# Patient Record
Sex: FEMALE
[De-identification: names, ages, dates, MRNs, and addresses within clinical notes are randomized per-mention and may not be internally consistent; named-entity substitution may affect disease eponyms.]

---

## 2019-04-12 ENCOUNTER — TRANSCRIPTION ENCOUNTER (OUTPATIENT)
Age: 74
End: 2019-04-12

## 2019-08-02 ENCOUNTER — TRANSCRIPTION ENCOUNTER (OUTPATIENT)
Age: 74
End: 2019-08-02

## 2019-11-22 ENCOUNTER — TRANSCRIPTION ENCOUNTER (OUTPATIENT)
Age: 74
End: 2019-11-22

## 2021-04-15 ENCOUNTER — NON-APPOINTMENT (OUTPATIENT)
Age: 76
End: 2021-04-15

## 2021-04-15 ENCOUNTER — APPOINTMENT (OUTPATIENT)
Dept: OPHTHALMOLOGY | Facility: CLINIC | Age: 76
End: 2021-04-15
Payer: MEDICARE

## 2021-04-15 PROBLEM — Z00.00 ENCOUNTER FOR PREVENTIVE HEALTH EXAMINATION: Status: ACTIVE | Noted: 2021-04-15

## 2021-04-15 PROCEDURE — 99072 ADDL SUPL MATRL&STAF TM PHE: CPT

## 2021-04-15 PROCEDURE — 92020 GONIOSCOPY: CPT

## 2021-04-15 PROCEDURE — 92250 FUNDUS PHOTOGRAPHY W/I&R: CPT

## 2021-04-15 PROCEDURE — 92004 COMPRE OPH EXAM NEW PT 1/>: CPT

## 2021-11-05 ENCOUNTER — APPOINTMENT (OUTPATIENT)
Dept: OPHTHALMOLOGY | Facility: CLINIC | Age: 76
End: 2021-11-05

## 2023-01-20 ENCOUNTER — OFFICE VISIT (OUTPATIENT)
Dept: FAMILY MEDICINE CLINIC | Facility: CLINIC | Age: 78
End: 2023-01-20

## 2023-01-20 VITALS
TEMPERATURE: 97.5 F | OXYGEN SATURATION: 98 % | WEIGHT: 144 LBS | SYSTOLIC BLOOD PRESSURE: 150 MMHG | BODY MASS INDEX: 24.59 KG/M2 | RESPIRATION RATE: 16 BRPM | HEART RATE: 54 BPM | DIASTOLIC BLOOD PRESSURE: 60 MMHG | HEIGHT: 64 IN

## 2023-01-20 DIAGNOSIS — E03.9 ACQUIRED HYPOTHYROIDISM: ICD-10-CM

## 2023-01-20 DIAGNOSIS — E11.9 TYPE 2 DIABETES MELLITUS WITHOUT COMPLICATION, WITHOUT LONG-TERM CURRENT USE OF INSULIN (HCC): Primary | ICD-10-CM

## 2023-01-20 DIAGNOSIS — I10 ESSENTIAL HYPERTENSION: ICD-10-CM

## 2023-01-20 DIAGNOSIS — F41.1 GENERALIZED ANXIETY DISORDER: ICD-10-CM

## 2023-01-20 DIAGNOSIS — I89.0 LYMPHEDEMA OF LEFT ARM: ICD-10-CM

## 2023-01-20 DIAGNOSIS — Z23 NEED FOR VACCINATION: ICD-10-CM

## 2023-01-20 DIAGNOSIS — Z11.59 NEED FOR HEPATITIS C SCREENING TEST: ICD-10-CM

## 2023-01-20 RX ORDER — PAROXETINE 10 MG/1
10 TABLET, FILM COATED ORAL DAILY
Qty: 90 TABLET | Refills: 1 | Status: SHIPPED | OUTPATIENT
Start: 2023-01-20

## 2023-01-20 RX ORDER — BENAZEPRIL HYDROCHLORIDE 40 MG/1
40 TABLET, FILM COATED ORAL DAILY
COMMUNITY
End: 2023-01-20 | Stop reason: SDUPTHER

## 2023-01-20 RX ORDER — PAROXETINE 10 MG/1
10 TABLET, FILM COATED ORAL DAILY
COMMUNITY
End: 2023-01-20 | Stop reason: SDUPTHER

## 2023-01-20 RX ORDER — AMLODIPINE BESYLATE 10 MG/1
10 TABLET ORAL DAILY
COMMUNITY
End: 2023-01-20 | Stop reason: SDUPTHER

## 2023-01-20 RX ORDER — LEVOTHYROXINE SODIUM 0.05 MG/1
50 TABLET ORAL DAILY
COMMUNITY
End: 2023-01-20 | Stop reason: SDUPTHER

## 2023-01-20 RX ORDER — AMLODIPINE BESYLATE 10 MG/1
10 TABLET ORAL DAILY
Qty: 90 TABLET | Refills: 1 | Status: SHIPPED | OUTPATIENT
Start: 2023-01-20

## 2023-01-20 RX ORDER — LEVOTHYROXINE SODIUM 0.05 MG/1
50 TABLET ORAL DAILY
Qty: 90 TABLET | Refills: 1 | Status: SHIPPED | OUTPATIENT
Start: 2023-01-20

## 2023-01-20 RX ORDER — BENAZEPRIL HYDROCHLORIDE 40 MG/1
40 TABLET, FILM COATED ORAL DAILY
Qty: 90 TABLET | Refills: 1 | Status: SHIPPED | OUTPATIENT
Start: 2023-01-20

## 2023-01-20 NOTE — ASSESSMENT & PLAN NOTE
Patient ports that her sugars have been controlled  We will have her get labs and follow-up in 1 month  Continue metformin

## 2023-01-20 NOTE — PROGRESS NOTES
Name: Yovany Castro      : 1945      MRN: [id number]  Encounter Provider: Rony Bentley DO  Encounter Date: 2023   [facility]    Assessment & Plan     1  Type 2 diabetes mellitus without complication, without long-term current use of insulin (HCC)  Assessment & Plan:    Patient ports that her sugars have been controlled  We will have her get labs and follow-up in 1 month  Continue metformin    Orders:  -     metFORMIN (GLUCOPHAGE) 1000 MG tablet; Take 1 tablet (1,000 mg total) by mouth 2 (two) times a day with meals  -     Hemoglobin A1C; Future; Expected date: 2023  -     Microalbumin / creatinine urine ratio; Future; Expected date: 2023    2  Lymphedema of left arm  Assessment & Plan:  Has history of skin infections   Has compression sleeve at home      Orders:  -     Ambulatory referral to Physical Therapy; Future    3  Essential hypertension  Assessment & Plan:  Blood pressure is elevated today but patient is anxious about her first appointment here  We will have her continue her amlodipine 10 mg daily and benazepril 40 mg daily and follow-up in the office for 1 month recheck    Orders:  -     benazepril (LOTENSIN) 40 MG tablet; Take 1 tablet (40 mg total) by mouth daily  -     amLODIPine (NORVASC) 10 mg tablet; Take 1 tablet (10 mg total) by mouth daily  -     Comprehensive metabolic panel; Future; Expected date: 2023  -     CBC and Platelet; Future; Expected date: 2023  -     Lipid Panel with Direct LDL reflex; Future; Expected date: 2023    4  Acquired hypothyroidism  Assessment & Plan:  Stable  Will continue levothyroxine 50 mcg daily    Orders:  -     levothyroxine 50 mcg tablet; Take 1 tablet (50 mcg total) by mouth daily  -     TSH, 3rd generation; Future  -     T4, free; Future    5   Generalized anxiety disorder  Assessment & Plan:  Stable  Continue paroxetine 10 mg daily    Orders:  -     PARoxetine (PAXIL) 10 mg tablet; Take 1 tablet (10 mg total) by mouth daily    6  Need for hepatitis C screening test  -     Hepatitis C Antibody ([facility]); Future    7  Need for vaccination  -     influenza vaccine, high-dose, PF 0 7 mL (FLUZONE HIGH-DOSE)        Depression Screening and Follow-up Plan: Patient was screened for depression during today's encounter  They screened negative with a PHQ-2 score of 0  Return in about 1 month (around 2/20/2023) for Diabetic follow up  Subjective      She had diabetes for 10 years  She is taking her medication regularly  She has had under active thyroid for years  She started paxil recently and is tolerating it well  It is helping her anxiety  She had had lymphedema in her left arm since her mastectomy  She does get periodic infections  Review of Systems    Current Outpatient Medications on File Prior to Visit   Medication Sig   • [DISCONTINUED] amLODIPine (NORVASC) 10 mg tablet Take 10 mg by mouth daily   • [DISCONTINUED] benazepril (LOTENSIN) 40 MG tablet Take 40 mg by mouth daily   • [DISCONTINUED] levothyroxine 50 mcg tablet Take 50 mcg by mouth daily   • [DISCONTINUED] metFORMIN (GLUCOPHAGE) 1000 MG tablet Take 1,000 mg by mouth 2 (two) times a day with meals   • [DISCONTINUED] PARoxetine (PAXIL) 10 mg tablet Take 10 mg by mouth daily       Objective     /60 (BP Location: Right arm, Patient Position: Sitting, Cuff Size: Standard)   Pulse (!) 54   Temp 97 5 °F (36 4 °C) (Temporal)   Resp 16   Ht 5' 4" (1 626 m)   Wt 65 3 kg (144 lb)   SpO2 98%   BMI 24 72 kg/m²     Physical Exam  Vitals and nursing note reviewed  Constitutional:       Appearance: She is well-developed  HENT:      Head: Normocephalic and atraumatic  Right Ear: Tympanic membrane and external ear normal       Left Ear: Tympanic membrane and external ear normal    Cardiovascular:      Rate and Rhythm: Normal rate and regular rhythm  Heart sounds: Normal heart sounds   No murmur heard     No friction rub  Pulmonary:      Effort: Pulmonary effort is normal  No respiratory distress  Breath sounds: Normal breath sounds  No wheezing or rales  Musculoskeletal:      Right lower leg: No edema  Left lower leg: No edema         Montgomery Southward, DO

## 2023-01-20 NOTE — ASSESSMENT & PLAN NOTE
Blood pressure is elevated today but patient is anxious about her first appointment here  We will have her continue her amlodipine 10 mg daily and benazepril 40 mg daily and follow-up in the office for 1 month recheck

## 2023-01-26 ENCOUNTER — OFFICE VISIT (OUTPATIENT)
Dept: URGENT CARE | Facility: CLINIC | Age: 78
End: 2023-01-26

## 2023-01-26 VITALS
SYSTOLIC BLOOD PRESSURE: 135 MMHG | WEIGHT: 145 LBS | HEIGHT: 64 IN | DIASTOLIC BLOOD PRESSURE: 71 MMHG | HEART RATE: 45 BPM | TEMPERATURE: 98.2 F | RESPIRATION RATE: 18 BRPM | OXYGEN SATURATION: 100 % | BODY MASS INDEX: 24.75 KG/M2

## 2023-01-26 DIAGNOSIS — R22.0 FACIAL SWELLING: Primary | ICD-10-CM

## 2023-01-26 NOTE — PROGRESS NOTES
[address] [facility]        NAME: Kandis Robledo is a 69 y o  female  : 1945    MRN: [id number]  DATE: 2023  TIME: 12:46 PM    Assessment and Plan   Facial swelling [R22 0]  1  Facial swelling          Unclear etiology but ddx may include allergic reaction, dental infection, sinusitis >>> acute neurologic event  Patient's neuro exam is completely normal, and she states these exact same symptoms have previously self-resolved  Encouraged pt to see pcp in next few days if no improvement  Discussed strict return to care precautions as well as red flag symptoms which should prompt immediate ED referral  Pt verbalized understanding and is in agreement with plan  Please follow up with your primary care provider within the next week  Please remember that your visit today was with an urgent care provider and should not replace follow up with your primary care provider for chronic medical issues or annual physicals  Patient Instructions       Follow up with PCP in 3-5 days  Proceed to  ER if symptoms worsen  Chief Complaint     Chief Complaint   Patient presents with   • Facial Swelling     Rt facial swelling began this am          History of Present Illness       Pt is a 68 yo female with pmh DM2, HTN pw R sided facial swelling x 12 hours  Turned over in her sleep around 1230 am and noticed the right side of her face felt heavy, so she got up to use the bathroom and noticed the swelling  Applied ice and it went down  Notes she has a bad tooth on that side that needs to be pulled but it is not causing her any pain  Had similar problem 2 months ago on the left side of her face which resolved spontaneously  Is s/p b/l mastectomy in  for ca  States she always has a low heart rate in the mid 40s and last EKG was 2022  Has follow up appointment with pcp on   Review of Systems   Review of Systems   Constitutional: Negative for chills, diaphoresis, fatigue and fever     HENT: Positive for facial swelling  Negative for congestion, ear pain, postnasal drip, rhinorrhea, sinus pain, sneezing, sore throat and trouble swallowing  Eyes: Negative for pain and redness  Respiratory: Negative for cough, chest tightness, shortness of breath and wheezing  Cardiovascular: Negative for chest pain and leg swelling  Gastrointestinal: Negative for diarrhea, nausea and vomiting  Musculoskeletal: Negative for myalgias  Skin: Negative for rash  Neurological: Negative for dizziness, speech difficulty, weakness and headaches  Current Medications       Current Outpatient Medications:   •  amLODIPine (NORVASC) 10 mg tablet, Take 1 tablet (10 mg total) by mouth daily, Disp: 90 tablet, Rfl: 1  •  benazepril (LOTENSIN) 40 MG tablet, Take 1 tablet (40 mg total) by mouth daily, Disp: 90 tablet, Rfl: 1  •  levothyroxine 50 mcg tablet, Take 1 tablet (50 mcg total) by mouth daily, Disp: 90 tablet, Rfl: 1  •  metFORMIN (GLUCOPHAGE) 1000 MG tablet, Take 1 tablet (1,000 mg total) by mouth 2 (two) times a day with meals, Disp: 90 tablet, Rfl: 1  •  PARoxetine (PAXIL) 10 mg tablet, Take 1 tablet (10 mg total) by mouth daily, Disp: 90 tablet, Rfl: 1    Current Allergies     Allergies as of 01/26/2023 - Reviewed 01/26/2023   Allergen Reaction Noted   • Other Vomiting and Fever 01/20/2023            The following portions of the patient's history were reviewed and updated as appropriate: allergies, current medications, past family history, past medical history, past social history, past surgical history and problem list      Past Medical History:   Diagnosis Date   • Breast cancer ([facility] [address] )        Past Surgical History:   Procedure Laterality Date   • HYSTERECTOMY     • MASTECTOMY MODIFIED RADICAL Bilateral    • SPINE SURGERY         Family History   Problem Relation Age of Onset   • Hypertension Father    • Hypertension Sister    • Hypertension Brother          Medications have been verified          Objective   BP 135/71   Pulse (!) 45   Temp 98 2 °F (36 8 °C)   Resp 18   Ht 5' 4" (1 626 m)   Wt 65 8 kg (145 lb)   SpO2 100%   BMI 24 89 kg/m²        Physical Exam     Physical Exam  Vitals and nursing note reviewed  Constitutional:       General: She is not in acute distress  Appearance: Normal appearance  She is not ill-appearing  HENT:      Head: Normocephalic and atraumatic  Mouth/Throat:      Mouth: Mucous membranes are moist       Dentition: Dental caries present  No dental tenderness or gum lesions  Tongue: Tongue does not deviate from midline  Pharynx: Oropharynx is clear  Uvula midline  Cardiovascular:      Rate and Rhythm: Regular rhythm  Bradycardia present  Heart sounds: Normal heart sounds  Pulmonary:      Effort: Pulmonary effort is normal  No respiratory distress  Breath sounds: Normal breath sounds  Skin:     General: Skin is warm and dry  Capillary Refill: Capillary refill takes less than 2 seconds  Neurological:      General: No focal deficit present  Mental Status: She is alert and oriented to person, place, and time  Cranial Nerves: No cranial nerve deficit  Sensory: No sensory deficit  Motor: No weakness        Coordination: Coordination normal       Gait: Gait normal    Psychiatric:         Behavior: Behavior normal

## 2023-01-27 ENCOUNTER — APPOINTMENT (OUTPATIENT)
Dept: LAB | Facility: CLINIC | Age: 78
End: 2023-01-27

## 2023-01-27 DIAGNOSIS — E03.9 ACQUIRED HYPOTHYROIDISM: ICD-10-CM

## 2023-01-27 DIAGNOSIS — Z11.59 NEED FOR HEPATITIS C SCREENING TEST: ICD-10-CM

## 2023-01-27 DIAGNOSIS — E11.9 TYPE 2 DIABETES MELLITUS WITHOUT COMPLICATION, WITHOUT LONG-TERM CURRENT USE OF INSULIN (HCC): ICD-10-CM

## 2023-01-27 DIAGNOSIS — I10 ESSENTIAL HYPERTENSION: ICD-10-CM

## 2023-01-27 LAB
ALBUMIN SERPL BCP-MCNC: 3.5 G/DL (ref 3.5–5)
ALP SERPL-CCNC: 85 U/L (ref 46–116)
ALT SERPL W P-5'-P-CCNC: 30 U/L (ref 12–78)
ANION GAP SERPL CALCULATED.3IONS-SCNC: 4 MMOL/L (ref 4–13)
AST SERPL W P-5'-P-CCNC: 21 U/L (ref 5–45)
BILIRUB SERPL-MCNC: 0.63 MG/DL (ref 0.2–1)
BUN SERPL-MCNC: 18 MG/DL (ref 5–25)
CALCIUM SERPL-MCNC: 10.2 MG/DL (ref 8.3–10.1)
CHLORIDE SERPL-SCNC: 107 MMOL/L (ref 96–108)
CHOLEST SERPL-MCNC: 167 MG/DL
CO2 SERPL-SCNC: 26 MMOL/L (ref 21–32)
CREAT SERPL-MCNC: 0.81 MG/DL (ref 0.6–1.3)
CREAT UR-MCNC: 99.7 MG/DL
ERYTHROCYTE [DISTWIDTH] IN BLOOD BY AUTOMATED COUNT: 14.2 % (ref 11.6–15.1)
GFR SERPL CREATININE-BSD FRML MDRD: 70 ML/MIN/1.73SQ M
GLUCOSE P FAST SERPL-MCNC: 105 MG/DL (ref 65–99)
HCT VFR BLD AUTO: 43.2 % (ref 34.8–46.1)
HCV AB SER QL: NORMAL
HDLC SERPL-MCNC: 84 MG/DL
HGB BLD-MCNC: 13.7 G/DL (ref 11.5–15.4)
LDLC SERPL CALC-MCNC: 74 MG/DL (ref 0–100)
MCH RBC QN AUTO: 26.6 PG (ref 26.8–34.3)
MCHC RBC AUTO-ENTMCNC: 31.7 G/DL (ref 31.4–37.4)
MCV RBC AUTO: 84 FL (ref 82–98)
MICROALBUMIN UR-MCNC: 19.9 MG/L (ref 0–20)
MICROALBUMIN/CREAT 24H UR: 20 MG/G CREATININE (ref 0–30)
PLATELET # BLD AUTO: 242 THOUSANDS/UL (ref 149–390)
PMV BLD AUTO: 9.7 FL (ref 8.9–12.7)
POTASSIUM SERPL-SCNC: 3.7 MMOL/L (ref 3.5–5.3)
PROT SERPL-MCNC: 7.3 G/DL (ref 6.4–8.4)
RBC # BLD AUTO: 5.16 MILLION/UL (ref 3.81–5.12)
SODIUM SERPL-SCNC: 137 MMOL/L (ref 135–147)
T4 FREE SERPL-MCNC: 1.22 NG/DL (ref 0.76–1.46)
TRIGL SERPL-MCNC: 45 MG/DL
TSH SERPL DL<=0.05 MIU/L-ACNC: 0.22 UIU/ML (ref 0.45–4.5)
WBC # BLD AUTO: 6.69 THOUSAND/UL (ref 4.31–10.16)

## 2023-01-28 LAB
EST. AVERAGE GLUCOSE BLD GHB EST-MCNC: 131 MG/DL
HBA1C MFR BLD: 6.2 %

## 2023-02-11 ENCOUNTER — APPOINTMENT (EMERGENCY)
Dept: RADIOLOGY | Facility: HOSPITAL | Age: 78
End: 2023-02-11

## 2023-02-11 ENCOUNTER — HOSPITAL ENCOUNTER (EMERGENCY)
Facility: HOSPITAL | Age: 78
Discharge: HOME | End: 2023-02-11
Attending: EMERGENCY MEDICINE

## 2023-02-11 VITALS
RESPIRATION RATE: 18 BRPM | TEMPERATURE: 98.1 F | HEART RATE: 51 BPM | SYSTOLIC BLOOD PRESSURE: 176 MMHG | OXYGEN SATURATION: 100 % | DIASTOLIC BLOOD PRESSURE: 77 MMHG

## 2023-02-11 DIAGNOSIS — S20.212A RIB CONTUSION, LEFT, INITIAL ENCOUNTER: ICD-10-CM

## 2023-02-11 DIAGNOSIS — S06.0XAA CONCUSSION: Primary | ICD-10-CM

## 2023-02-11 RX ORDER — LIDOCAINE 50 MG/G
1 PATCH TOPICAL ONCE
Status: DISCONTINUED | OUTPATIENT
Start: 2023-02-11 | End: 2023-02-11 | Stop reason: HOSPADM

## 2023-02-11 RX ORDER — ACETAMINOPHEN 325 MG/1
975 TABLET ORAL ONCE
Status: COMPLETED | OUTPATIENT
Start: 2023-02-11 | End: 2023-02-11

## 2023-02-11 RX ORDER — ONDANSETRON 4 MG/1
4 TABLET, ORALLY DISINTEGRATING ORAL ONCE
Status: COMPLETED | OUTPATIENT
Start: 2023-02-11 | End: 2023-02-11

## 2023-02-11 RX ADMIN — LIDOCAINE 5% 1 PATCH: 700 PATCH TOPICAL at 16:24

## 2023-02-11 RX ADMIN — ACETAMINOPHEN 975 MG: 325 TABLET, FILM COATED ORAL at 14:22

## 2023-02-11 RX ADMIN — ONDANSETRON 4 MG: 4 TABLET, ORALLY DISINTEGRATING ORAL at 14:22

## 2023-02-12 NOTE — ED PROVIDER NOTES
History  Chief Complaint   Patient presents with   • Fall     Pt reports falling down the bottom two steps of a flight of stairs, striking her head off the railing, and catching the inside of L arm on railing  Pt reports armpit pain in L arm, and increasing nausea after head injury  Pt History of L arm lymphedema     HPI    Prior to Admission Medications   Prescriptions Last Dose Informant Patient Reported? Taking? PARoxetine (PAXIL) 10 mg tablet   No No   Sig: Take 1 tablet (10 mg total) by mouth daily   amLODIPine (NORVASC) 10 mg tablet   No No   Sig: Take 1 tablet (10 mg total) by mouth daily   benazepril (LOTENSIN) 40 MG tablet   No No   Sig: Take 1 tablet (40 mg total) by mouth daily   levothyroxine 50 mcg tablet   No No   Sig: Take 1 tablet (50 mcg total) by mouth daily   metFORMIN (GLUCOPHAGE) 1000 MG tablet   No No   Sig: Take 1 tablet (1,000 mg total) by mouth 2 (two) times a day with meals      Facility-Administered Medications: None       Past Medical History:   Diagnosis Date   • [facility]        Past Surgical History:   Procedure Laterality Date   • HYSTERECTOMY     • MASTECTOMY MODIFIED RADICAL Bilateral    • SPINE SURGERY         Family History   Problem Relation Age of Onset   • Hypertension Father    • Hypertension Sister    • Hypertension Brother      I have reviewed and agree with the history as documented  E-Cigarette/Vaping     E-Cigarette/Vaping Substances     Social History     Tobacco Use   • Smoking status: Never     Passive exposure: Never   • Smokeless tobacco: Never   Substance Use Topics   • Alcohol use:  Yes     Alcohol/week: 1 0 standard drink     Types: 1 Glasses of wine per week   • Drug use: Never       Review of Systems    Physical Exam  Physical Exam    Vital Signs  ED Triage Vitals [02/11/23 1332]   Temperature Pulse Respirations Blood Pressure SpO2   98 1 °F (36 7 °C) (!) 51 18 (!) 176/77 100 %      Temp Source Heart Rate Source Patient Position - Orthostatic VS BP Location FiO2 (%)   Tympanic Monitor Sitting Right arm --      Pain Score       9           Vitals:    02/11/23 1332   BP: (!) 176/77   Pulse: (!) 51   Patient Position - Orthostatic VS: Sitting         Visual Acuity      ED Medications  Medications   acetaminophen (TYLENOL) tablet 975 mg (975 mg Oral Given 2/11/23 1422)   ondansetron (ZOFRAN-ODT) dispersible tablet 4 mg (4 mg Oral Given 2/11/23 1422)       Diagnostic Studies  Results Reviewed     None                 CT head without contrast   Final Result by Bee Moyer MD (02/11 1537)      No acute intracranial abnormality  Chronic microangiopathic changes  Workstation performed: [id number]         XR ribs with pa chest min 3 views LEFT   Final Result by Courtney Long MD (02/11 1753)      1  No evidence of rib fracture  2   Large superior mediastinal mass  Differential includes lymphoma, metastasis, thyroid goiter, and thymoma  Chest CT recommended for further evaluation  Left mid lung nodule  This can also be evaluated on chest CT  I personally discussed this study with Moe Hewitt on 2/11/2023 at 5:47 PM                Workstation performed: [id number]                    Procedures  Procedures         ED Course                               SBIRT 20yo+    Flowsheet Row Most Recent Value   SBIRT (25 yo +)    In order to provide better care to our patients, we are screening all of our patients for alcohol and drug use  Would it be okay to ask you these screening questions? Yes Filed at: 02/11/2023 1556   Initial Alcohol Screen: US AUDIT-C     1  How often do you have a drink containing alcohol? 0 Filed at: 02/11/2023 1556   2  How many drinks containing alcohol do you have on a typical day you are drinking? 0 Filed at: 02/11/2023 1556   3b  FEMALE Any Age, or MALE 65+: How often do you have 4 or more drinks on one occassion?  0 Filed at: 02/11/2023 1556   Audit-C Score 0 Filed at: 02/11/2023 1556   BECKA: How many times in the past year have you    Used an illegal drug or used a prescription medication for non-medical reasons? Never Filed at: 02/11/2023 1556                    MDM    Disposition  Final diagnoses:   Concussion   Rib contusion, left, initial encounter     Time reflects when diagnosis was documented in both MDM as applicable and the Disposition within this note     Time User Action Codes Description Comment    2/11/2023  4:16 PM Jazmin Bailey [S06  0XAA] Concussion     2/11/2023  4:17 PM Jazmin Bailey [S20 212A] Rib contusion, left, initial encounter       ED Disposition     ED Disposition   Discharge    Condition   Stable    Date/Time   Sat Feb 11, 2023 [address] [facility] discharge to home/self care  Follow-up Information     Follow up With Specialties Details Why [address], DO Family Medicine  As needed, If symptoms worsen [address] [address]  [phone]            Discharge Medication List as of 2/11/2023  4:20 PM      CONTINUE these medications which have NOT CHANGED    Details   amLODIPine (NORVASC) 10 mg tablet Take 1 tablet (10 mg total) by mouth daily, Starting Fri 1/20/2023, Normal      benazepril (LOTENSIN) 40 MG tablet Take 1 tablet (40 mg total) by mouth daily, Starting Fri 1/20/2023, Normal      levothyroxine 50 mcg tablet Take 1 tablet (50 mcg total) by mouth daily, Starting Fri 1/20/2023, Normal      metFORMIN (GLUCOPHAGE) 1000 MG tablet Take 1 tablet (1,000 mg total) by mouth 2 (two) times a day with meals, Starting Fri 1/20/2023, Normal      PARoxetine (PAXIL) 10 mg tablet Take 1 tablet (10 mg total) by mouth daily, Starting Fri 1/20/2023, Normal             No discharge procedures on file      PDMP Review     None          ED Provider  Electronically Signed by Time reflects when diagnosis was documented in both MDM as applicable and the Disposition within this note     Time User Action Codes Description Comment    2/11/2023  4:16 PM Leta Resides Add [S06  0XAA] Concussion     2/11/2023  4:17 PM Leta Resides Add [S20 212A] Rib contusion, left, initial encounter       ED Disposition     ED Disposition   Discharge    Condition   Stable    Date/Time   Sat Feb 11, 2023 [address] [facility] discharge to home/self care  Follow-up Information     Follow up With Specialties Details Why [address], DO Family Medicine  As needed, If symptoms worsen [address] [address]  [phone]            Discharge Medication List as of 2/11/2023  4:20 PM      CONTINUE these medications which have NOT CHANGED    Details   amLODIPine (NORVASC) 10 mg tablet Take 1 tablet (10 mg total) by mouth daily, Starting Fri 1/20/2023, Normal      benazepril (LOTENSIN) 40 MG tablet Take 1 tablet (40 mg total) by mouth daily, Starting Fri 1/20/2023, Normal      levothyroxine 50 mcg tablet Take 1 tablet (50 mcg total) by mouth daily, Starting Fri 1/20/2023, Normal      metFORMIN (GLUCOPHAGE) 1000 MG tablet Take 1 tablet (1,000 mg total) by mouth 2 (two) times a day with meals, Starting Fri 1/20/2023, Normal      PARoxetine (PAXIL) 10 mg tablet Take 1 tablet (10 mg total) by mouth daily, Starting Fri 1/20/2023, Normal             No discharge procedures on file      PDMP Review     None          ED Provider  Electronically Signed by           Ivania Hernández DO  02/27/23 6999

## 2023-02-20 ENCOUNTER — OFFICE VISIT (OUTPATIENT)
Dept: FAMILY MEDICINE CLINIC | Facility: CLINIC | Age: 78
End: 2023-02-20

## 2023-02-20 VITALS
OXYGEN SATURATION: 98 % | DIASTOLIC BLOOD PRESSURE: 60 MMHG | HEIGHT: 64 IN | SYSTOLIC BLOOD PRESSURE: 124 MMHG | TEMPERATURE: 98.4 F | WEIGHT: 143.4 LBS | RESPIRATION RATE: 18 BRPM | BODY MASS INDEX: 24.48 KG/M2 | HEART RATE: 58 BPM

## 2023-02-20 DIAGNOSIS — E03.9 ACQUIRED HYPOTHYROIDISM: ICD-10-CM

## 2023-02-20 DIAGNOSIS — E11.9 TYPE 2 DIABETES MELLITUS WITHOUT COMPLICATION, WITHOUT LONG-TERM CURRENT USE OF INSULIN (HCC): ICD-10-CM

## 2023-02-20 DIAGNOSIS — E04.9 ENLARGED THYROID GLAND: ICD-10-CM

## 2023-02-20 DIAGNOSIS — F33.9 RECURRENT DEPRESSION (HCC): ICD-10-CM

## 2023-02-20 DIAGNOSIS — I10 ESSENTIAL HYPERTENSION: ICD-10-CM

## 2023-02-20 DIAGNOSIS — J98.59 MEDIASTINAL MASS: Primary | ICD-10-CM

## 2023-02-20 DIAGNOSIS — E83.52 HYPERCALCEMIA: ICD-10-CM

## 2023-02-20 PROBLEM — Z85.3 HISTORY OF BREAST CANCER: Status: ACTIVE | Noted: 2023-02-20

## 2023-02-20 RX ORDER — LEVOTHYROXINE SODIUM 0.03 MG/1
25 TABLET ORAL DAILY
Qty: 90 TABLET | Refills: 1 | Status: SHIPPED | OUTPATIENT
Start: 2023-02-20

## 2023-02-20 NOTE — PROGRESS NOTES
Name: Holly Mojica      : 1945      MRN: [id number]  Encounter Provider: Veronica Reeves DO  Encounter Date: 2023   Encounter department: [facility]    Assessment & Plan     1  Mediastinal mass  Comments:  New mediastinal mass, patient with history of breast cancer  I am concerned about recurrent breast cancer  Patient and her daughter are agreeable to get CT of the chest for further work-up  Once I get the results I will call her to discuss next steps  May need referral to surgery or oncology depending on the results  Orders:  -     CT chest w contrast; Future; Expected date: 2023  -     CBC; Future; Expected date: 2023    2  Hypercalcemia  Comments:  New, will stop calcium vitamin and recheck labs  Concerned about enlarged area in her neck as well  Could be her parathyroid? We will check ultrasound  Orders:  -     Basic metabolic panel; Future  -     Vitamin D 25 hydroxy; Future  -     PTH, intact; Future  -     Phosphorus; Future    3  Acquired hypothyroidism  Assessment & Plan:  TSH is too low  Dose of levothyroxine changed from 50 mcg daily to 25 mcg daily  Patient also noted to have enlarged thyroid gland on exam    Orders:  -     US thyroid; Future; Expected date: 2023  -     levothyroxine 25 mcg tablet; Take 1 tablet (25 mcg total) by mouth daily  -     T4, free; Future; Expected date: 2023  -     TSH, 3rd generation; Future; Expected date: 2023    4  Type 2 diabetes mellitus without complication, without long-term current use of insulin (HCC)  Assessment & Plan:    Lab Results   Component Value Date    HGBA1C 6 2 (H) 2023     Diabetes is well controlled  Continue metformin 1000 mg twice daily    Orders:  -     Comprehensive metabolic panel; Future; Expected date: 2023  -     Hemoglobin A1C; Future; Expected date: 2023  -     Lipid Panel with Direct LDL reflex; Future; Expected date: 2023    5   Enlarged thyroid gland  Comments:  New, ultrasound ordered  Orders:  -     US thyroid; Future; Expected date: 02/20/2023    6  Recurrent depression ([address] )  Assessment & Plan:  Worsening  Patient to continue paroxetine and referred to behavioral health for therapy    Orders:  -     Ambulatory Referral to [facility]; Future    7  Essential hypertension  Assessment & Plan:  Blood pressure is well controlled  Continue benazepril 40 mg daily    Return in about 6 months (around 8/20/2023) for Diabetic follow up  Subjective      Patient here today for follow-up of her diabetes  She is been doing well on her medication  She also notes that she was recently in the emergency room after falling down her stairs at home  On the emergency room she did have an abnormal x-ray  Review of Systems    Current Outpatient Medications on File Prior to Visit   Medication Sig   • amLODIPine (NORVASC) 10 mg tablet Take 1 tablet (10 mg total) by mouth daily   • benazepril (LOTENSIN) 40 MG tablet Take 1 tablet (40 mg total) by mouth daily   • metFORMIN (GLUCOPHAGE) 1000 MG tablet Take 1 tablet (1,000 mg total) by mouth 2 (two) times a day with meals   • PARoxetine (PAXIL) 10 mg tablet Take 1 tablet (10 mg total) by mouth daily   • [DISCONTINUED] levothyroxine 50 mcg tablet Take 1 tablet (50 mcg total) by mouth daily       Objective     /60   Pulse 58   Temp 98 4 °F (36 9 °C)   Resp 18   Ht 5' 4" (1 626 m)   Wt 65 kg (143 lb 6 4 oz)   SpO2 98%   BMI 24 61 kg/m²     Physical Exam  Vitals and nursing note reviewed  Constitutional:       Appearance: She is well-developed  HENT:      Head: Normocephalic and atraumatic  Right Ear: Tympanic membrane and external ear normal       Left Ear: Tympanic membrane and external ear normal    Cardiovascular:      Rate and Rhythm: Normal rate and regular rhythm  Heart sounds: Normal heart sounds  No murmur heard  No friction rub     Pulmonary:      Effort: Pulmonary effort is normal  No respiratory distress  Breath sounds: Normal breath sounds  No wheezing or rales  Musculoskeletal:      Right lower leg: No edema  Left lower leg: No edema        Comments: Left upper extremity edema       Alice Ansari DO

## 2023-02-20 NOTE — ASSESSMENT & PLAN NOTE
TSH is too low  Dose of levothyroxine changed from 50 mcg daily to 25 mcg daily  Patient also noted to have enlarged thyroid gland on exam

## 2023-02-20 NOTE — ASSESSMENT & PLAN NOTE
Lab Results   Component Value Date    HGBA1C 6 2 (H) 01/27/2023     Diabetes is well controlled  Continue metformin 1000 mg twice daily

## 2023-02-20 NOTE — PATIENT INSTRUCTIONS
Recent Results (from the past 672 hour(s))   Hemoglobin A1C    Collection Time: 01/27/23 10:25 AM   Result Value Ref Range    Hemoglobin A1C 6 2 (H) Normal 3 8-5 6%; PreDiabetic 5 7-6 4%; Diabetic >=6 5%; Glycemic control for adults with diabetes <7 0% %     mg/dl   Comprehensive metabolic panel    Collection Time: 01/27/23 10:25 AM   Result Value Ref Range    Sodium 137 135 - 147 mmol/L    Potassium 3 7 3 5 - 5 3 mmol/L    Chloride 107 96 - 108 mmol/L    CO2 26 21 - 32 mmol/L    ANION GAP 4 4 - 13 mmol/L    BUN 18 5 - 25 mg/dL    Creatinine 0 81 0 60 - 1 30 mg/dL    Glucose, Fasting 105 (H) 65 - 99 mg/dL    Calcium 10 2 (H) 8 3 - 10 1 mg/dL    AST 21 5 - 45 U/L    ALT 30 12 - 78 U/L    Alkaline Phosphatase 85 46 - 116 U/L    Total Protein 7 3 6 4 - 8 4 g/dL    Albumin 3 5 3 5 - 5 0 g/dL    Total Bilirubin 0 63 0 20 - 1 00 mg/dL    eGFR 70 ml/min/1 73sq m   CBC and Platelet    Collection Time: 01/27/23 10:25 AM   Result Value Ref Range    WBC 6 69 4 31 - 10 16 Thousand/uL    RBC 5 16 (H) 3 81 - 5 12 Million/uL    Hemoglobin 13 7 11 5 - 15 4 g/dL    Hematocrit 43 2 34 8 - 46 1 %    MCV 84 82 - 98 fL    MCH 26 6 (L) 26 8 - 34 3 pg    MCHC 31 7 31 4 - 37 4 g/dL    RDW 14 2 11 6 - 15 1 %    Platelets 138 474 - 140 Thousands/uL    MPV 9 7 8 9 - 12 7 fL   Microalbumin / creatinine urine ratio    Collection Time: 01/27/23 10:25 AM   Result Value Ref Range    Creatinine, Ur 99 7 mg/dL    Microalbum  ,U,Random 19 9 0 0 - 20 0 mg/L    Microalb Creat Ratio 20 0 - 30 mg/g creatinine   Lipid Panel with Direct LDL reflex    Collection Time: 01/27/23 10:25 AM   Result Value Ref Range    Cholesterol 167 See Comment mg/dL    Triglycerides 45 See Comment mg/dL    HDL, Direct 84 >=50 mg/dL    LDL Calculated 74 0 - 100 mg/dL   TSH, 3rd generation    Collection Time: 01/27/23 10:25 AM   Result Value Ref Range    TSH 3RD GENERATON 0 220 (L) 0 450 - 4 500 uIU/mL   T4, free    Collection Time: 01/27/23 10:25 AM   Result Value Ref Range    Free T4 1 22 0 76 - 1 46 ng/dL   Hepatitis C Antibody ([facility])    Collection Time: 01/27/23 10:25 AM   Result Value Ref Range    Hepatitis C Ab Non-reactive Non-reactive    Study Result    Narrative & Impression   LEFT RIBS AND CHEST -DUAL ENERGY     INDICATION:   trauma  COMPARISON:  None     EXAM PERFORMED/VIEWS:  XR RIBS LEFT W PA CHEST MIN 3 VIEWS  The frontal view was performed utilizing dual energy radiographic technique  FINDINGS:     There is marked enlargement of the superior mediastinum consistent with mass  There is tracheal deviation to the right  There is a small nodular density in the left mid lung field measuring 1 3 cm  No pleural effusions  There is no pneumothorax  No rib fractures are identified  IMPRESSION:     1  No evidence of rib fracture  2   Large superior mediastinal mass  Differential includes lymphoma, metastasis, thyroid goiter, and thymoma  Chest CT recommended for further evaluation  Left mid lung nodule  This can also be evaluated on chest CT

## 2023-02-21 ENCOUNTER — TELEPHONE (OUTPATIENT)
Dept: PSYCHIATRY | Facility: CLINIC | Age: 78
End: 2023-02-21

## 2023-02-21 NOTE — TELEPHONE ENCOUNTER
Called patient regarding referral  Patient is interested in therapy  She is unsure if she wants a female or male provider

## 2023-03-01 ENCOUNTER — APPOINTMENT (OUTPATIENT)
Dept: LAB | Facility: CLINIC | Age: 78
End: 2023-03-01

## 2023-03-01 DIAGNOSIS — E83.52 HYPERCALCEMIA: ICD-10-CM

## 2023-03-01 LAB
25(OH)D3 SERPL-MCNC: 53.8 NG/ML (ref 30–100)
ANION GAP SERPL CALCULATED.3IONS-SCNC: 4 MMOL/L (ref 4–13)
BUN SERPL-MCNC: 17 MG/DL (ref 5–25)
CALCIUM SERPL-MCNC: 10.5 MG/DL (ref 8.3–10.1)
CHLORIDE SERPL-SCNC: 108 MMOL/L (ref 96–108)
CO2 SERPL-SCNC: 26 MMOL/L (ref 21–32)
CREAT SERPL-MCNC: 0.82 MG/DL (ref 0.6–1.3)
GFR SERPL CREATININE-BSD FRML MDRD: 69 ML/MIN/1.73SQ M
GLUCOSE P FAST SERPL-MCNC: 129 MG/DL (ref 65–99)
PHOSPHATE SERPL-MCNC: 3.2 MG/DL (ref 2.3–4.1)
POTASSIUM SERPL-SCNC: 4.5 MMOL/L (ref 3.5–5.3)
PTH-INTACT SERPL-MCNC: 98.6 PG/ML (ref 18.4–80.1)
SODIUM SERPL-SCNC: 138 MMOL/L (ref 135–147)

## 2023-03-02 ENCOUNTER — HOSPITAL ENCOUNTER (OUTPATIENT)
Dept: RADIOLOGY | Facility: HOSPITAL | Age: 78
Discharge: HOME | End: 2023-03-02

## 2023-03-02 ENCOUNTER — TELEPHONE (OUTPATIENT)
Dept: FAMILY MEDICINE CLINIC | Facility: CLINIC | Age: 78
End: 2023-03-02

## 2023-03-02 DIAGNOSIS — E04.9 ENLARGED THYROID GLAND: ICD-10-CM

## 2023-03-02 DIAGNOSIS — J98.59 MEDIASTINAL MASS: ICD-10-CM

## 2023-03-02 DIAGNOSIS — E03.9 ACQUIRED HYPOTHYROIDISM: ICD-10-CM

## 2023-03-02 DIAGNOSIS — E21.3 HYPERPARATHYROIDISM (HCC): Primary | ICD-10-CM

## 2023-03-02 RX ADMIN — IOHEXOL 85 ML: 350 INJECTION, SOLUTION INTRAVENOUS at 11:49

## 2023-03-02 NOTE — TELEPHONE ENCOUNTER
3/2/2023 8:58 AM Called Gene Webber regarding her lab results  We discussed that her calcium is still elevated and she has an elevated parathyroid level  I explained that that means her parathyroid gland is overactive and I referred her to endocrinology  She agreed to go    Order put in chart    Josep Oh, DO

## 2023-03-03 ENCOUNTER — TELEPHONE (OUTPATIENT)
Dept: ENDOCRINOLOGY | Facility: CLINIC | Age: 78
End: 2023-03-03

## 2023-03-09 ENCOUNTER — TELEPHONE (OUTPATIENT)
Dept: FAMILY MEDICINE CLINIC | Facility: CLINIC | Age: 78
End: 2023-03-09

## 2023-03-09 DIAGNOSIS — E07.9 THYROID MASS: Primary | ICD-10-CM

## 2023-03-09 NOTE — TELEPHONE ENCOUNTER
I spoke with [facility] and she will ask to have this read [facility]

## 2023-03-09 NOTE — TELEPHONE ENCOUNTER
Please call the radiology reading room  She had CT of the chest done on 3/2/23 and it is still not read yet  Thank you,  Hamzah Barajas, DO

## 2023-03-09 NOTE — TELEPHONE ENCOUNTER
3/9/2023 11:27 AM Called [facility] regarding her abnormal thyroid ultrasound and CT Chest    She agreed to see Dr Renetta Toor for follow-up of her massive enlargement of her thyroid gland that has extended into her mediastinum  I also reviewed the results with her daughter at her request   I reached out to ENT and they are going to set up an appointment with her as soon as possible      Jil Burkett, DO

## 2023-03-28 ENCOUNTER — HOSPITAL ENCOUNTER (EMERGENCY)
Facility: HOSPITAL | Age: 78
Discharge: HOME | End: 2023-03-28
Attending: EMERGENCY MEDICINE

## 2023-03-28 VITALS
SYSTOLIC BLOOD PRESSURE: 191 MMHG | OXYGEN SATURATION: 100 % | TEMPERATURE: 96.8 F | HEART RATE: 55 BPM | BODY MASS INDEX: 24.24 KG/M2 | RESPIRATION RATE: 17 BRPM | WEIGHT: 141.2 LBS | DIASTOLIC BLOOD PRESSURE: 76 MMHG

## 2023-03-28 DIAGNOSIS — T78.3XXA ANGIOEDEMA, INITIAL ENCOUNTER: Primary | ICD-10-CM

## 2023-03-28 RX ORDER — DIPHENHYDRAMINE HYDROCHLORIDE 50 MG/ML
25 INJECTION INTRAMUSCULAR; INTRAVENOUS ONCE
Status: COMPLETED | OUTPATIENT
Start: 2023-03-28 | End: 2023-03-28

## 2023-03-28 RX ADMIN — DIPHENHYDRAMINE HYDROCHLORIDE 25 MG: 50 INJECTION, SOLUTION INTRAMUSCULAR; INTRAVENOUS at 13:52

## 2023-03-28 NOTE — ED PROVIDER NOTES
"History  Chief Complaint   Patient presents with   • Facial Swelling     States awoke with facial swelling this morning  Took a Benadryl one tablet at 8 am  States she had the same a month ago , lasted a few days  No dental pain  Patient on ACE inhibitor    • Slow Heart Rate     HR is 48, states \" it is always low \"     HPI  Patient is a 46-year-old female history of hypothyroidism on levothyroxine, hypertension on amlodipine and benazepril presenting for evaluation of lip and facial swelling  Patient states that she woke up with symptoms this morning, states that they were initially worse, felt a tight sensation in her face  Patient states that she took a Benadryl and over the course of the past few hours she feels that the swelling has improved  Patient denies any trauma to the area  Patient denies tongue swelling, throat swelling, urticaria, wheezing, nausea, vomiting  Patient denies prior history of allergic reaction however does state that she had a single episode of similar lip swelling which was less severe about a month ago  Prior to Admission Medications   Prescriptions Last Dose Informant Patient Reported? Taking?    PARoxetine (PAXIL) 10 mg tablet   No No   Sig: Take 1 tablet (10 mg total) by mouth daily   amLODIPine (NORVASC) 10 mg tablet   No No   Sig: Take 1 tablet (10 mg total) by mouth daily   benazepril (LOTENSIN) 40 MG tablet   No No   Sig: Take 1 tablet (40 mg total) by mouth daily   levothyroxine 25 mcg tablet   No No   Sig: Take 1 tablet (25 mcg total) by mouth daily   metFORMIN (GLUCOPHAGE) 1000 MG tablet   No No   Sig: Take 1 tablet (1,000 mg total) by mouth 2 (two) times a day with meals      Facility-Administered Medications: None       Past Medical History:   Diagnosis Date   • Breast cancer ([facility] [address] )        Past Surgical History:   Procedure Laterality Date   • HYSTERECTOMY     • MASTECTOMY MODIFIED RADICAL Bilateral    • SPINE SURGERY         Family History   Problem Relation Age of Onset " • Hypertension Father    • Hypertension Sister    • Hypertension Brother      I have reviewed and agree with the history as documented  E-Cigarette/Vaping   • E-Cigarette Use Never User      E-Cigarette/Vaping Substances     Social History     Tobacco Use   • Smoking status: Never     Passive exposure: Never   • Smokeless tobacco: Never   Vaping Use   • Vaping Use: Never used   Substance Use Topics   • Alcohol use: Yes     Alcohol/week: 1 0 standard drink     Types: 1 Glasses of wine per week   • Drug use: Never       Review of Systems   Constitutional: Negative for chills and fever  HENT: Positive for facial swelling  Negative for trouble swallowing and voice change  Respiratory: Negative for shortness of breath  Cardiovascular: Negative for chest pain and leg swelling  Gastrointestinal: Negative for diarrhea, nausea and vomiting  Musculoskeletal: Negative for arthralgias and myalgias  Skin: Negative for color change, pallor, rash and wound  Neurological: Negative for headaches  Physical Exam  Physical Exam  Vitals and nursing note reviewed  Constitutional:       General: She is not in acute distress  Appearance: Normal appearance  She is not ill-appearing, toxic-appearing or diaphoretic  HENT:      Head: Normocephalic and atraumatic  Comments: Patient able to fully open her mouth  No evidence of dental injury or infection  No appreciable tongue swelling  No stridor  Right Ear: External ear normal       Left Ear: External ear normal    Eyes:      General:         Right eye: No discharge  Left eye: No discharge  Pulmonary:      Effort: No respiratory distress  Comments: Lungs clear to auscultation bilaterally without wheezes, rales, rhonchi  Abdominal:      General: There is no distension  Musculoskeletal:         General: No deformity  Cervical back: Normal range of motion  Skin:     Findings: No lesion or rash        Comments: No urticaria Neurological:      Mental Status: She is alert and oriented to person, place, and time  Mental status is at baseline  Psychiatric:         Mood and Affect: Mood and affect normal                  Vital Signs  ED Triage Vitals [03/28/23 1239]   Temperature Pulse Respirations Blood Pressure SpO2   (!) 96 8 °F (36 °C) (!) 48 18 (!) 200/79 99 %      Temp Source Heart Rate Source Patient Position - Orthostatic VS BP Location FiO2 (%)   Tympanic Monitor Sitting Right arm --      Pain Score       No Pain           Vitals:    03/28/23 1239 03/28/23 1300   BP: (!) 200/79 (!) 191/76   Pulse: (!) 48 55   Patient Position - Orthostatic VS: Sitting          Visual Acuity      ED Medications  Medications   diphenhydrAMINE (BENADRYL) injection 25 mg (25 mg Intravenous Given 3/28/23 1352)       Diagnostic Studies  Results Reviewed     None                 No orders to display              Procedures  Procedures         ED Course                                             Medical Decision Making  I obtained history from the patient  Patient without specific allergic exposure and without multisystem involvement to suggest anaphylaxis  Given patient's use of benazepril, concern for ACE inhibitor induced angioedema  Patient nondistressed and feels that symptoms are overall improving  Plan to observe for approximately 1 hour, treat with Benadryl, discharge with discontinuation of benazepril with strict return precautions regarding any worsening of swelling or occurrence of shortness of breath  Patient with slight interval improvement on reassessment roughly 1 hour later  Patient instructed to refrain from taking additional benazepril, follow-up with primary care provider in the next 3 to 5 days, discharged with return precautions  Risk  Prescription drug management            Disposition  Final diagnoses:   Angioedema, initial encounter     Time reflects when diagnosis was documented in both MDM as applicable and the Disposition within this note     Time User Action Codes Description Comment    3/28/2023  2:25 PM Alycia Choi  3XXA] Angioedema, initial encounter       ED Disposition     ED Disposition   Discharge    Condition   Stable    Date/Time   Tue Mar 28, 2023  2:25 PM    Comment   Skye Turcios discharge to home/self care  Follow-up Information     Follow up With Specialties Details Why [address] Keating, DO Family Medicine In 3 days  [address] [facility]  [phone]            Discharge Medication List as of 3/28/2023  2:26 PM      CONTINUE these medications which have NOT CHANGED    Details   amLODIPine (NORVASC) 10 mg tablet Take 1 tablet (10 mg total) by mouth daily, Starting Fri 1/20/2023, Normal      levothyroxine 25 mcg tablet Take 1 tablet (25 mcg total) by mouth daily, Starting Mon 2/20/2023, Normal      metFORMIN (GLUCOPHAGE) 1000 MG tablet Take 1 tablet (1,000 mg total) by mouth 2 (two) times a day with meals, Starting Fri 1/20/2023, Normal      PARoxetine (PAXIL) 10 mg tablet Take 1 tablet (10 mg total) by mouth daily, Starting Fri 1/20/2023, Normal         STOP taking these medications       benazepril (LOTENSIN) 40 MG tablet Comments:   Reason for Stopping:               No discharge procedures on file      PDMP Review     None          ED Provider  Electronically Signed by           Carissa Youssef MD  03/28/23 0716

## 2023-03-28 NOTE — DISCHARGE INSTRUCTIONS
We suspect your symptoms are being caused by angioedema related to your benazepril  Stop taking this medication and follow-up with your primary care provider in the next 3 to 5 days for reassessment  If you have worsening lip swelling, tongue swelling, throat swelling sensation, shortness of breath, fevers, chills, then return to the emergency department for reassessment

## 2023-03-31 ENCOUNTER — OFFICE VISIT (OUTPATIENT)
Dept: FAMILY MEDICINE CLINIC | Facility: CLINIC | Age: 78
End: 2023-03-31

## 2023-03-31 VITALS
WEIGHT: 140.2 LBS | DIASTOLIC BLOOD PRESSURE: 58 MMHG | TEMPERATURE: 97.9 F | BODY MASS INDEX: 23.93 KG/M2 | HEIGHT: 64 IN | RESPIRATION RATE: 18 BRPM | SYSTOLIC BLOOD PRESSURE: 134 MMHG | HEART RATE: 60 BPM

## 2023-03-31 DIAGNOSIS — E11.9 TYPE 2 DIABETES MELLITUS WITHOUT COMPLICATION, WITHOUT LONG-TERM CURRENT USE OF INSULIN (HCC): ICD-10-CM

## 2023-03-31 DIAGNOSIS — I10 ESSENTIAL HYPERTENSION: ICD-10-CM

## 2023-03-31 DIAGNOSIS — R19.7 DIARRHEA, UNSPECIFIED TYPE: Primary | ICD-10-CM

## 2023-03-31 NOTE — ASSESSMENT & PLAN NOTE
For now, BP is WNL on Amlodipine single agent, DBP is actually low  She will call with her readings after 1-2 weeks

## 2023-03-31 NOTE — PROGRESS NOTES
Assessment/Plan:    Will check stool studies  She was on Cipro for cellulitis a couple of months before diarrhea started  1  Diarrhea, unspecified type  -     Clostridium difficile toxin by PCR; Future  -     Stool Enteric Bacterial Panel by PCR; Future    2  Essential hypertension  Assessment & Plan: For now, BP is WNL on Amlodipine single agent, DBP is actually low  She will call with her readings after 1-2 weeks  3  Type 2 diabetes mellitus without complication, without long-term current use of insulin ([facility])  Assessment & Plan:  stable  Lab Results   Component Value Date    HGBA1C 6 2 (H) 01/27/2023                 There are no Patient Instructions on file for this visit  Return for Next scheduled follow up  Subjective:      Patient ID: Compa Power is a 69 y o  female  Chief Complaint   Patient presents with   • Follow-up     ER f/u Angioedema, high BP nm lpn       Here today for ER f/u  Reports she has had about 4 episodes of facial/lip swelling, that previously resolved spontaneously  Reports this gets worse each time, prompting ER visit  Has not taken Benazapril since the symptoms started  Reports overall improved, has some mild residual sensation of swelling left side of face  Also reports diarrhea  t his has been ongoing for the past couple of month  Stool is very foul smelling  No abdomina pain, n/v, but feels gassy  The following portions of the patient's history were reviewed and updated as appropriate: allergies, current medications, past family history, past medical history, past social history, past surgical history and problem list     Review of Systems   Constitutional: Negative  HENT: Negative  Respiratory: Negative  Cardiovascular: Negative  Gastrointestinal: Positive for diarrhea  Neurological: Negative            Current Outpatient Medications   Medication Sig Dispense Refill   • amLODIPine (NORVASC) 10 mg tablet Take 1 tablet (10 mg total) by "mouth daily 90 tablet 1   • levothyroxine 25 mcg tablet Take 1 tablet (25 mcg total) by mouth daily 90 tablet 1   • metFORMIN (GLUCOPHAGE) 1000 MG tablet Take 1 tablet (1,000 mg total) by mouth 2 (two) times a day with meals 90 tablet 1   • PARoxetine (PAXIL) 10 mg tablet Take 1 tablet (10 mg total) by mouth daily 90 tablet 1     No current facility-administered medications for this visit  Objective:    /58   Pulse 60   Temp 97 9 °F (36 6 °C)   Resp 18   Ht 5' 4\" (1 626 m)   Wt 63 6 kg (140 lb 3 2 oz)   BMI 24 07 kg/m²        Physical Exam  Vitals and nursing note reviewed  Constitutional:       General: She is not in acute distress  Appearance: Normal appearance  She is well-developed  She is not ill-appearing  HENT:      Head:      Comments: No facial/lip swelling     Mouth/Throat:      Pharynx: Oropharynx is clear  Cardiovascular:      Rate and Rhythm: Normal rate and regular rhythm  Pulses: Normal pulses  Heart sounds: Normal heart sounds  No murmur heard  Pulmonary:      Effort: Pulmonary effort is normal       Breath sounds: Normal breath sounds  Abdominal:      General: Bowel sounds are normal    Musculoskeletal:      Comments: LUE significant lymphedema   Skin:     General: Skin is warm and dry  Neurological:      Mental Status: She is alert     Psychiatric:         Mood and Affect: Mood normal          Behavior: Behavior normal                 Floy Halsted, CRNP  "

## 2023-04-04 ENCOUNTER — APPOINTMENT (OUTPATIENT)
Dept: LAB | Facility: CLINIC | Age: 78
End: 2023-04-04

## 2023-04-04 DIAGNOSIS — R19.7 DIARRHEA, UNSPECIFIED TYPE: ICD-10-CM

## 2023-04-05 LAB
C DIFF TOX GENS STL QL NAA+PROBE: NEGATIVE
CAMPYLOBACTER DNA SPEC NAA+PROBE: NORMAL
SALMONELLA DNA SPEC QL NAA+PROBE: NORMAL
SHIGA TOXIN STX GENE SPEC NAA+PROBE: NORMAL
SHIGELLA DNA SPEC QL NAA+PROBE: NORMAL

## 2023-04-27 ENCOUNTER — OFFICE VISIT (OUTPATIENT)
Dept: CARDIAC SURGERY | Facility: CLINIC | Age: 78
End: 2023-04-27

## 2023-04-27 VITALS
HEART RATE: 60 BPM | SYSTOLIC BLOOD PRESSURE: 122 MMHG | HEIGHT: 64 IN | DIASTOLIC BLOOD PRESSURE: 77 MMHG | OXYGEN SATURATION: 99 % | TEMPERATURE: 97.6 F | WEIGHT: 138.67 LBS | RESPIRATION RATE: 16 BRPM | BODY MASS INDEX: 23.67 KG/M2

## 2023-04-27 DIAGNOSIS — E04.9 SUBSTERNAL GOITER: Primary | ICD-10-CM

## 2023-04-27 NOTE — LETTER
April 27, 2023     Jhon Woodward  [address]     Patient: Enzo Tracey   YOB: 1945   Date of Visit: 4/27/2023       Dear Dr Hany Garcia:    Thank you for referring Enzo Tracey to me for evaluation  Below are my notes for this consultation  If you have questions, please do not hesitate to call me  I look forward to following your patient along with you  Sincerely,        Stefano Campbell MD        CC: DO Stefano Kiser MD  4/27/2023  1:00 PM  Incomplete  Thoracic Consult  Assessment/Plan:    Substernal goiter  Ms Dotty Martines is a 43-year-old female who presents to me with a substernal goiter  She was referred by Dr Hany aGrcia of otolaryngology  I have personally reviewed all of her imaging in PACS  I have reviewed the CT scan of the chest which demonstrates a substernal goiter on both the left on the right  On the left, this goes down to approximately the arch of the aorta and appears to be directly communicating with the thyroid  On the right, the goiter is questionably ectopic and it is posterior to the airway  Today in the office, we discussed that I will be available on the day of surgery to potentially perform a median sternotomy  I explained to her that median sternotomy would be an appropriate incision in order to assist with bringing the left-sided substernal goiter up into the neck  However, based upon the location of the right goiter, I do believe that this would be better approached via a robotic resection from the lateral decubitus position  I explained to her that on the day of surgery, if the right-sided goiter appears to not be able to be lifted up into the cervical region, then we may leave that behind and on a separate day, I would plan to perform a robotic resection of that mass  She understands  Consent was obtained for possible median sternotomy    I will be available on her day of surgery  My  will coordinate this with Dr Doug Colorado   Casi Mascorro MD  Thoracic Surgery  (Available on [employer])  Office: [phone]        Diagnoses and all orders for this visit:    Substernal goiter         Thoracic History   Diagnosis:    Procedures/Surgeries:    Pathology:    Adjuvant Therapy:      Subjective:   Patient ID: Carmen Lopez is a 69 y o  female  HPI  Ms Abdirahman Samaniego is a 67-year-old female who presents to me with a substernal goiter  She was referred by Dr Shea Medellin of otolaryngology  I have personally reviewed all of her imaging in PACS  I have reviewed the CT scan of the chest which demonstrates a substernal goiter on both the left on the right  On the left, this goes down to approximately the arch of the aorta and appears to be directly communicating with the thyroid  On the right, the goiter is questionably ectopic and it is posterior to the airway  Today in the office, she reports that she has had a goiter for a long time but that it has not cause problems until recently  It has started to grow and she does feel pressure in her neck from time to time  She denies swallowing difficulty  The following portions of the patient's history were reviewed and updated as appropriate: allergies, current medications, past family history, past medical history, past social history, past surgical history and problem list     Past Medical History:   Diagnosis Date   • Breast cancer ([facility] [address] )       Past Surgical History:   Procedure Laterality Date   • HYSTERECTOMY     • MASTECTOMY MODIFIED RADICAL Bilateral    • SPINE SURGERY        Family History   Problem Relation Age of Onset   • Hypertension Father    • Hypertension Sister    • Hypertension Brother       Social History     Socioeconomic History   • Marital status:       Spouse name: Not on file   • Number of children: Not on file   • Years of education: Not on file   • Highest education level: Not on file Occupational History   • Not on file   Tobacco Use   • Smoking status: Never     Passive exposure: Never   • Smokeless tobacco: Never   Vaping Use   • Vaping Use: Never used   Substance and Sexual Activity   • Alcohol use: Yes     Alcohol/week: 1 0 standard drink     Types: 1 Glasses of wine per week   • Drug use: Never   • Sexual activity: Not Currently   Other Topics Concern   • Not on file   Social History Narrative   • Not on file     Social Determinants of Health     Financial Resource Strain: Not on file   Food Insecurity: Not on file   Transportation Needs: Not on file   Physical Activity: Not on file   Stress: Not on file   Social Connections: Not on file   Intimate Partner Violence: Not on file   Housing Stability: Not on file      Review of Systems   Constitutional: Negative for chills, fatigue, fever and unexpected weight change  HENT: Negative  Negative for trouble swallowing  Eyes: Negative  Negative for visual disturbance  Respiratory: Negative for cough, shortness of breath and stridor  Cardiovascular: Negative for chest pain  Gastrointestinal: Negative  Endocrine: Negative  Genitourinary: Negative  Musculoskeletal: Negative  Skin: Negative  Neurological: Negative for dizziness, light-headedness and headaches  Hematological: Negative for adenopathy  Psychiatric/Behavioral: Negative  Objective:  Physical Exam  Vitals and nursing note reviewed  Constitutional:       General: She is not in acute distress  Appearance: Normal appearance  She is well-developed  She is not diaphoretic  HENT:      Head: Normocephalic and atraumatic  Nose: Nose normal  No congestion or rhinorrhea  Mouth/Throat:      Mouth: Mucous membranes are moist       Pharynx: Oropharynx is clear  No oropharyngeal exudate  Eyes:      General: No scleral icterus  Pupils: Pupils are equal, round, and reactive to light  Neck:      Trachea: No tracheal deviation  "Comments: Large mass palpable in the neck  Cardiovascular:      Rate and Rhythm: Normal rate and regular rhythm  Pulses: Normal pulses  Heart sounds: Normal heart sounds  No murmur heard  Pulmonary:      Effort: Pulmonary effort is normal  No respiratory distress  Breath sounds: Normal breath sounds  No stridor  No wheezing or rales  Chest:      Chest wall: No tenderness  Abdominal:      General: Bowel sounds are normal  There is no distension  Palpations: Abdomen is soft  Tenderness: There is no abdominal tenderness  There is no rebound  Musculoskeletal:         General: Normal range of motion  Cervical back: Normal range of motion and neck supple  No muscular tenderness  Lymphadenopathy:      Cervical: No cervical adenopathy  Skin:     General: Skin is warm and dry  Coloration: Skin is not jaundiced or pale  Findings: No erythema or rash  Neurological:      General: No focal deficit present  Mental Status: She is alert and oriented to person, place, and time  Psychiatric:         Mood and Affect: Mood normal          Behavior: Behavior normal          Thought Content: Thought content normal          Judgment: Judgment normal      /77 (BP Location: Right arm, Patient Position: Sitting, Cuff Size: Standard)   Pulse 60   Temp 97 6 °F (36 4 °C) (Temporal)   Resp 16   Ht 5' 4\" (1 626 m)   Wt 62 9 kg (138 lb 10 7 oz)   SpO2 99%   BMI 23 80 kg/m²    No Chest XR results available for this patient  CT chest w contrast    Result Date: 3/9/2023  Narrative CT CHEST WITH IV CONTRAST INDICATION:   J98 59: Other diseases of mediastinum, not elsewhere classified  Abnormal chest x-ray, mediastinal mass, left upper lobe lung nodule COMPARISON:  Chest x-ray February 11, 2023, thyroid ultrasound the same day TECHNIQUE: CT examination of the chest was performed   Axial, sagittal, and coronal 2D reformatted images were created from the source data and submitted " for interpretation  Radiation dose length product (DLP) for this visit:  414 67 mGy-cm   This examination, like all CT scans performed in the [facility], was performed utilizing techniques to minimize radiation dose exposure, including the use of iterative  reconstruction and automated exposure control  IV Contrast:  85 mL of iohexol (OMNIPAQUE) FINDINGS: LUNGS:  Lungs are clear  There is no tracheal or endobronchial lesion  PLEURA:  Unremarkable  HEART/GREAT VESSELS: Heart is unremarkable for patient's age  No thoracic aortic aneurysm  MEDIASTINUM AND DARRIUS:  Marked enlargement of the thyroid gland is seen with the left more than right lobe enlargement  Innumerable thyroid nodules are seen, some of them calcified  Thyroid isthmus is also markedly enlarged  There is retrosternal extension of goiter  Tracheal compression and deviation to the right is seen  There is a large exophytic nodule arising from the right lobe of the thyroid which extends in the superior mediastinum  CHEST WALL AND LOWER NECK:  Status post mastectomy with breast reconstruction  VISUALIZED STRUCTURES IN THE UPPER ABDOMEN:  Patient is status post cholecystectomy  Atherosclerotic disease of the abdominal aorta is seen  Thickening of the left adrenal gland is noted  OSSEOUS STRUCTURES:  Spinal degenerative changes are noted  Probably old superior endplate compression fracture at L2  No definite acute fracture or destructive osseous lesion  Impression 1  Massive enlargement of the thyroid gland with innumerable nodules and extension in the mediastinum  There is mass effect with displacement and compression of the trachea  Surgical consultation should be considered  Thyroid ultrasound was already performed  2   No lung nodules  The study was marked in EPIC for significant notification  Workstation performed: [id number]     No CT Chest,Abdomen,Pelvis results available for this patient     No NM PET CT results available for this patient  No Barium Swallow results available for this patient  Ana Bardales MD  4/27/2023 11:45 AM  Incomplete  Thoracic Consult  Assessment/Plan:    No problem-specific Assessment & Plan notes found for this encounter  There are no diagnoses linked to this encounter  Thoracic History   Diagnosis:    Procedures/Surgeries:    Pathology:    Adjuvant Therapy:      Subjective:   Patient ID: Jovani Mack is a 69 y o  female  HPI    The following portions of the patient's history were reviewed and updated as appropriate: allergies, current medications, past family history, past medical history, past social history, past surgical history and problem list     Past Medical History:   Diagnosis Date   • [facility]       Past Surgical History:   Procedure Laterality Date   • HYSTERECTOMY     • MASTECTOMY MODIFIED RADICAL Bilateral    • SPINE SURGERY        Family History   Problem Relation Age of Onset   • Hypertension Father    • Hypertension Sister    • Hypertension Brother       Social History     Socioeconomic History   • Marital status:      Spouse name: Not on file   • Number of children: Not on file   • Years of education: Not on file   • Highest education level: Not on file   Occupational History   • Not on file   Tobacco Use   • Smoking status: Never     Passive exposure: Never   • Smokeless tobacco: Never   Vaping Use   • Vaping Use: Never used   Substance and Sexual Activity   • Alcohol use:  Yes     Alcohol/week: 1 0 standard drink     Types: 1 Glasses of wine per week   • Drug use: Never   • Sexual activity: Not Currently   Other Topics Concern   • Not on file   Social History Narrative   • Not on file     Social Determinants of Health     Financial Resource Strain: Not on file   Food Insecurity: Not on file   Transportation Needs: Not on file   Physical Activity: Not on file   Stress: Not on file   Social Connections: Not on file   Intimate Partner Violence: "Not on file   Housing Stability: Not on file      Review of Systems     Objective:  Physical ExamBP 122/77 (BP Location: Right arm, Patient Position: Sitting, Cuff Size: Standard)   Pulse 60   Temp 97 6 °F (36 4 °C) (Temporal)   Resp 16   Ht 5' 4\" (1 626 m)   Wt 62 9 kg (138 lb 10 7 oz)   SpO2 99%   BMI 23 80 kg/m²    No Chest XR results available for this patient  CT chest w contrast    Result Date: 3/9/2023  Narrative CT CHEST WITH IV CONTRAST INDICATION:   J98 59: Other diseases of mediastinum, not elsewhere classified  Abnormal chest x-ray, mediastinal mass, left upper lobe lung nodule COMPARISON:  Chest x-ray February 11, 2023, thyroid ultrasound the same day TECHNIQUE: CT examination of the chest was performed  Axial, sagittal, and coronal 2D reformatted images were created from the source data and submitted for interpretation  Radiation dose length product (DLP) for this visit:  414 67 mGy-cm   This examination, like all CT scans performed in the [facility], was performed utilizing techniques to minimize radiation dose exposure, including the use of iterative  reconstruction and automated exposure control  IV Contrast:  85 mL of iohexol (OMNIPAQUE) FINDINGS: LUNGS:  Lungs are clear  There is no tracheal or endobronchial lesion  PLEURA:  Unremarkable  HEART/GREAT VESSELS: Heart is unremarkable for patient's age  No thoracic aortic aneurysm  MEDIASTINUM AND DARRIUS:  Marked enlargement of the thyroid gland is seen with the left more than right lobe enlargement  Innumerable thyroid nodules are seen, some of them calcified  Thyroid isthmus is also markedly enlarged  There is retrosternal extension of goiter  Tracheal compression and deviation to the right is seen  There is a large exophytic nodule arising from the right lobe of the thyroid which extends in the superior mediastinum  CHEST WALL AND LOWER NECK:  Status post mastectomy with breast reconstruction   VISUALIZED STRUCTURES IN " THE UPPER ABDOMEN:  Patient is status post cholecystectomy  Atherosclerotic disease of the abdominal aorta is seen  Thickening of the left adrenal gland is noted  OSSEOUS STRUCTURES:  Spinal degenerative changes are noted  Probably old superior endplate compression fracture at L2  No definite acute fracture or destructive osseous lesion  Impression 1  Massive enlargement of the thyroid gland with innumerable nodules and extension in the mediastinum  There is mass effect with displacement and compression of the trachea  Surgical consultation should be considered  Thyroid ultrasound was already performed  2   No lung nodules  The study was marked in EPIC for significant notification  Workstation performed: [id number]     No CT Chest,Abdomen,Pelvis results available for this patient  No NM PET CT results available for this patient  No Barium Swallow results available for this patient

## 2023-04-27 NOTE — ASSESSMENT & PLAN NOTE
Ms Marcelo Fan is a 45-year-old female who presents to me with a substernal goiter  She was referred by Dr Gege Long of otolaryngology  I have personally reviewed all of her imaging in PACS  I have reviewed the CT scan of the chest which demonstrates a substernal goiter on both the left on the right  On the left, this goes down to approximately the arch of the aorta and appears to be directly communicating with the thyroid  On the right, the goiter is questionably ectopic and it is posterior to the airway  Today in the office, we discussed that I will be available on the day of surgery to potentially perform a median sternotomy  I explained to her that median sternotomy would be an appropriate incision in order to assist with bringing the left-sided substernal goiter up into the neck  However, based upon the location of the right goiter, I do believe that this would be better approached via a robotic resection from the lateral decubitus position  I explained to her that on the day of surgery, if the right-sided goiter appears to not be able to be lifted up into the cervical region, then we may leave that behind and on a separate day, I would plan to perform a robotic resection of that mass  She understands  Consent was obtained for possible median sternotomy  I will be available on her day of surgery  My  will coordinate this with Dr Luis Bullock       Laina Ramos MD  Thoracic Surgery  (Available on [employer] Text)  Office: [phone]

## 2023-05-22 ENCOUNTER — RA CDI HCC (OUTPATIENT)
Dept: OTHER | Facility: HOSPITAL | Age: 78
End: 2023-05-22

## 2023-05-22 NOTE — PROGRESS NOTES
[facility]  coding opportunities       Chart reviewed, no opportunity found:   [address]        Patients Insurance     Medicare Insurance: [facility]

## 2023-05-25 NOTE — PROGRESS NOTES
Presurgical Evaluation    Subjective:      Patient ID: Jessee Crisostomosins is a 69 y o  female  Chief Complaint   Patient presents with   • Pre-op Exam     Surgery 6/21/23 nm lpn       She has enlarging thyroid mass  She is not choking  The mass has been there for over a year  The following portions of the patient's history were reviewed and updated as appropriate: allergies, current medications, past family history, past medical history, past social history, past surgical history and problem list     Procedure date: 6/21/23    Surgeon:  Dr Valeriy Guallpa  Planned procedure: Total thyroidectomy  Diagnosis for procedure: Thyroid mass    Prior anesthesia: Yes   General; Complications:  None / Tolerated well    CAD History: None   NOTE: Patient should see Cardiology if time available before surgery, and if appropriate  Pulmonary History: None    Renal history: None    Diabetes History:  Type 2  Controlled     Neurological History: None     Personal history of venous thromboembolic disease? No    History of steroid use for >2 weeks within last year? No    Bleeding Risk: no recent abnormal bleeding    Current Anti-platelet/anticoagulation medication: none    On Immunosuppressant meds/biologics: No      Review of Systems   Constitutional: Negative for fatigue  Respiratory: Negative for shortness of breath  Cardiovascular: Negative for chest pain and leg swelling  Current Outpatient Medications   Medication Sig Dispense Refill   • amLODIPine (NORVASC) 10 mg tablet Take 1 tablet (10 mg total) by mouth daily 90 tablet 1   • levothyroxine 25 mcg tablet Take 1 tablet (25 mcg total) by mouth daily 90 tablet 1   • metFORMIN (GLUCOPHAGE) 1000 MG tablet Take 1 tablet (1,000 mg total) by mouth 2 (two) times a day with meals 90 tablet 1   • PARoxetine (PAXIL) 20 mg tablet Take 1 tablet (20 mg total) by mouth daily 90 tablet 1     No current facility-administered medications for this visit         Allergies on "file:   Ace inhibitors and Other    Patient Active Problem List   Diagnosis   • Essential hypertension   • Type 2 diabetes mellitus without complication, without long-term current use of insulin (HCC)   • Acquired hypothyroidism   • Generalized anxiety disorder   • Lymphedema of left arm   • History of breast cancer   • [facility]   • Substernal goiter        Past Medical History:   Diagnosis Date   • [facility] ([facility] )        Past Surgical History:   Procedure Laterality Date   • HYSTERECTOMY     • MASTECTOMY MODIFIED RADICAL Bilateral    • SPINE SURGERY         Family History   Problem Relation Age of Onset   • Hypertension Father    • Hypertension Sister    • Hypertension Brother        Social History     Tobacco Use   • Smoking status: Never     Passive exposure: Never   • Smokeless tobacco: Never   Vaping Use   • Vaping Use: Never used   Substance Use Topics   • Alcohol use: Yes     Alcohol/week: 1 0 standard drink of alcohol     Types: 1 Glasses of wine per week   • Drug use: Never       Objective:    Vitals:    05/30/23 1009   BP: 146/68   Pulse: (!) 54   Resp: 16   Temp: (!) 97 2 °F (36 2 °C)   Weight: 64 5 kg (142 lb 3 2 oz)   Height: 5' 4\" (1 626 m)        Physical Exam  Vitals and nursing note reviewed  Constitutional:       Appearance: She is well-developed  HENT:      Head: Normocephalic and atraumatic  Right Ear: Tympanic membrane and external ear normal       Left Ear: Tympanic membrane and external ear normal    Neck:      Comments: Large mass right side of neck  Cardiovascular:      Rate and Rhythm: Normal rate and regular rhythm  Heart sounds: Normal heart sounds  No murmur heard  No friction rub  Pulmonary:      Effort: Pulmonary effort is normal  No respiratory distress  Breath sounds: Normal breath sounds  No wheezing or rales  Musculoskeletal:      Right lower leg: No edema  Left lower leg: No edema             Preop labs/testing available and " reviewed: no               EKG yes        Functional capacity: Climb stairs                        4 Mets   Pick the highest level patient can comfortably perform   4 mets or greater for surgery    RCRI  High Risk surgery? 1 Point  CAD History:         1 Point   MI; Positive Stress Test; CP due to Mi;  Nitrate Usage to control Angina; Pathologic Q wave on EKG  CHF Active:         1 Point   Pulm Edema; Paroxysmal Nocturnal Dyspnea;  Bibasilar Rales (crackles);S3; CHF on CXR  Cerebrovascular Disease (TIA or CVA):     1 Point  DM on Insulin:        1 Point  Serum Creat >2 0 mg/dl:       1 Point          Total Points: 0     Scorin: Class I, Very Low Risk (0 4%)     1: Class II, Low risk (0 9%)     2: Class III Moderate (6 6%)     3: Class IV High (>11%)      SYLVESTER Risk:  GFR:        For PCP:  If GFR>60, Hold ACE/ARB/Diuretic on the day of surgery, and NSAIDS 10 days before  If GFR<45, Consider PRE and POST op Nephrology Consult  If 46 <GFR> 59 : Has Patient had SYLVESTER in last 6 Months? no   If YES: Preop Nephrology consult   If No:  [address] Nephrology consult  Assessment/Plan:    Awaiting labs to be done today for clearance  If labs are acceptable she will be optimized for surgery  Postop concerns: no    Medication Management/Recommendations:  Hold metformin day of surgery    1  Preoperative examination  -     POCT ECG    2  Thyroid mass    3  Type 2 diabetes mellitus without complication, without long-term current use of insulin ([facility] )  Assessment & Plan:    Lab Results   Component Value Date    HGBA1C 6 2 (H) 2023     Well controlled  Advised to hold metformin the day of her surgery      4  Essential hypertension    5  Generalized anxiety disorder  Assessment & Plan:  Not controlled  Dose increased from 10 to 20 mg of Paxil     Orders:  -     PARoxetine (PAXIL) 20 mg tablet; Take 1 tablet (20 mg total) by mouth daily    6  Acquired hypothyroidism  -     TSH, 3rd generation;  Future  - "  T4, free; Future    7  Abnormal EKG  Comments:  has long standing history of low heart rate; asymptomatic       5/31/23 Addendum  Preoperative lab results reviewed  Patient is clear for upcoming thyroid surgery  Dr Angel Donohue      No outpatient medications have been marked as taking for the 5/30/23 encounter (Appointment) with José Wetzel,   NOTE: Please use the above to review important meds for your specialty, the remainder \"per anesthesia Guidelines  \"    NOTE: Please place an Inbasket message for \"SOC\" pool for complicated patients       "

## 2023-05-30 ENCOUNTER — OFFICE VISIT (OUTPATIENT)
Dept: FAMILY MEDICINE CLINIC | Facility: CLINIC | Age: 78
End: 2023-05-30

## 2023-05-30 ENCOUNTER — APPOINTMENT (OUTPATIENT)
Dept: LAB | Facility: CLINIC | Age: 78
End: 2023-05-30

## 2023-05-30 VITALS
SYSTOLIC BLOOD PRESSURE: 146 MMHG | HEIGHT: 64 IN | BODY MASS INDEX: 24.28 KG/M2 | HEART RATE: 54 BPM | WEIGHT: 142.2 LBS | TEMPERATURE: 97.2 F | RESPIRATION RATE: 16 BRPM | DIASTOLIC BLOOD PRESSURE: 68 MMHG

## 2023-05-30 DIAGNOSIS — Z01.818 PREOPERATIVE EXAMINATION: Primary | ICD-10-CM

## 2023-05-30 DIAGNOSIS — Z01.818 PRE-OPERATIVE EXAMINATION: ICD-10-CM

## 2023-05-30 DIAGNOSIS — E03.9 ACQUIRED HYPOTHYROIDISM: ICD-10-CM

## 2023-05-30 DIAGNOSIS — E04.9 SUBSTERNAL GOITER: ICD-10-CM

## 2023-05-30 DIAGNOSIS — F41.1 GENERALIZED ANXIETY DISORDER: ICD-10-CM

## 2023-05-30 DIAGNOSIS — E07.9 THYROID MASS: ICD-10-CM

## 2023-05-30 DIAGNOSIS — E11.9 TYPE 2 DIABETES MELLITUS WITHOUT COMPLICATION, WITHOUT LONG-TERM CURRENT USE OF INSULIN (HCC): ICD-10-CM

## 2023-05-30 DIAGNOSIS — I10 ESSENTIAL HYPERTENSION: ICD-10-CM

## 2023-05-30 DIAGNOSIS — R94.31 ABNORMAL EKG: ICD-10-CM

## 2023-05-30 LAB
ANION GAP SERPL CALCULATED.3IONS-SCNC: 2 MMOL/L (ref 4–13)
BASOPHILS # BLD AUTO: 0.02 THOUSANDS/ÂΜL (ref 0–0.1)
BASOPHILS NFR BLD AUTO: 0 % (ref 0–1)
BUN SERPL-MCNC: 17 MG/DL (ref 5–25)
CALCIUM SERPL-MCNC: 9.9 MG/DL (ref 8.3–10.1)
CHLORIDE SERPL-SCNC: 110 MMOL/L (ref 96–108)
CO2 SERPL-SCNC: 27 MMOL/L (ref 21–32)
CREAT SERPL-MCNC: 0.86 MG/DL (ref 0.6–1.3)
EOSINOPHIL # BLD AUTO: 0.09 THOUSAND/ÂΜL (ref 0–0.61)
EOSINOPHIL NFR BLD AUTO: 2 % (ref 0–6)
ERYTHROCYTE [DISTWIDTH] IN BLOOD BY AUTOMATED COUNT: 14.5 % (ref 11.6–15.1)
GFR SERPL CREATININE-BSD FRML MDRD: 65 ML/MIN/1.73SQ M
GLUCOSE P FAST SERPL-MCNC: 134 MG/DL (ref 65–99)
HCT VFR BLD AUTO: 42.3 % (ref 34.8–46.1)
HGB BLD-MCNC: 13.5 G/DL (ref 11.5–15.4)
IMM GRANULOCYTES # BLD AUTO: 0.01 THOUSAND/UL (ref 0–0.2)
IMM GRANULOCYTES NFR BLD AUTO: 0 % (ref 0–2)
LYMPHOCYTES # BLD AUTO: 1.76 THOUSANDS/ÂΜL (ref 0.6–4.47)
LYMPHOCYTES NFR BLD AUTO: 29 % (ref 14–44)
MCH RBC QN AUTO: 26.4 PG (ref 26.8–34.3)
MCHC RBC AUTO-ENTMCNC: 31.9 G/DL (ref 31.4–37.4)
MCV RBC AUTO: 83 FL (ref 82–98)
MONOCYTES # BLD AUTO: 0.41 THOUSAND/ÂΜL (ref 0.17–1.22)
MONOCYTES NFR BLD AUTO: 7 % (ref 4–12)
NEUTROPHILS # BLD AUTO: 3.7 THOUSANDS/ÂΜL (ref 1.85–7.62)
NEUTS SEG NFR BLD AUTO: 62 % (ref 43–75)
NRBC BLD AUTO-RTO: 0 /100 WBCS
PLATELET # BLD AUTO: 258 THOUSANDS/UL (ref 149–390)
PMV BLD AUTO: 10.2 FL (ref 8.9–12.7)
POTASSIUM SERPL-SCNC: 3.7 MMOL/L (ref 3.5–5.3)
RBC # BLD AUTO: 5.12 MILLION/UL (ref 3.81–5.12)
SODIUM SERPL-SCNC: 139 MMOL/L (ref 135–147)
T4 FREE SERPL-MCNC: 1.18 NG/DL (ref 0.61–1.12)
TSH SERPL DL<=0.05 MIU/L-ACNC: 0.38 UIU/ML (ref 0.45–4.5)
WBC # BLD AUTO: 5.99 THOUSAND/UL (ref 4.31–10.16)

## 2023-05-30 RX ORDER — PAROXETINE HYDROCHLORIDE 20 MG/1
20 TABLET, FILM COATED ORAL DAILY
Qty: 90 TABLET | Refills: 1 | Status: SHIPPED | OUTPATIENT
Start: 2023-05-30

## 2023-05-30 NOTE — ASSESSMENT & PLAN NOTE
Lab Results   Component Value Date    HGBA1C 6 2 (H) 01/27/2023     Well controlled  Advised to hold metformin the day of her surgery

## 2023-05-31 ENCOUNTER — TELEPHONE (OUTPATIENT)
Dept: FAMILY MEDICINE CLINIC | Facility: CLINIC | Age: 78
End: 2023-05-31

## 2023-05-31 NOTE — TELEPHONE ENCOUNTER
5/31/2023 2:27 PM Called Simone Harpervitoronni to let her know that she is cleared for surgery  TSH is a little low, but better than her previous level  I tried calling her, but her voice mail is full  I then called her daughter Maynor Lopez (communication consent on file) and gave her the update      Guera Abraham DO

## 2023-06-08 ENCOUNTER — TELEPHONE (OUTPATIENT)
Dept: PSYCHIATRY | Facility: CLINIC | Age: 78
End: 2023-06-08

## 2023-06-08 NOTE — TELEPHONE ENCOUNTER
I was unable to leave a message for pt to return call to intake in regards to scheduling services because mailbox is full

## 2023-06-13 RX ORDER — MULTIVITAMIN
1 CAPSULE ORAL DAILY
COMMUNITY

## 2023-06-13 NOTE — PRE-PROCEDURE INSTRUCTIONS
Pre-Surgery Instructions:   Medication Instructions   • amLODIPine (NORVASC) 10 mg tablet Take day of surgery  • levothyroxine 25 mcg tablet Take day of surgery  • metFORMIN (GLUCOPHAGE) 1000 MG tablet Hold day of surgery  • Multiple Vitamin (multivitamin) capsule Stop taking 7 days prior to surgery  • PARoxetine (PAXIL) 20 mg tablet Take day of surgery  Pt denies fever, sob, sore throat and cough  Pt verbalized understanding of shower and med instructions  Pt instructed to stop nsaids and supplements one week prior to surgery

## 2023-06-15 NOTE — TELEPHONE ENCOUNTER
Reached out to patient in regards to scheduling talk therapy with Gilberto Fortune, completed intake when offered appointment for 6/16/23 pt advised had prior arrangements for upcomming surgery  Pt asked if writer could reach out to her afterwards  Writer informed will follow-up accordingly

## 2023-06-15 NOTE — TELEPHONE ENCOUNTER
"Behavioral Health Outpatient Intake Questions    Referred By   : Dr Mullen     Please advise interviewee that they need to answer all questions truthfully to allow for best care, and any misrepresentations of information may affect their ability to be seen at this clinic   => Was this discussed? Yes     If Minor Child (under age 25)    Who is/are the legal guardian(s) of the child? Is there a custody agreement? No     • If \"YES\"- Custody orders must be obtained prior to scheduling the first appointment  • In addition, Consent to Treatment must be signed by all legal guardians prior to scheduling the first appointment    • If \"NO\"- Consent to Treatment must be signed by all legal guardians prior to scheduling the first appointment    Behavioral Health Outpatient Intake History -     Presenting Problem (in patient's own words): Scared of everything,a lot of fear, worry of personal and everyday life  Has anxiety  Are there any communication barriers for this patient? No                                               If yes, please describe barriers:   • If there is a unique situation, please refer to [facility] for final determination  Are you taking any psychiatric medications? Yes   •   If \"YES\" -What are they  Paxil   •   If \"YES\" -Who prescribes? Dr Mullen    Has the Patient previously received outpatient Talk Therapy or Medication Management from [facility]  No     •    If \"YES\"- When, Where and with Whom? •    If \"NO\" -Has Patient received these services elsewhere? •   If \"YES\" -When, Where, and with Whom? Has the Patient abused alcohol or other substances in the last 6 months ? No  No concerns of substance abuse are reported  •  If \"YES\" -What substance, How much, How often? •  If illegal substance: Refer to [facility] (for LETITIA) or [employer]    •  If Alcohol in excess of 10 drinks per week:  Refer to [facility] (for LETITIA) or [employer] " "History-     Is this treatment court ordered? No   If \"yes \"send to :  • Talk Therapy : Send to [facility] for final determination   • Med Management: Send to Dr Gonzalez Jordan for final determination     Has the Patient been convicted of a felony? No   If \"Yes\" send to -When, What? • Talk Therapy : Send to [facility] for final determination   • Med Management: Send to Dr Gonzalez Jordan for final determination     ACCEPTED as a patient   • If \"Yes\" Appointment Date:     Referred Elsewhere? No  • If “Yes” - (Where? Ex: [facility], MATTHEW/MAT, [address] [place] [address], etc )       Name of Insurance Co: [facility] [address] ID# [id number]  Insurance Phone # [phone]  If ins is primary or secondary? Primary  If patient is a minor, parents information such as Name, D  O B of guarantor    "

## 2023-06-20 ENCOUNTER — ANESTHESIA EVENT (OUTPATIENT)
Dept: ANESTHESIOLOGY | Facility: HOSPITAL | Age: 78
End: 2023-06-20

## 2023-06-20 ENCOUNTER — ANESTHESIA (OUTPATIENT)
Dept: ANESTHESIOLOGY | Facility: HOSPITAL | Age: 78
End: 2023-06-20

## 2023-06-21 ENCOUNTER — HOSPITAL ENCOUNTER (OUTPATIENT)
Facility: HOSPITAL | Age: 78
Setting detail: OUTPATIENT SURGERY
Discharge: HOME | End: 2023-06-22
Attending: OTOLARYNGOLOGY | Admitting: OTOLARYNGOLOGY
Payer: COMMERCIAL

## 2023-06-21 ENCOUNTER — ANESTHESIA (OUTPATIENT)
Dept: PERIOP | Facility: HOSPITAL | Age: 78
End: 2023-06-21
Payer: COMMERCIAL

## 2023-06-21 ENCOUNTER — ANESTHESIA EVENT (OUTPATIENT)
Dept: PERIOP | Facility: HOSPITAL | Age: 78
End: 2023-06-21
Payer: COMMERCIAL

## 2023-06-21 DIAGNOSIS — E07.9 THYROID MASS: ICD-10-CM

## 2023-06-21 DIAGNOSIS — E04.9 SUBSTERNAL GOITER: ICD-10-CM

## 2023-06-21 DIAGNOSIS — E04.2 NONTOXIC MULTINODULAR GOITER: ICD-10-CM

## 2023-06-21 LAB
ABO GROUP BLD: NORMAL
BLD GP AB SCN SERPL QL: NEGATIVE
GLUCOSE SERPL-MCNC: 118 MG/DL (ref 65–140)
GLUCOSE SERPL-MCNC: 124 MG/DL (ref 65–140)
GLUCOSE SERPL-MCNC: 195 MG/DL (ref 65–140)
RH BLD: POSITIVE
SPECIMEN EXPIRATION DATE: NORMAL

## 2023-06-21 PROCEDURE — 86850 RBC ANTIBODY SCREEN: CPT | Performed by: ANESTHESIOLOGY

## 2023-06-21 PROCEDURE — 86901 BLOOD TYPING SEROLOGIC RH(D): CPT | Performed by: ANESTHESIOLOGY

## 2023-06-21 PROCEDURE — 88307 TISSUE EXAM BY PATHOLOGIST: CPT | Performed by: PATHOLOGY

## 2023-06-21 PROCEDURE — 86900 BLOOD TYPING SEROLOGIC ABO: CPT | Performed by: ANESTHESIOLOGY

## 2023-06-21 PROCEDURE — 60220 PARTIAL REMOVAL OF THYROID: CPT | Performed by: OTOLARYNGOLOGY

## 2023-06-21 PROCEDURE — 82948 REAGENT STRIP/BLOOD GLUCOSE: CPT

## 2023-06-21 RX ORDER — DEXAMETHASONE SODIUM PHOSPHATE 10 MG/ML
INJECTION, SOLUTION INTRAMUSCULAR; INTRAVENOUS AS NEEDED
Status: DISCONTINUED | OUTPATIENT
Start: 2023-06-21 | End: 2023-06-21

## 2023-06-21 RX ORDER — ACETAMINOPHEN 325 MG/1
650 TABLET ORAL EVERY 6 HOURS SCHEDULED
Status: DISCONTINUED | OUTPATIENT
Start: 2023-06-22 | End: 2023-06-22 | Stop reason: HOSPADM

## 2023-06-21 RX ORDER — PROPOFOL 10 MG/ML
INJECTION, EMULSION INTRAVENOUS CONTINUOUS PRN
Status: DISCONTINUED | OUTPATIENT
Start: 2023-06-21 | End: 2023-06-21

## 2023-06-21 RX ORDER — OXYCODONE HYDROCHLORIDE 5 MG/1
5 TABLET ORAL EVERY 4 HOURS PRN
Status: DISCONTINUED | OUTPATIENT
Start: 2023-06-21 | End: 2023-06-22 | Stop reason: HOSPADM

## 2023-06-21 RX ORDER — GLYCOPYRROLATE 0.2 MG/ML
INJECTION INTRAMUSCULAR; INTRAVENOUS AS NEEDED
Status: DISCONTINUED | OUTPATIENT
Start: 2023-06-21 | End: 2023-06-21

## 2023-06-21 RX ORDER — ACETAMINOPHEN 325 MG/1
650 TABLET ORAL EVERY 6 HOURS PRN
Status: DISCONTINUED | OUTPATIENT
Start: 2023-06-21 | End: 2023-06-21

## 2023-06-21 RX ORDER — FENTANYL CITRATE 50 UG/ML
INJECTION, SOLUTION INTRAMUSCULAR; INTRAVENOUS AS NEEDED
Status: DISCONTINUED | OUTPATIENT
Start: 2023-06-21 | End: 2023-06-21

## 2023-06-21 RX ORDER — EPHEDRINE SULFATE 50 MG/ML
INJECTION INTRAVENOUS AS NEEDED
Status: DISCONTINUED | OUTPATIENT
Start: 2023-06-21 | End: 2023-06-21

## 2023-06-21 RX ORDER — LIDOCAINE HYDROCHLORIDE 10 MG/ML
INJECTION, SOLUTION EPIDURAL; INFILTRATION; INTRACAUDAL; PERINEURAL AS NEEDED
Status: DISCONTINUED | OUTPATIENT
Start: 2023-06-21 | End: 2023-06-21

## 2023-06-21 RX ORDER — AMLODIPINE BESYLATE 10 MG/1
10 TABLET ORAL DAILY
Status: DISCONTINUED | OUTPATIENT
Start: 2023-06-22 | End: 2023-06-22 | Stop reason: HOSPADM

## 2023-06-21 RX ORDER — CEFAZOLIN SODIUM 1 G/3ML
INJECTION, POWDER, FOR SOLUTION INTRAMUSCULAR; INTRAVENOUS AS NEEDED
Status: DISCONTINUED | OUTPATIENT
Start: 2023-06-21 | End: 2023-06-21

## 2023-06-21 RX ORDER — IBUPROFEN 600 MG/1
600 TABLET ORAL EVERY 6 HOURS SCHEDULED
Status: DISCONTINUED | OUTPATIENT
Start: 2023-06-22 | End: 2023-06-22 | Stop reason: HOSPADM

## 2023-06-21 RX ORDER — SODIUM CHLORIDE 9 MG/ML
INJECTION, SOLUTION INTRAVENOUS CONTINUOUS PRN
Status: DISCONTINUED | OUTPATIENT
Start: 2023-06-21 | End: 2023-06-21

## 2023-06-21 RX ORDER — LIDOCAINE HYDROCHLORIDE AND EPINEPHRINE 10; 10 MG/ML; UG/ML
INJECTION, SOLUTION INFILTRATION; PERINEURAL AS NEEDED
Status: DISCONTINUED | OUTPATIENT
Start: 2023-06-21 | End: 2023-06-21 | Stop reason: HOSPADM

## 2023-06-21 RX ORDER — HYDROMORPHONE HCL/PF 1 MG/ML
0.5 SYRINGE (ML) INJECTION EVERY 6 HOURS PRN
Status: DISCONTINUED | OUTPATIENT
Start: 2023-06-21 | End: 2023-06-22 | Stop reason: HOSPADM

## 2023-06-21 RX ORDER — OXYCODONE HYDROCHLORIDE 10 MG/1
10 TABLET ORAL EVERY 4 HOURS PRN
Status: DISCONTINUED | OUTPATIENT
Start: 2023-06-21 | End: 2023-06-22 | Stop reason: HOSPADM

## 2023-06-21 RX ORDER — IBUPROFEN 400 MG/1
800 TABLET ORAL EVERY 6 HOURS PRN
Status: DISCONTINUED | OUTPATIENT
Start: 2023-06-21 | End: 2023-06-21

## 2023-06-21 RX ORDER — FENTANYL CITRATE/PF 50 MCG/ML
25 SYRINGE (ML) INJECTION
Status: DISCONTINUED | OUTPATIENT
Start: 2023-06-21 | End: 2023-06-21 | Stop reason: HOSPADM

## 2023-06-21 RX ORDER — ONDANSETRON 2 MG/ML
INJECTION INTRAMUSCULAR; INTRAVENOUS AS NEEDED
Status: DISCONTINUED | OUTPATIENT
Start: 2023-06-21 | End: 2023-06-21

## 2023-06-21 RX ORDER — MAGNESIUM HYDROXIDE 1200 MG/15ML
LIQUID ORAL AS NEEDED
Status: DISCONTINUED | OUTPATIENT
Start: 2023-06-21 | End: 2023-06-21 | Stop reason: HOSPADM

## 2023-06-21 RX ORDER — ONDANSETRON 2 MG/ML
4 INJECTION INTRAMUSCULAR; INTRAVENOUS ONCE AS NEEDED
Status: DISCONTINUED | OUTPATIENT
Start: 2023-06-21 | End: 2023-06-21 | Stop reason: HOSPADM

## 2023-06-21 RX ORDER — SUCCINYLCHOLINE/SOD CL,ISO/PF 100 MG/5ML
SYRINGE (ML) INTRAVENOUS AS NEEDED
Status: DISCONTINUED | OUTPATIENT
Start: 2023-06-21 | End: 2023-06-21

## 2023-06-21 RX ORDER — LEVOTHYROXINE SODIUM 0.03 MG/1
25 TABLET ORAL
Status: DISCONTINUED | OUTPATIENT
Start: 2023-06-22 | End: 2023-06-22 | Stop reason: HOSPADM

## 2023-06-21 RX ORDER — SODIUM CHLORIDE, SODIUM LACTATE, POTASSIUM CHLORIDE, CALCIUM CHLORIDE 600; 310; 30; 20 MG/100ML; MG/100ML; MG/100ML; MG/100ML
INJECTION, SOLUTION INTRAVENOUS CONTINUOUS PRN
Status: DISCONTINUED | OUTPATIENT
Start: 2023-06-21 | End: 2023-06-21

## 2023-06-21 RX ORDER — PAROXETINE HYDROCHLORIDE 20 MG/1
20 TABLET, FILM COATED ORAL DAILY
Status: DISCONTINUED | OUTPATIENT
Start: 2023-06-22 | End: 2023-06-22 | Stop reason: HOSPADM

## 2023-06-21 RX ORDER — OXYCODONE HCL 5 MG/5 ML
5 SOLUTION, ORAL ORAL EVERY 4 HOURS PRN
Status: DISCONTINUED | OUTPATIENT
Start: 2023-06-21 | End: 2023-06-21

## 2023-06-21 RX ORDER — INSULIN LISPRO 100 [IU]/ML
1-5 INJECTION, SOLUTION INTRAVENOUS; SUBCUTANEOUS
Status: DISCONTINUED | OUTPATIENT
Start: 2023-06-22 | End: 2023-06-22 | Stop reason: HOSPADM

## 2023-06-21 RX ORDER — ONDANSETRON 2 MG/ML
4 INJECTION INTRAMUSCULAR; INTRAVENOUS EVERY 6 HOURS PRN
Status: DISCONTINUED | OUTPATIENT
Start: 2023-06-21 | End: 2023-06-22 | Stop reason: HOSPADM

## 2023-06-21 RX ADMIN — FENTANYL CITRATE 50 MCG: 50 INJECTION, SOLUTION INTRAMUSCULAR; INTRAVENOUS at 17:21

## 2023-06-21 RX ADMIN — EPHEDRINE SULFATE 10 MG: 50 INJECTION INTRAVENOUS at 17:39

## 2023-06-21 RX ADMIN — Medication 100 MG: at 15:35

## 2023-06-21 RX ADMIN — EPHEDRINE SULFATE 10 MG: 50 INJECTION INTRAVENOUS at 17:28

## 2023-06-21 RX ADMIN — GLYCOPYRROLATE 0.2 MG: 0.2 INJECTION, SOLUTION INTRAMUSCULAR; INTRAVENOUS at 15:43

## 2023-06-21 RX ADMIN — IBUPROFEN 800 MG: 400 TABLET, FILM COATED ORAL at 20:05

## 2023-06-21 RX ADMIN — SODIUM CHLORIDE: 0.9 INJECTION, SOLUTION INTRAVENOUS at 15:41

## 2023-06-21 RX ADMIN — REMIFENTANIL HYDROCHLORIDE 0.2 MCG/KG/MIN: 1 INJECTION, POWDER, LYOPHILIZED, FOR SOLUTION INTRAVENOUS at 15:46

## 2023-06-21 RX ADMIN — LIDOCAINE HYDROCHLORIDE 50 MG: 10 INJECTION, SOLUTION EPIDURAL; INFILTRATION; INTRACAUDAL; PERINEURAL at 15:31

## 2023-06-21 RX ADMIN — DEXAMETHASONE SODIUM PHOSPHATE 10 MG: 10 INJECTION, SOLUTION INTRAMUSCULAR; INTRAVENOUS at 17:21

## 2023-06-21 RX ADMIN — PHENYLEPHRINE HYDROCHLORIDE 20 MCG/MIN: 10 INJECTION INTRAVENOUS at 16:00

## 2023-06-21 RX ADMIN — FENTANYL CITRATE 50 MCG: 50 INJECTION, SOLUTION INTRAMUSCULAR; INTRAVENOUS at 15:46

## 2023-06-21 RX ADMIN — SODIUM CHLORIDE, SODIUM LACTATE, POTASSIUM CHLORIDE, AND CALCIUM CHLORIDE: .6; .31; .03; .02 INJECTION, SOLUTION INTRAVENOUS at 15:04

## 2023-06-21 RX ADMIN — METFORMIN HYDROCHLORIDE 1000 MG: 500 TABLET, FILM COATED ORAL at 20:27

## 2023-06-21 RX ADMIN — CEFAZOLIN 1000 MG: 1 INJECTION, POWDER, FOR SOLUTION INTRAMUSCULAR; INTRAVENOUS at 16:01

## 2023-06-21 RX ADMIN — ONDANSETRON 4 MG: 2 INJECTION INTRAMUSCULAR; INTRAVENOUS at 17:21

## 2023-06-21 RX ADMIN — OXYCODONE HYDROCHLORIDE 10 MG: 10 TABLET ORAL at 22:13

## 2023-06-21 RX ADMIN — PROPOFOL 150 MCG/KG/MIN: 10 INJECTION, EMULSION INTRAVENOUS at 15:46

## 2023-06-21 NOTE — ANESTHESIA POSTPROCEDURE EVALUATION
Post-Op Assessment Note    CV Status:  Stable  Pain Score: 0    Pain management: adequate     Mental Status:  Alert and awake   Hydration Status:  Euvolemic   PONV Controlled:  Controlled   Airway Patency:  Patent      Post Op Vitals Reviewed: Yes      Staff: CRNA         No notable events documented      BP      Temp 97 7 °F (36 5 °C) (06/21/23 1813)    Pulse 64 (06/21/23 1813)   Resp 16 (06/21/23 1813)    SpO2 99 % (06/21/23 1813)

## 2023-06-21 NOTE — ANESTHESIA PROCEDURE NOTES
"Arterial Line Insertion    Performed by: Cristine Krabbe, MD  Authorized by: Cristine Krabbe, MD  Consent: Verbal consent obtained  Risks and benefits: risks, benefits and alternatives were discussed  Consent given by: patient  Required items: required blood products, implants, devices, and special equipment available  Patient identity confirmed: arm band  Time out: Immediately prior to procedure a \"time out\" was called to verify the correct patient, procedure, equipment, support staff and site/side marked as required  Preparation: Patient was prepped and draped in the usual sterile fashion    Indications: hemodynamic monitoring  Orientation:  Right  Location: radial artery  Sedation:  Patient sedated: GA     Procedure Details:  Needle gauge: 20  Number of attempts: 1    Post-procedure:  Post-procedure: dressing applied  Waveform: good waveform          "

## 2023-06-21 NOTE — OP NOTE
OPERATIVE REPORT  PATIENT NAME: Iwona Craig    :  1945  MRN: [id number]  Pt Location: BE OR ROOM 05    SURGERY DATE: 2023    Surgeon(s) and Role:     * Jennifer Monaco MD - Primary     * Rhys Osborn MD - Assisting    Preop Diagnosis:  Thyroid mass [E07 9]  Substernal goiter [E04 9]  Nontoxic multinodular goiter [E04 2]    Post-Op Diagnosis Codes: * Thyroid mass [E07 9]     * Substernal goiter [E04 9]     * Nontoxic multinodular goiter [E04 2]    Procedure(s):  LEFT HEMITHYROIDECTOMY, SUBSTERNAL MODIFIER 22     Specimen(s):  ID Type Source Tests Collected by Time Destination   1 : Left hemithyroidectomy Tissue Thyroid, Left TISSUE EXAM Rhys Osborn MD 2023  5:08 PM        Estimated Blood Loss:   Minimal    Drains:  Closed/Suction Drain Anterior; Left Neck Bulb (Active)   Number of days: 0       Anesthesia Type:   General    Operative Indications:  Thyroid mass [E07 9]  Substernal goiter [E04 9]  Nontoxic multinodular goiter [E04 2]    Operative Findings:  Massive 10cm left sided dominate goiter with significant substernal extent down towards the aortic arch, removed en total  Right ariana thyroid left as below  Both parathyroids seen  L RLN identified and simulated at the end of the case  10 flat CELIA placed  Modifier 22 applied for increased operative difficulty and time given the massive goiter, with distortion of normal tissue planes, substernal extent, and necessity for division of the strap muscles  Decision was made to leave the Right thyroid gland in-situ  This was discussed as a possibility with patient and family pre-op after reviewing that the majority of her thyroid enlargement was on the Left and that the right thyroid in the neck was relatively normal in size with its substernal component being inaccessible through the neck  This was confirmed intraoperatively      Complications:   None    Procedure and Technique:  Patient brought to the operating room and placed onto the operating table in supine position  Timeout performed  Patient placed under general endotracheal anesthesia with NIM tube  A shoulder roll was placed  The NIM system was set up, calibrated, and tested  The planned midline horizontal neck incision was marked in a RSTL  Lidocaine with epinephrine was injected into the planned incision  Patient was prepped and draped in usual sterile fashion  15 blade was used to incise the skin down through the subcutaneous tissue  A bovie was used to deepen the incision through the platysma muscle  A superior subplatysmal flap was elevated to the thyroid notch  An inferior subplatysmal flap was elevated to the level of the sternal notch and clavicles  Laterally, the dissection was carried to the anterior edge of the SCM bilaterally  A self-retaining retractor was inserted  Using Debakey forceps and bovie, the midline tissues overlying the strap muscles were divided vertically  The sternohyoid muscle was identified as well as the midline raphe  This raphe was then divided in the midline vertically  The thyroid gland was now visualized  Attention first turned to the left lobe  A Upton retractor was inserted under the sternohyoid and sternothyroid muscles  Poor visualization was seen here  Decision was made at this point to divide the straps muscles for better visualization and blunt dissection was used to separate the thyroid lobe from the lateral and underlying tissues so that the gland could be rotated medially  The gland was then retracted inferiorly and anteriorly  Blunt dissection was used to separate the medial aspect of the superior pole from the trachea  Blunt dissection was used to free the superior extent of the superior pole  The superior parathyroid gland was identified and  from the thyroid, taking care to preserve its vascular pedicle  The superior pole vessels were identified and ligated   We then turned our attention to the inferior extent  Very careful blunt dissection was used to deliver the inferior/substernal extent out of the chest into the neck  Next, the thyroid was rotated more anteromedialy and dissection occurred along the mid-inferior portion of the thyroid, freeing the thin fascial cover overlying the thyroid  This was reflected laterally  Dissection then occurred within the triangle of carotid, thyroid, and trachea where the recurrent laryngeal nerve was identified  The nerve was then followed superiorly to where it entered the larynx  The inferior parathyroid gland was identified and  from the thyroid, taking care to preserve its vascular pedicle  All other tissue anterior to the nerve was then divided with the nerve under direct visualization  This included identification and ligation of the middle thyroid vein and inferior pole vessels  This now left the thyroid lobe tethered to the trachea by Berry's ligament  The lobe was dissected medially using the bipolar and curved scissors, followed by the bovie placed at a 45 degree angle to the trachea once dissection was far from the nerve  The freed specimen was inspected and passed off the field  The wound was irrigated, and hemostasis was obtained with electrocautery  A Valsalva maneuver was performed, and no further bleeding was noted  Surgicel was placed in the wound  A closed suction drain was placed  The strap muscles were approximated with 3-0 vicryl in simple interrupted fashion  The platysma and deep dermis were approximated with 3-0 vicryl in simple interrupted fashion  The skin was closed with 4-0 monocryl in running subcuticular fashion  Mastisol was applied to wound edges, and steristrips were placed  The patient was taken out of general anesthesia and transferred to the PACU in stable condition  I was present for the entire procedure , A qualified resident physician was not available   and A co-surgeon was required because of skills and techniques relevant to speciality  Patient Disposition:  PACU         SIGNATURE: Jessica Suarez MD  DATE: June 21, 2023  TIME: 5:43 PM

## 2023-06-22 VITALS
WEIGHT: 140 LBS | OXYGEN SATURATION: 93 % | RESPIRATION RATE: 18 BRPM | HEIGHT: 65 IN | HEART RATE: 49 BPM | DIASTOLIC BLOOD PRESSURE: 58 MMHG | SYSTOLIC BLOOD PRESSURE: 129 MMHG | TEMPERATURE: 97.8 F | BODY MASS INDEX: 23.32 KG/M2

## 2023-06-22 LAB — GLUCOSE SERPL-MCNC: 216 MG/DL (ref 65–140)

## 2023-06-22 PROCEDURE — 82948 REAGENT STRIP/BLOOD GLUCOSE: CPT

## 2023-06-22 RX ADMIN — LEVOTHYROXINE SODIUM 25 MCG: 25 TABLET ORAL at 05:32

## 2023-06-22 RX ADMIN — ACETAMINOPHEN 650 MG: 325 TABLET, FILM COATED ORAL at 05:32

## 2023-06-22 RX ADMIN — ACETAMINOPHEN 650 MG: 325 TABLET, FILM COATED ORAL at 01:02

## 2023-06-22 NOTE — PROGRESS NOTES
"OTOLARYNGOLOGY PROGRESS NOTE    Date of Service: 6/22/2023 6:44 AM    HPI  Patient is a 69 y o  female who presented with substernal goiter s/p hemithyroidectomy 6/22  She was experiencing compressive symptoms prior to removal including SOB  SOB has improved significantly  Overnight Events:     No acute events overnight  Mild pain around incision CDI  Drain L neck: 135ml    PHYSICAL EXAM:  Vitals:    06/21/23 2310   BP: 129/57   Pulse: 55   Resp:    Temp: 97 6 °F (36 4 °C)   SpO2: 95%        General: No acute distress, AOx4  HEENT: Incision on anterior neck with Steris in place, drain from left neck draining SS 135ml  Neurology: No focal deficits  Lungs: Breathing easy, unlabored and even on room air  Cardio: RRR, well perfused  Abd: soft, NT, ND        Intake/Output Summary (Last 24 hours) at 6/22/2023 0644  Last data filed at 6/21/2023 2300  Gross per 24 hour   Intake 1800 ml   Output 585 ml   Net 1215 ml         LABORATORY    No results for input(s): \"WBC\", \"HGB\", \"HCT\", \"PLT\" in the last 72 hours  No results for input(s): \"NA\", \"K\", \"CL\", \"BUN\", \"CREAT\", \"PHOS\", \"MG\" in the last 72 hours  Invalid input(s): \"TCO2\", \"GLU\", \"CA\", \"CAIONIZ\"    Invalid input(s): \"PTPAT\", \"PTINR\", \"APTTMNNM\", \"APTTPAT\"      Patient Active Problem List   Diagnosis   • Essential hypertension   • Type 2 diabetes mellitus without complication, without long-term current use of insulin ([address] )   • Acquired hypothyroidism   • Generalized anxiety disorder   • Lymphedema of left arm   • History of breast cancer   • Recurrent depression ([address] )   • Substernal goiter         ASSESSMENT  Patient is a 69 y o  female w/ acute and chronic problems as above, who presents s/p lweft hemithyroidectomy for substernal goiter       PLAN  - maintain drain, will remove outpatient  - d/c planning for today  - Follow-up with Dr Rufus ECHAVARRIA on Friday for drain removal     "

## 2023-06-22 NOTE — DISCHARGE INSTR - AVS FIRST PAGE
MARTIN Saavedra  Post-Operative Instructions  Office [phone]       - Keep wound clean and dry  May apply dressing as needed but generally leave open to air  - Okay to shower but do not submerge in water for 2 weeks, pat incision dry (don't rub)  - Avoid lifting anything greater than 10 lbs (a gallon of milk) for 3 weeks  - Activity and diet as tolerated  - May use tylenol or Advil for pain  - Follow up with Dr Adrián Gaffney in 1 weeks  - Drain removal on Friday  - Watch for signs of fever, chills, warmth, redness, or drainage  A slight amount is normal for a day or two following surgery  Seek medical attention for: fever >101 5 F, increasing warmth, reddness, swelling, bleeding, or anything that may concern you  - You will be discharged with a drain in place  Prior to discharge the nursing team will speak to you about drain care  Please record morning afternoon and evening drain output  If there is any large change or irritation around drain site, an increase in output volume, consistency, or color this is an indication to call your surgical team  Hailey Braga will be provided with instructions to remove the drain at home or to have it removed at your follow up  How to contact us:    Phone: If you have questions or concerns, please call us at [phone] during business hours (8 am to 5 pm)  On nights and weekends, you may page the ENT surgeon on call  at [facility]   In case of emergency, please call [phone]

## 2023-06-22 NOTE — PLAN OF CARE
Problem: MOBILITY - ADULT  Goal: Maintain or return to baseline ADL function  Description: INTERVENTIONS:  -  Assess patient's ability to carry out ADLs; assess patient's baseline for ADL function and identify physical deficits which impact ability to perform ADLs (bathing, care of mouth/teeth, toileting, grooming, dressing, etc )  - Assess/evaluate cause of self-care deficits   - Assess range of motion  - Assess patient's mobility; develop plan if impaired  - Assess patient's need for assistive devices and provide as appropriate  - Encourage maximum independence but intervene and supervise when necessary  - Involve family in performance of ADLs  - Assess for home care needs following discharge   - Consider OT consult to assist with ADL evaluation and planning for discharge  - Provide patient education as appropriate  Outcome: Progressing     Problem: PAIN - ADULT  Goal: Verbalizes/displays adequate comfort level or baseline comfort level  Description: Interventions:  - Encourage patient to monitor pain and request assistance  - Assess pain using appropriate pain scale  - Administer analgesics based on type and severity of pain and evaluate response  - Implement non-pharmacological measures as appropriate and evaluate response  - Consider cultural and social influences on pain and pain management  - Notify physician/advanced practitioner if interventions unsuccessful or patient reports new pain  Outcome: Progressing     Problem: INFECTION - ADULT  Goal: Absence or prevention of progression during hospitalization  Description: INTERVENTIONS:  - Assess and monitor for signs and symptoms of infection  - Monitor lab/diagnostic results  - Monitor all insertion sites, i e  indwelling lines, tubes, and drains  - Monitor endotracheal if appropriate and nasal secretions for changes in amount and color  - [place] appropriate cooling/warming therapies per order  - Administer medications as ordered  - Instruct and encourage patient and family to use good hand hygiene technique  - Identify and instruct in appropriate isolation precautions for identified infection/condition  Outcome: Progressing     Problem: SAFETY ADULT  Goal: Maintain or return to baseline ADL function  Description: INTERVENTIONS:  -  Assess patient's ability to carry out ADLs; assess patient's baseline for ADL function and identify physical deficits which impact ability to perform ADLs (bathing, care of mouth/teeth, toileting, grooming, dressing, etc )  - Assess/evaluate cause of self-care deficits   - Assess range of motion  - Assess patient's mobility; develop plan if impaired  - Assess patient's need for assistive devices and provide as appropriate  - Encourage maximum independence but intervene and supervise when necessary  - Involve family in performance of ADLs  - Assess for home care needs following discharge   - Consider OT consult to assist with ADL evaluation and planning for discharge  - Provide patient education as appropriate  Outcome: Progressing

## 2023-06-26 NOTE — ANESTHESIA PREPROCEDURE EVALUATION
Procedure:  LEFT HEMITHYROIDECTOMY, SUBSTERNAL (Neck)    Relevant Problems   CARDIO   (+) Essential hypertension      ENDO   (+) Acquired hypothyroidism   (+) Type 2 diabetes mellitus without complication, without long-term current use of insulin (HCC)      NEURO/PSYCH   (+) Generalized anxiety disorder   (+) Recurrent depression (HCC)        Physical Exam    Airway    Mallampati score: II  TM Distance: >3 FB  Neck ROM: full     Dental       Cardiovascular      Pulmonary      Other Findings        Anesthesia Plan  ASA Score- 3     Anesthesia Type- general with ASA Monitors  Additional Monitors: arterial line  Airway Plan: ETT  Comment: Large goiter  Patient tolerate laying flat without SOB  Plan attempt IV/inhalational induction with spontaneous breathing and paralytic as backup plan          Plan Factors-Exercise tolerance (METS): >4 METS  Chart reviewed  Induction- intravenous and inhalational     Postoperative Plan- Plan for postoperative opioid use  Planned trial extubation    Informed Consent- Anesthetic plan and risks discussed with patient  I personally reviewed this patient with the CRNA  Discussed and agreed on the Anesthesia Plan with the CRNA  Mary Jane Pastor

## 2023-06-28 PROCEDURE — 88307 TISSUE EXAM BY PATHOLOGIST: CPT | Performed by: PATHOLOGY

## 2023-06-29 PROBLEM — C73 PAPILLARY THYROID CARCINOMA (HCC): Status: ACTIVE | Noted: 2023-06-29

## 2023-06-29 PROBLEM — E07.9 THYROID MASS: Status: ACTIVE | Noted: 2023-06-29

## 2023-07-05 DIAGNOSIS — I10 ESSENTIAL HYPERTENSION: ICD-10-CM

## 2023-07-05 RX ORDER — AMLODIPINE BESYLATE 10 MG/1
TABLET ORAL
Qty: 90 TABLET | Refills: 1 | Status: SHIPPED | OUTPATIENT
Start: 2023-07-05

## 2023-08-21 ENCOUNTER — APPOINTMENT (OUTPATIENT)
Dept: LAB | Facility: CLINIC | Age: 78
End: 2023-08-21
Payer: COMMERCIAL

## 2023-08-21 DIAGNOSIS — C73 PAPILLARY THYROID CARCINOMA (HCC): ICD-10-CM

## 2023-08-21 DIAGNOSIS — E07.9 THYROID MASS: ICD-10-CM

## 2023-08-21 LAB — TSH SERPL DL<=0.05 MIU/L-ACNC: 1.5 UIU/ML (ref 0.45–4.5)

## 2023-08-21 PROCEDURE — 36415 COLL VENOUS BLD VENIPUNCTURE: CPT

## 2023-08-21 PROCEDURE — 84443 ASSAY THYROID STIM HORMONE: CPT

## 2023-09-05 ENCOUNTER — OFFICE VISIT (OUTPATIENT)
Dept: FAMILY MEDICINE CLINIC | Facility: CLINIC | Age: 78
End: 2023-09-05
Payer: COMMERCIAL

## 2023-09-05 VITALS
TEMPERATURE: 97.9 F | DIASTOLIC BLOOD PRESSURE: 64 MMHG | BODY MASS INDEX: 22.86 KG/M2 | HEART RATE: 57 BPM | HEIGHT: 65 IN | WEIGHT: 137.2 LBS | RESPIRATION RATE: 16 BRPM | SYSTOLIC BLOOD PRESSURE: 142 MMHG

## 2023-09-05 DIAGNOSIS — F41.1 GENERALIZED ANXIETY DISORDER: ICD-10-CM

## 2023-09-05 DIAGNOSIS — E03.9 ACQUIRED HYPOTHYROIDISM: ICD-10-CM

## 2023-09-05 DIAGNOSIS — E11.9 TYPE 2 DIABETES MELLITUS WITHOUT COMPLICATION, WITHOUT LONG-TERM CURRENT USE OF INSULIN (HCC): Primary | ICD-10-CM

## 2023-09-05 DIAGNOSIS — I10 ESSENTIAL HYPERTENSION: ICD-10-CM

## 2023-09-05 PROCEDURE — 99214 OFFICE O/P EST MOD 30 MIN: CPT | Performed by: FAMILY MEDICINE

## 2023-09-05 RX ORDER — PAROXETINE HYDROCHLORIDE 20 MG/1
20 TABLET, FILM COATED ORAL DAILY
Qty: 90 TABLET | Refills: 1 | Status: SHIPPED | OUTPATIENT
Start: 2023-09-05

## 2023-09-05 NOTE — PROGRESS NOTES
Name: Melina Nguyen      : 1945      MRN: [id number]  Encounter Provider: Alma Mason DO  Encounter Date: 2023   [facility]    Assessment & Plan     1. Type 2 diabetes mellitus without complication, without long-term current use of insulin (HCC)  Assessment & Plan:    Lab Results   Component Value Date    HGBA1C 6.2 (H) 2023   Has been well controlled      Orders:  -     Hemoglobin A1C; Future  -     Hemoglobin A1C; Future; Expected date: 2024  -     Albumin / creatinine urine ratio; Future; Expected date: 2024    2. Acquired hypothyroidism  Assessment & Plan:  TSH is at goal  Patient is feeling better since her thyroid surgery    Orders:  -     TSH, 3rd generation; Future; Expected date: 2024  -     T4, free; Future; Expected date: 2024    3. Essential hypertension  Assessment & Plan:  Well-controlled  Continue amlodipine 10 mg daily    Orders:  -     CBC; Future  -     Comprehensive metabolic panel; Future  -     Lipid Panel with Direct LDL reflex; Future  -     CBC; Future; Expected date: 2024  -     Comprehensive metabolic panel; Future; Expected date: 2024  -     Lipid Panel with Direct LDL reflex; Future; Expected date: 2024    4. Generalized anxiety disorder  Assessment & Plan: On paroxetine  Currently on a waiting list for a therapist    Orders:  -     PARoxetine (PAXIL) 20 mg tablet; Take 1 tablet (20 mg total) by mouth daily        Return in about 6 months (around 3/5/2024) for Annual Wellness Visit. Subjective      Patient reports that she is feeling better since her thyroid surgery. She is still getting her strength back. She is able to eat better. She still has issues with her mood and is waiting for a therapist.  They did reach out to her and they said they to get back to her in September.     Review of Systems    Current Outpatient Medications on File Prior to Visit   Medication Sig   • amLODIPine (NORVASC) 10 mg tablet TAKE 1 TABLET BY MOUTH EVERY DAY   • levothyroxine 25 mcg tablet Take 1 tablet (25 mcg total) by mouth daily   • metFORMIN (GLUCOPHAGE) 1000 MG tablet Take 1 tablet (1,000 mg total) by mouth 2 (two) times a day with meals   • Multiple Vitamin (multivitamin) capsule Take 1 capsule by mouth daily   • [DISCONTINUED] PARoxetine (PAXIL) 20 mg tablet Take 1 tablet (20 mg total) by mouth daily       Objective     /64   Pulse 57   Temp 97.9 °F (36.6 °C)   Resp 16   Ht 5' 5" (1.651 m)   Wt 62.2 kg (137 lb 3.2 oz)   BMI 22.83 kg/m²     Physical Exam  Vitals and nursing note reviewed. Constitutional:       Appearance: She is well-developed. HENT:      Head: Normocephalic and atraumatic. Right Ear: Tympanic membrane and external ear normal.      Left Ear: Tympanic membrane and external ear normal.   Cardiovascular:      Rate and Rhythm: Normal rate and regular rhythm. Pulses: no weak pulses          Dorsalis pedis pulses are 2+ on the right side and 2+ on the left side. Posterior tibial pulses are 2+ on the right side and 2+ on the left side. Heart sounds: Normal heart sounds. No murmur heard. No friction rub. Pulmonary:      Effort: Pulmonary effort is normal. No respiratory distress. Breath sounds: Normal breath sounds. No wheezing or rales. Musculoskeletal:      Right lower leg: No edema. Left lower leg: No edema. Feet:      Right foot:      Skin integrity: No ulcer, skin breakdown, erythema, warmth, callus or dry skin. Left foot:      Skin integrity: No ulcer, skin breakdown, erythema, warmth, callus or dry skin. Patient's shoes and socks removed. Right Foot/Ankle   Right Foot Inspection  Skin Exam: skin normal. Skin not intact, no dry skin, no warmth, no callus, no erythema, no maceration, no abnormal color, no pre-ulcer, no ulcer and no callus. Toe Exam: ROM and strength within normal limits.      Sensory Monofilament testing: intact    Vascular  Capillary refills: < 3 seconds  The right DP pulse is 2+. The right PT pulse is 2+. Left Foot/Ankle  Left Foot Inspection  Skin Exam: skin normal. Skin not intact, no dry skin, no warmth, no erythema, no maceration, normal color, no pre-ulcer, no ulcer and no callus. Toe Exam: ROM and strength within normal limits. Sensory   Monofilament testing: intact    Vascular  Capillary refills: < 3 seconds  The left DP pulse is 2+. The left PT pulse is 2+.      Assign Risk Category  No deformity present  No loss of protective sensation  No weak pulses  Risk: 0    General Alba, DO

## 2023-11-06 ENCOUNTER — TELEPHONE (OUTPATIENT)
Age: 78
End: 2023-11-06

## 2023-11-06 ENCOUNTER — OFFICE VISIT (OUTPATIENT)
Dept: FAMILY MEDICINE CLINIC | Facility: CLINIC | Age: 78
End: 2023-11-06
Payer: COMMERCIAL

## 2023-11-06 VITALS
HEART RATE: 56 BPM | WEIGHT: 137.6 LBS | HEIGHT: 65 IN | SYSTOLIC BLOOD PRESSURE: 152 MMHG | RESPIRATION RATE: 16 BRPM | OXYGEN SATURATION: 98 % | DIASTOLIC BLOOD PRESSURE: 70 MMHG | BODY MASS INDEX: 22.92 KG/M2 | TEMPERATURE: 97.2 F

## 2023-11-06 DIAGNOSIS — E11.9 TYPE 2 DIABETES MELLITUS WITHOUT COMPLICATION, WITHOUT LONG-TERM CURRENT USE OF INSULIN (HCC): ICD-10-CM

## 2023-11-06 DIAGNOSIS — R19.4 CHANGE IN BOWEL HABITS: Primary | ICD-10-CM

## 2023-11-06 DIAGNOSIS — F41.1 GENERALIZED ANXIETY DISORDER: ICD-10-CM

## 2023-11-06 DIAGNOSIS — E03.9 ACQUIRED HYPOTHYROIDISM: ICD-10-CM

## 2023-11-06 PROCEDURE — 99214 OFFICE O/P EST MOD 30 MIN: CPT | Performed by: NURSE PRACTITIONER

## 2023-11-06 RX ORDER — METFORMIN HYDROCHLORIDE 500 MG/1
1000 TABLET, EXTENDED RELEASE ORAL 2 TIMES DAILY WITH MEALS
Qty: 360 TABLET | Refills: 1 | Status: SHIPPED | OUTPATIENT
Start: 2023-11-06 | End: 2024-05-04

## 2023-11-06 RX ORDER — METFORMIN HYDROCHLORIDE 500 MG/1
1000 TABLET, EXTENDED RELEASE ORAL
Qty: 180 TABLET | Refills: 1 | Status: SHIPPED | OUTPATIENT
Start: 2023-11-06 | End: 2023-11-06 | Stop reason: SDUPTHER

## 2023-11-06 NOTE — TELEPHONE ENCOUNTER
Pharmacy calling regarding metformin. They stated that patient said should be on 4 tabs daily. Please rectify and resend to pharmacy.   Josee Ornelas

## 2023-11-06 NOTE — ADDENDUM NOTE
Addended by: Autumn Caputo on: 11/6/2023 02:49 PM     Modules accepted: Orders Azithromycin Pregnancy And Lactation Text: This medication is considered safe during pregnancy and is also secreted in breast milk.

## 2023-11-06 NOTE — PATIENT INSTRUCTIONS
Metformin (By mouth)   Metformin Hydrochloride (met-FOR-min sultana-droe-KLOR-rebecca)  Treats type 2 diabetes. Brand Name(s): DM2, Glucophage, Glucophage XR, Glumetza, Riomet   There may be other brand names for this medicine. When This Medicine Should Not Be Used: This medicine is not right for everyone. Do not use if you had an allergic reaction to metformin. How to Use This Medicine:   Liquid, Long Acting Suspension, Tablet, Long Acting Tablet, 24 Hour Tablet  Take your medicine as directed. Your dose may need to be changed several times to find what works best for you. It is best to take this medicine with food or milk. Swallow the extended-release tablet whole. Do not crush, break, or chew it. Tell your doctor if you have trouble swallowing the tablets whole. Oral liquid: Measure the oral liquid medicine with a marked measuring spoon, oral syringe, or medicine cup. Extended-release oral suspension: Use the supplied dosing cup to measure the mixed medicine. Ask your pharmacist for a dosing cup if you do not have one. Read and follow the patient instructions that come with this medicine. Talk to your doctor or pharmacist if you have any questions. Missed dose: Take a dose as soon as you remember. If it is almost time for your next dose, wait until then and take a regular dose. Do not take extra medicine to make up for a missed dose. Store the medicine in a closed container at room temperature, away from heat, moisture, and direct light. Drugs and Foods to Avoid:   Ask your doctor or pharmacist before using any other medicine, including over-the-counter medicines, vitamins, and herbal products. Some medicines can affect how metformin works.  Tell your doctor if you are using any of the following:  Acetazolamide, cimetidine, dichlorphenamide, dolutegravir, isoniazid, nicotinic acid, phenytoin, ranolazine, topiramate, vandetanib, zonisamide  Birth control pills  Blood pressure medicine  Diuretic (water pill)  Phenothiazine medicine  Steroid medicine  Thyroid medicine  Do not drink alcohol while you are using this medicine. Warnings While Using This Medicine:   Tell your doctor if you are pregnant or breastfeeding, or if you have kidney disease, liver disease, heart or blood vessel disease, heart failure, blood circulation problems, anemia, metabolic acidosis, an adrenal gland or pituitary gland disorder, vitamin B12 deficiency, or had a heart attack. Tell your doctor if you drink alcohol. Too much of this medicine can cause a rare, but serious condition called lactic acidosis. Part of the extended-release tablet may pass in your stool. This is normal.  Make sure any doctor or dentist who treats you knows that you are using this medicine. You may need to stop using this medicine before you have surgery, an x-ray, CT scan, or other medical test.  This medicine may cause some premenopausal women who do not have regular monthly periods to ovulate. This can increase the chance of pregnancy. If you are a woman of childbearing potential, discuss birth control options with your doctor. Your doctor will do lab tests at regular visits to check on the effects of this medicine. Keep all appointments. Keep all medicine out of the reach of children. Never share your medicine with anyone. Possible Side Effects While Using This Medicine:   Call your doctor right away if you notice any of these side effects:   Allergic reaction: Itching or hives, swelling in your face or hands, swelling or tingling in your mouth or throat, chest tightness, trouble breathing  Confusion, fast heartbeat, increased hunger, shakiness  Fever or chills  Stomach pain, nausea, vomiting, muscle pain or cramping  Trouble breathing, slow heartbeat, lightheadedness, dizziness  Unusual tiredness or weakness  If you notice these less serious side effects, talk with your doctor:   Diarrhea, gas  Metallic taste in mouth  If you notice other side effects that you think are caused by this medicine, tell your doctor. Call your doctor for medical advice about side effects. You may report side effects to FDA at 5-957-YAI-6013  © Copyright [facility] 2023 Information is for End User's use only and may not be sold, redistributed or otherwise used for commercial purposes. The above information is an  only. It is not intended as medical advice for individual conditions or treatments. Talk to your doctor, nurse or pharmacist before following any medical regimen to see if it is safe and effective for you.

## 2023-11-14 ENCOUNTER — TELEPHONE (OUTPATIENT)
Dept: FAMILY MEDICINE CLINIC | Facility: CLINIC | Age: 78
End: 2023-11-14

## 2023-11-14 DIAGNOSIS — R19.7 DIARRHEA, UNSPECIFIED TYPE: ICD-10-CM

## 2023-11-14 DIAGNOSIS — R19.4 CHANGE IN BOWEL HABITS: Primary | ICD-10-CM

## 2023-11-14 NOTE — TELEPHONE ENCOUNTER
Patient called to follow up on diarrhea. Stated that after switching metformin to XL, her diarrhea improved very slightly and now advised to follow with gastro and number provided to call for appt.  OMER Castaneda

## 2023-11-16 ENCOUNTER — VBI (OUTPATIENT)
Dept: ADMINISTRATIVE | Facility: OTHER | Age: 78
End: 2023-11-16

## 2023-12-22 DIAGNOSIS — E03.9 ACQUIRED HYPOTHYROIDISM: ICD-10-CM

## 2023-12-22 RX ORDER — LEVOTHYROXINE SODIUM 0.03 MG/1
25 TABLET ORAL DAILY
Qty: 90 TABLET | Refills: 1 | Status: SHIPPED | OUTPATIENT
Start: 2023-12-22

## 2023-12-31 DIAGNOSIS — I10 ESSENTIAL HYPERTENSION: ICD-10-CM

## 2024-01-02 RX ORDER — AMLODIPINE BESYLATE 10 MG/1
TABLET ORAL
Qty: 90 TABLET | Refills: 1 | Status: SHIPPED | OUTPATIENT
Start: 2024-01-02

## 2024-01-30 ENCOUNTER — APPOINTMENT (EMERGENCY)
Dept: CT IMAGING | Facility: HOSPITAL | Age: 79
DRG: 483 | End: 2024-01-30
Payer: COMMERCIAL

## 2024-01-30 ENCOUNTER — HOSPITAL ENCOUNTER (INPATIENT)
Facility: HOSPITAL | Age: 79
LOS: 3 days | Discharge: HOME HEALTH | DRG: 483 | End: 2024-02-03
Attending: EMERGENCY MEDICINE | Admitting: SURGERY
Payer: COMMERCIAL

## 2024-01-30 ENCOUNTER — APPOINTMENT (EMERGENCY)
Dept: RADIOLOGY | Facility: HOSPITAL | Age: 79
DRG: 483 | End: 2024-01-30
Payer: COMMERCIAL

## 2024-01-30 DIAGNOSIS — E11.9 TYPE 2 DIABETES MELLITUS WITHOUT COMPLICATION, WITHOUT LONG-TERM CURRENT USE OF INSULIN (HCC): ICD-10-CM

## 2024-01-30 DIAGNOSIS — F41.1 GENERALIZED ANXIETY DISORDER: ICD-10-CM

## 2024-01-30 DIAGNOSIS — F33.9 RECURRENT DEPRESSION (HCC): ICD-10-CM

## 2024-01-30 DIAGNOSIS — I89.0 LYMPHEDEMA OF LEFT ARM: ICD-10-CM

## 2024-01-30 DIAGNOSIS — S02.2XXA CLOSED FRACTURE OF NASAL BONE, INITIAL ENCOUNTER: Primary | ICD-10-CM

## 2024-01-30 DIAGNOSIS — S42.002A FRACTURE OF UNSPECIFIED PART OF LEFT CLAVICLE, INITIAL ENCOUNTER FOR CLOSED FRACTURE: ICD-10-CM

## 2024-01-30 DIAGNOSIS — S42.212A CLOSED DISPLACED FRACTURE OF SURGICAL NECK OF LEFT HUMERUS, UNSPECIFIED FRACTURE MORPHOLOGY, INITIAL ENCOUNTER: ICD-10-CM

## 2024-01-30 DIAGNOSIS — E03.9 ACQUIRED HYPOTHYROIDISM: ICD-10-CM

## 2024-01-30 LAB
ALBUMIN SERPL BCP-MCNC: 3.7 G/DL (ref 3.5–5)
ALP SERPL-CCNC: 91 U/L (ref 34–104)
ALT SERPL W P-5'-P-CCNC: 22 U/L (ref 7–52)
ANION GAP SERPL CALCULATED.3IONS-SCNC: 9 MMOL/L
AST SERPL W P-5'-P-CCNC: 27 U/L (ref 13–39)
BASOPHILS # BLD AUTO: 0.02 THOUSANDS/ÂΜL (ref 0–0.1)
BASOPHILS NFR BLD AUTO: 0 % (ref 0–1)
BILIRUB SERPL-MCNC: 0.55 MG/DL (ref 0.2–1)
BUN SERPL-MCNC: 15 MG/DL (ref 5–25)
CALCIUM SERPL-MCNC: 10.1 MG/DL (ref 8.4–10.2)
CHLORIDE SERPL-SCNC: 103 MMOL/L (ref 96–108)
CO2 SERPL-SCNC: 25 MMOL/L (ref 21–32)
CREAT SERPL-MCNC: 0.9 MG/DL (ref 0.6–1.3)
EOSINOPHIL # BLD AUTO: 0.06 THOUSAND/ÂΜL (ref 0–0.61)
EOSINOPHIL NFR BLD AUTO: 1 % (ref 0–6)
ERYTHROCYTE [DISTWIDTH] IN BLOOD BY AUTOMATED COUNT: 14.7 % (ref 11.6–15.1)
GFR SERPL CREATININE-BSD FRML MDRD: 61 ML/MIN/1.73SQ M
GLUCOSE SERPL-MCNC: 160 MG/DL (ref 65–140)
HCT VFR BLD AUTO: 42.7 % (ref 34.8–46.1)
HGB BLD-MCNC: 13.7 G/DL (ref 11.5–15.4)
IMM GRANULOCYTES # BLD AUTO: 0.06 THOUSAND/UL (ref 0–0.2)
IMM GRANULOCYTES NFR BLD AUTO: 1 % (ref 0–2)
LYMPHOCYTES # BLD AUTO: 3.47 THOUSANDS/ÂΜL (ref 0.6–4.47)
LYMPHOCYTES NFR BLD AUTO: 35 % (ref 14–44)
MCH RBC QN AUTO: 27.2 PG (ref 26.8–34.3)
MCHC RBC AUTO-ENTMCNC: 32.1 G/DL (ref 31.4–37.4)
MCV RBC AUTO: 85 FL (ref 82–98)
MONOCYTES # BLD AUTO: 0.59 THOUSAND/ÂΜL (ref 0.17–1.22)
MONOCYTES NFR BLD AUTO: 6 % (ref 4–12)
NEUTROPHILS # BLD AUTO: 5.62 THOUSANDS/ÂΜL (ref 1.85–7.62)
NEUTS SEG NFR BLD AUTO: 57 % (ref 43–75)
NRBC BLD AUTO-RTO: 0 /100 WBCS
PLATELET # BLD AUTO: 216 THOUSANDS/UL (ref 149–390)
PMV BLD AUTO: 10.1 FL (ref 8.9–12.7)
POTASSIUM SERPL-SCNC: 3.8 MMOL/L (ref 3.5–5.3)
PROT SERPL-MCNC: 6.9 G/DL (ref 6.4–8.4)
RBC # BLD AUTO: 5.03 MILLION/UL (ref 3.81–5.12)
SODIUM SERPL-SCNC: 137 MMOL/L (ref 135–147)
WBC # BLD AUTO: 9.82 THOUSAND/UL (ref 4.31–10.16)

## 2024-01-30 PROCEDURE — 36415 COLL VENOUS BLD VENIPUNCTURE: CPT

## 2024-01-30 PROCEDURE — 70450 CT HEAD/BRAIN W/O DYE: CPT

## 2024-01-30 PROCEDURE — 99285 EMERGENCY DEPT VISIT HI MDM: CPT | Performed by: EMERGENCY MEDICINE

## 2024-01-30 PROCEDURE — 85025 COMPLETE CBC W/AUTO DIFF WBC: CPT

## 2024-01-30 PROCEDURE — 96365 THER/PROPH/DIAG IV INF INIT: CPT

## 2024-01-30 PROCEDURE — 73030 X-RAY EXAM OF SHOULDER: CPT

## 2024-01-30 PROCEDURE — 96375 TX/PRO/DX INJ NEW DRUG ADDON: CPT

## 2024-01-30 PROCEDURE — 71250 CT THORAX DX C-: CPT

## 2024-01-30 PROCEDURE — 73090 X-RAY EXAM OF FOREARM: CPT

## 2024-01-30 PROCEDURE — 99285 EMERGENCY DEPT VISIT HI MDM: CPT

## 2024-01-30 PROCEDURE — 96376 TX/PRO/DX INJ SAME DRUG ADON: CPT

## 2024-01-30 PROCEDURE — 73502 X-RAY EXAM HIP UNI 2-3 VIEWS: CPT

## 2024-01-30 PROCEDURE — 80053 COMPREHEN METABOLIC PANEL: CPT

## 2024-01-30 PROCEDURE — 71045 X-RAY EXAM CHEST 1 VIEW: CPT

## 2024-01-30 PROCEDURE — 73060 X-RAY EXAM OF HUMERUS: CPT

## 2024-01-30 PROCEDURE — 72125 CT NECK SPINE W/O DYE: CPT

## 2024-01-30 PROCEDURE — 73130 X-RAY EXAM OF HAND: CPT

## 2024-01-30 PROCEDURE — 93005 ELECTROCARDIOGRAM TRACING: CPT

## 2024-01-30 RX ORDER — HYDROMORPHONE HCL/PF 1 MG/ML
0.5 SYRINGE (ML) INJECTION ONCE
Status: COMPLETED | OUTPATIENT
Start: 2024-01-30 | End: 2024-01-30

## 2024-01-30 RX ORDER — MORPHINE SULFATE 4 MG/ML
4 INJECTION, SOLUTION INTRAMUSCULAR; INTRAVENOUS ONCE
Status: COMPLETED | OUTPATIENT
Start: 2024-01-30 | End: 2024-01-30

## 2024-01-30 RX ORDER — HYDROMORPHONE HCL/PF 1 MG/ML
1 SYRINGE (ML) INJECTION ONCE
Status: COMPLETED | OUTPATIENT
Start: 2024-01-30 | End: 2024-01-30

## 2024-01-30 RX ADMIN — HYDROMORPHONE HYDROCHLORIDE 1 MG: 1 INJECTION, SOLUTION INTRAMUSCULAR; INTRAVENOUS; SUBCUTANEOUS at 21:59

## 2024-01-30 RX ADMIN — MORPHINE SULFATE 4 MG: 4 INJECTION INTRAVENOUS at 19:31

## 2024-01-30 RX ADMIN — SODIUM CHLORIDE, SODIUM LACTATE, POTASSIUM CHLORIDE, AND CALCIUM CHLORIDE 1000 ML: .6; .31; .03; .02 INJECTION, SOLUTION INTRAVENOUS at 19:29

## 2024-01-30 RX ADMIN — HYDROMORPHONE HYDROCHLORIDE 0.5 MG: 1 INJECTION, SOLUTION INTRAMUSCULAR; INTRAVENOUS; SUBCUTANEOUS at 20:52

## 2024-01-30 NOTE — ED ATTENDING ATTESTATION
1/30/2024  I, Albert Caballero MD, saw and evaluated the patient. I have discussed the patient with the resident/non-physician practitioner and agree with the resident's/non-physician practitioner's findings, Plan of Care, and MDM as documented in the resident's/non-physician practitioner's note, except where noted. All available labs and Radiology studies were reviewed.  I was present for key portions of any procedure(s) performed by the resident/non-physician practitioner and I was immediately available to provide assistance.       At this point I agree with the current assessment done in the Emergency Department.  I have conducted an independent evaluation of this patient a history and physical is as follows:    78-year-old female presented by EMS after a slip and fall down a flight of stairs this evening.  She denied head strike but does have dried blood at the nostrils without active epistaxis.  Complaining of upper extremity and left hip pain.  Unable to range the left shoulder secondary to pain.  Still has intact radial pulse and movement of the fingers, sensation.  No C-spine tenderness but unable to clear clinically due to distracting injury of the left shoulder.  Will check x-rays, CT head and C-spine, reevaluate after pain control.    ED Course  ED Course as of 01/31/24 0030   Tue Jan 30, 2024 2012 X-rays notable for comminuted displaced left humeral head fracture and right upper lung mass.  Pending CTs.   2330 CT head shows:    Progression of now moderate chronic microangiopathic change with no acute intracranial abnormality identified.     Suspected nondisplaced left nasal bone fracture with overlying soft tissue swelling.     There is hyperdense material layering within the left maxillary sinus which could represent hemorrhage within the sinus, sequela of an occult nondisplaced fracture.     2331 CT C-spine shows:    No cervical spine fracture or traumatic malalignment.     Multinodular goiter with  multiple calcified right thyroid nodules.     Incompletely visualized soft tissue mass in the right upper thorax, possibly extrapleural for which a dedicated CT chest with contrast is recommended.     2331 CT chest shows:    1.  Left proximal clavicular fracture  2.  Left proximal humeral fracture  3.  Thyroid goiter with resolution of rightward tracheal deviation     2331 Patient has required multiple doses of IV analgesics for pain control.  Plan admission to trauma service for further management, likely need for rehab.         Critical Care Time  Procedures

## 2024-01-31 ENCOUNTER — ANESTHESIA EVENT (INPATIENT)
Dept: PERIOP | Facility: HOSPITAL | Age: 79
DRG: 483 | End: 2024-01-31
Payer: COMMERCIAL

## 2024-01-31 ENCOUNTER — APPOINTMENT (INPATIENT)
Dept: RADIOLOGY | Facility: HOSPITAL | Age: 79
DRG: 483 | End: 2024-01-31
Payer: COMMERCIAL

## 2024-01-31 ENCOUNTER — APPOINTMENT (INPATIENT)
Dept: CT IMAGING | Facility: HOSPITAL | Age: 79
DRG: 483 | End: 2024-01-31
Payer: COMMERCIAL
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
## 2024-01-31 PROBLEM — S42.002A CLOSED NONDISPLACED FRACTURE OF LEFT CLAVICLE: Status: ACTIVE | Noted: 2024-01-31

## 2024-01-31 PROBLEM — S42.302A CLOSED LEFT HUMERAL FRACTURE: Status: ACTIVE | Noted: 2024-01-31

## 2024-01-31 PROBLEM — S02.2XXA NASAL BONE FRACTURE: Status: ACTIVE | Noted: 2024-01-31

## 2024-01-31 LAB
ABO GROUP BLD: NORMAL
ANION GAP SERPL CALCULATED.3IONS-SCNC: 5 MMOL/L
ATRIAL RATE: 49 BPM
BASOPHILS # BLD AUTO: 0.02 THOUSANDS/ÂΜL (ref 0–0.1)
BASOPHILS NFR BLD AUTO: 0 % (ref 0–1)
BLD GP AB SCN SERPL QL: NEGATIVE
BUN SERPL-MCNC: 19 MG/DL (ref 5–25)
CALCIUM SERPL-MCNC: 9.6 MG/DL (ref 8.4–10.2)
CHLORIDE SERPL-SCNC: 105 MMOL/L (ref 96–108)
CO2 SERPL-SCNC: 28 MMOL/L (ref 21–32)
CREAT SERPL-MCNC: 0.84 MG/DL (ref 0.6–1.3)
EOSINOPHIL # BLD AUTO: 0 THOUSAND/ÂΜL (ref 0–0.61)
EOSINOPHIL NFR BLD AUTO: 0 % (ref 0–6)
ERYTHROCYTE [DISTWIDTH] IN BLOOD BY AUTOMATED COUNT: 14.8 % (ref 11.6–15.1)
GFR SERPL CREATININE-BSD FRML MDRD: 67 ML/MIN/1.73SQ M
GLUCOSE SERPL-MCNC: 110 MG/DL (ref 65–140)
GLUCOSE SERPL-MCNC: 116 MG/DL (ref 65–140)
GLUCOSE SERPL-MCNC: 120 MG/DL (ref 65–140)
GLUCOSE SERPL-MCNC: 158 MG/DL (ref 65–140)
GLUCOSE SERPL-MCNC: 165 MG/DL (ref 65–140)
GLUCOSE SERPL-MCNC: 173 MG/DL (ref 65–140)
HCT VFR BLD AUTO: 37.1 % (ref 34.8–46.1)
HGB BLD-MCNC: 11.9 G/DL (ref 11.5–15.4)
IMM GRANULOCYTES # BLD AUTO: 0.02 THOUSAND/UL (ref 0–0.2)
IMM GRANULOCYTES NFR BLD AUTO: 0 % (ref 0–2)
INR PPP: 0.96 (ref 0.84–1.19)
LYMPHOCYTES # BLD AUTO: 1.2 THOUSANDS/ÂΜL (ref 0.6–4.47)
LYMPHOCYTES NFR BLD AUTO: 18 % (ref 14–44)
MCH RBC QN AUTO: 27.7 PG (ref 26.8–34.3)
MCHC RBC AUTO-ENTMCNC: 32.1 G/DL (ref 31.4–37.4)
MCV RBC AUTO: 86 FL (ref 82–98)
MONOCYTES # BLD AUTO: 0.63 THOUSAND/ÂΜL (ref 0.17–1.22)
MONOCYTES NFR BLD AUTO: 9 % (ref 4–12)
NEUTROPHILS # BLD AUTO: 4.92 THOUSANDS/ÂΜL (ref 1.85–7.62)
NEUTS SEG NFR BLD AUTO: 73 % (ref 43–75)
NRBC BLD AUTO-RTO: 0 /100 WBCS
P AXIS: 40 DEGREES
PLATELET # BLD AUTO: 188 THOUSANDS/UL (ref 149–390)
PMV BLD AUTO: 9.5 FL (ref 8.9–12.7)
POTASSIUM SERPL-SCNC: 4.4 MMOL/L (ref 3.5–5.3)
PR INTERVAL: 150 MS
PROTHROMBIN TIME: 13.3 SECONDS (ref 11.6–14.5)
QRS AXIS: 61 DEGREES
QRSD INTERVAL: 76 MS
QT INTERVAL: 452 MS
QTC INTERVAL: 408 MS
RBC # BLD AUTO: 4.3 MILLION/UL (ref 3.81–5.12)
RH BLD: POSITIVE
SODIUM SERPL-SCNC: 138 MMOL/L (ref 135–147)
SPECIMEN EXPIRATION DATE: NORMAL
T WAVE AXIS: 65 DEGREES
VENTRICULAR RATE: 49 BPM
WBC # BLD AUTO: 6.79 THOUSAND/UL (ref 4.31–10.16)

## 2024-01-31 PROCEDURE — 99223 1ST HOSP IP/OBS HIGH 75: CPT | Performed by: SURGERY

## 2024-01-31 PROCEDURE — 73060 X-RAY EXAM OF HUMERUS: CPT

## 2024-01-31 PROCEDURE — 86901 BLOOD TYPING SEROLOGIC RH(D): CPT

## 2024-01-31 PROCEDURE — 86850 RBC ANTIBODY SCREEN: CPT

## 2024-01-31 PROCEDURE — 82948 REAGENT STRIP/BLOOD GLUCOSE: CPT

## 2024-01-31 PROCEDURE — 86900 BLOOD TYPING SEROLOGIC ABO: CPT

## 2024-01-31 PROCEDURE — 80048 BASIC METABOLIC PNL TOTAL CA: CPT

## 2024-01-31 PROCEDURE — 73200 CT UPPER EXTREMITY W/O DYE: CPT

## 2024-01-31 PROCEDURE — 36415 COLL VENOUS BLD VENIPUNCTURE: CPT

## 2024-01-31 PROCEDURE — 99223 1ST HOSP IP/OBS HIGH 75: CPT | Performed by: STUDENT IN AN ORGANIZED HEALTH CARE EDUCATION/TRAINING PROGRAM

## 2024-01-31 PROCEDURE — 99223 1ST HOSP IP/OBS HIGH 75: CPT | Performed by: NURSE PRACTITIONER

## 2024-01-31 PROCEDURE — 85025 COMPLETE CBC W/AUTO DIFF WBC: CPT

## 2024-01-31 PROCEDURE — 85610 PROTHROMBIN TIME: CPT

## 2024-01-31 PROCEDURE — G1004 CDSM NDSC: HCPCS

## 2024-01-31 RX ORDER — PAROXETINE HYDROCHLORIDE 20 MG/1
20 TABLET, FILM COATED ORAL DAILY
Status: DISCONTINUED | OUTPATIENT
Start: 2024-01-31 | End: 2024-02-03 | Stop reason: HOSPADM

## 2024-01-31 RX ORDER — TRANEXAMIC ACID 10 MG/ML
1000 INJECTION, SOLUTION INTRAVENOUS
Status: CANCELLED | OUTPATIENT
Start: 2024-02-01 | End: 2024-02-02

## 2024-01-31 RX ORDER — SODIUM CHLORIDE, SODIUM GLUCONATE, SODIUM ACETATE, POTASSIUM CHLORIDE, MAGNESIUM CHLORIDE, SODIUM PHOSPHATE, DIBASIC, AND POTASSIUM PHOSPHATE .53; .5; .37; .037; .03; .012; .00082 G/100ML; G/100ML; G/100ML; G/100ML; G/100ML; G/100ML; G/100ML
75 INJECTION, SOLUTION INTRAVENOUS CONTINUOUS
Status: DISCONTINUED | OUTPATIENT
Start: 2024-01-31 | End: 2024-01-31

## 2024-01-31 RX ORDER — INSULIN LISPRO 100 [IU]/ML
1-5 INJECTION, SOLUTION INTRAVENOUS; SUBCUTANEOUS 4 TIMES DAILY
Status: DISCONTINUED | OUTPATIENT
Start: 2024-01-31 | End: 2024-02-03 | Stop reason: HOSPADM

## 2024-01-31 RX ORDER — ENOXAPARIN SODIUM 100 MG/ML
30 INJECTION SUBCUTANEOUS EVERY 12 HOURS
Status: DISCONTINUED | OUTPATIENT
Start: 2024-01-31 | End: 2024-02-03 | Stop reason: HOSPADM

## 2024-01-31 RX ORDER — OXYCODONE HYDROCHLORIDE 5 MG/1
5 TABLET ORAL EVERY 4 HOURS PRN
Status: DISCONTINUED | OUTPATIENT
Start: 2024-01-31 | End: 2024-02-03 | Stop reason: HOSPADM

## 2024-01-31 RX ORDER — ONDANSETRON 2 MG/ML
4 INJECTION INTRAMUSCULAR; INTRAVENOUS EVERY 8 HOURS PRN
Status: DISCONTINUED | OUTPATIENT
Start: 2024-01-31 | End: 2024-02-03 | Stop reason: HOSPADM

## 2024-01-31 RX ORDER — AMLODIPINE BESYLATE 10 MG/1
10 TABLET ORAL DAILY
Status: DISCONTINUED | OUTPATIENT
Start: 2024-01-31 | End: 2024-02-03 | Stop reason: HOSPADM

## 2024-01-31 RX ORDER — LIDOCAINE 50 MG/G
1 PATCH TOPICAL DAILY
Status: DISCONTINUED | OUTPATIENT
Start: 2024-01-31 | End: 2024-02-03 | Stop reason: HOSPADM

## 2024-01-31 RX ORDER — HYDROMORPHONE HCL IN WATER/PF 6 MG/30 ML
0.2 PATIENT CONTROLLED ANALGESIA SYRINGE INTRAVENOUS EVERY 2 HOUR PRN
Status: DISCONTINUED | OUTPATIENT
Start: 2024-01-31 | End: 2024-02-02

## 2024-01-31 RX ORDER — SENNOSIDES 8.6 MG
2 TABLET ORAL DAILY
Status: DISCONTINUED | OUTPATIENT
Start: 2024-01-31 | End: 2024-02-03 | Stop reason: HOSPADM

## 2024-01-31 RX ORDER — POLYETHYLENE GLYCOL 3350 17 G/17G
17 POWDER, FOR SOLUTION ORAL DAILY
Status: DISCONTINUED | OUTPATIENT
Start: 2024-01-31 | End: 2024-02-03 | Stop reason: HOSPADM

## 2024-01-31 RX ORDER — LEVOTHYROXINE SODIUM 0.03 MG/1
25 TABLET ORAL
Status: DISCONTINUED | OUTPATIENT
Start: 2024-01-31 | End: 2024-02-03 | Stop reason: HOSPADM

## 2024-01-31 RX ORDER — ACETAMINOPHEN 325 MG/1
975 TABLET ORAL EVERY 8 HOURS SCHEDULED
Status: DISCONTINUED | OUTPATIENT
Start: 2024-01-31 | End: 2024-02-03 | Stop reason: HOSPADM

## 2024-01-31 RX ADMIN — OXYCODONE HYDROCHLORIDE 5 MG: 5 TABLET ORAL at 07:27

## 2024-01-31 RX ADMIN — LEVOTHYROXINE SODIUM 25 MCG: 25 TABLET ORAL at 07:26

## 2024-01-31 RX ADMIN — ACETAMINOPHEN 975 MG: 325 TABLET, FILM COATED ORAL at 13:19

## 2024-01-31 RX ADMIN — LIDOCAINE 5% 1 PATCH: 700 PATCH TOPICAL at 09:09

## 2024-01-31 RX ADMIN — PAROXETINE HYDROCHLORIDE 20 MG: 20 TABLET, FILM COATED ORAL at 09:09

## 2024-01-31 RX ADMIN — HYDROMORPHONE HYDROCHLORIDE 0.2 MG: 0.2 INJECTION, SOLUTION INTRAMUSCULAR; INTRAVENOUS; SUBCUTANEOUS at 18:33

## 2024-01-31 RX ADMIN — AMLODIPINE BESYLATE 10 MG: 10 TABLET ORAL at 09:09

## 2024-01-31 RX ADMIN — ACETAMINOPHEN 975 MG: 325 TABLET, FILM COATED ORAL at 22:06

## 2024-01-31 RX ADMIN — INSULIN LISPRO 1 UNITS: 100 INJECTION, SOLUTION INTRAVENOUS; SUBCUTANEOUS at 01:48

## 2024-01-31 RX ADMIN — OXYCODONE HYDROCHLORIDE 5 MG: 5 TABLET ORAL at 15:45

## 2024-01-31 RX ADMIN — SODIUM CHLORIDE, SODIUM GLUCONATE, SODIUM ACETATE, POTASSIUM CHLORIDE, MAGNESIUM CHLORIDE, SODIUM PHOSPHATE, DIBASIC, AND POTASSIUM PHOSPHATE 75 ML/HR: .53; .5; .37; .037; .03; .012; .00082 INJECTION, SOLUTION INTRAVENOUS at 12:59

## 2024-01-31 RX ADMIN — ENOXAPARIN SODIUM 30 MG: 30 INJECTION SUBCUTANEOUS at 13:18

## 2024-01-31 RX ADMIN — ENOXAPARIN SODIUM 30 MG: 30 INJECTION SUBCUTANEOUS at 01:42

## 2024-01-31 RX ADMIN — HYDROMORPHONE HYDROCHLORIDE 0.2 MG: 0.2 INJECTION, SOLUTION INTRAMUSCULAR; INTRAVENOUS; SUBCUTANEOUS at 11:00

## 2024-01-31 RX ADMIN — ACETAMINOPHEN 975 MG: 325 TABLET, FILM COATED ORAL at 01:41

## 2024-01-31 RX ADMIN — SENNOSIDES 17.2 MG: 8.6 TABLET, FILM COATED ORAL at 09:10

## 2024-01-31 NOTE — PHYSICAL THERAPY NOTE
Physical Therapy Cancellation Note             01/31/24 1548   Note Type   Note type Cancelled Session   Cancel Reasons Other   Additional Comments PT orders received. Chart reviewed. Pt admit s/p fall with comminuted proximal humeral fracture and left clavicel fracture. Pt planned for operative intervention 2/1/24. Will hold PT eval and follow up postoperatively. Please place new activity orders and WBS post op.     Clay Chen, PT

## 2024-01-31 NOTE — OCCUPATIONAL THERAPY NOTE
Occupational Therapy Cancellation Note     Patient Name: Eulalio Raphael  Today's Date: 1/31/2024 01/31/24 1523   Note Type   Note type Cancelled Session   Cancel Reasons Other   Additional Comments OT orders received and chart reviewed. Pt admitted to ED s/p a fall down multiple steps resulting in 78 a highly comminuted proximal humeral fracture, as well as left clavicle fractures. Pt is planned for OR intervention on 2/1/24. Will hold OT evaluation at this time and f/u post-operatively with appropriate weight-bearing and activity orders.     Anaya Zaidi MS OTR/L   [place] Licensure# [id number]

## 2024-01-31 NOTE — ED NOTES
During rounding RN asked patient if she needed to utilize the bathroom. Pt said she was try. Pt placed on bedpan but was unable to urinate. Pt assisted to bedside commode, but was still unable to urinate. Pt said she felt no pressure or feeling of the need to urinate but have not voided since last night. Bladder scan completed and showed 200cc in bladder. Bridger ECHAVARRIA made aware. No order for straight cath at this time. Will continue to monitor patient.      Columba Jain RN  01/31/24 1772

## 2024-01-31 NOTE — CONSULTS
Oral and Maxillofacial Surgery Consult    Pt seen 01/31/24 12:19 PM     Source of information: patient, daughter bedside, chart    HPI: 78 y.o. female presents to ED s/p fall down 15 steps at home. Pt states she lost her balance. Found by family at bottom of steps. Denies LOC. OMS consulted for minimally displaced nasal bone fracture. Consult requested by trauma.    PMH:   Past Medical History:   Diagnosis Date    Breast cancer (HCC)     Hypertension         Allergies:   Allergies   Allergen Reactions    Ace Inhibitors Angioedema    Erythromycin Swelling       Meds:     Current Facility-Administered Medications:     acetaminophen (TYLENOL) tablet 975 mg, 975 mg, Oral, Q8H MAGALI, Veronica Callejas MD, 975 mg at 01/31/24 0141    amLODIPine (NORVASC) tablet 10 mg, 10 mg, Oral, Daily, Veronica Callejas MD, 10 mg at 01/31/24 0909    enoxaparin (LOVENOX) subcutaneous injection 30 mg, 30 mg, Subcutaneous, Q12H, Veronica Callejas MD, 30 mg at 01/31/24 0142    HYDROmorphone HCl (DILAUDID) injection 0.2 mg, 0.2 mg, Intravenous, Q2H PRN, Veronica Callejas MD, 0.2 mg at 01/31/24 1100    insulin lispro (HumaLOG) 100 units/mL subcutaneous injection 1-5 Units, 1-5 Units, Subcutaneous, 4x Daily, 1 Units at 01/31/24 0148 **AND** Fingerstick Glucose (POCT), , , 4x Daily, Veronica Callejas MD    levothyroxine tablet 25 mcg, 25 mcg, Oral, Early Morning, Veronica Callejas MD, 25 mcg at 01/31/24 0726    lidocaine (LIDODERM) 5 % patch 1 patch, 1 patch, Topical, Daily, Veronica Callejas MD, 1 patch at 01/31/24 0909    multi-electrolyte (PLASMALYTE-A/ISOLYTE-S PH 7.4) IV solution, 75 mL/hr, Intravenous, Continuous, Bridger May PA-C    naloxone (NARCAN) 0.04 mg/mL syringe 0.04 mg, 0.04 mg, Intravenous, Q1MIN PRN, Veronica Callejas MD    ondansetron (ZOFRAN) injection 4 mg, 4 mg, Intravenous, Q8H PRN, Veronica Callejas MD    oxyCODONE (ROXICODONE) split tablet 2.5 mg, 2.5 mg, Oral, Q4H PRN **OR** oxyCODONE (ROXICODONE) IR tablet 5  mg, 5 mg, Oral, Q4H PRN, Veronica Callejas MD, 5 mg at 01/31/24 0727    PARoxetine (PAXIL) tablet 20 mg, 20 mg, Oral, Daily, Veronica Callejas MD, 20 mg at 01/31/24 0909    polyethylene glycol (MIRALAX) packet 17 g, 17 g, Oral, Daily, Veronica Callejas MD    senna (SENOKOT) tablet 17.2 mg, 2 tablet, Oral, Daily, Veronica Callejas MD, 17.2 mg at 01/31/24 0910    Current Outpatient Medications:     amLODIPine (NORVASC) 10 mg tablet, TAKE 1 TABLET BY MOUTH EVERY DAY, Disp: 90 tablet, Rfl: 1    levothyroxine 25 mcg tablet, TAKE 1 TABLET BY MOUTH EVERY DAY, Disp: 90 tablet, Rfl: 1    metFORMIN (GLUCOPHAGE-XR) 500 mg 24 hr tablet, Take 2 tablets (1,000 mg total) by mouth 2 (two) times a day with meals, Disp: 360 tablet, Rfl: 1    Multiple Vitamin (multivitamin) capsule, Take 1 capsule by mouth daily, Disp: , Rfl:     PARoxetine (PAXIL) 20 mg tablet, Take 1 tablet (20 mg total) by mouth daily, Disp: 90 tablet, Rfl: 1    PSH:   Past Surgical History:   Procedure Laterality Date    HYSTERECTOMY      MASTECTOMY MODIFIED RADICAL Bilateral     SPINE SURGERY      THYROIDECTOMY N/A 6/21/2023    Procedure: LEFT HEMITHYROIDECTOMY, SUBSTERNAL;  Surgeon: Leonel James MD;  Location: [facility];  Service: ENT      Family History   Problem Relation Age of Onset    Hypertension Father     Hypertension Sister     Hypertension Brother         Review of Systems   Constitutional:  Negative for chills, diaphoresis and fatigue.   HENT:  Positive for facial swelling (Minimal swelling around base of nose) and nosebleeds (now since stopped). Negative for dental problem (No change to occlusion), mouth sores and trouble swallowing.    Eyes:  Negative for photophobia, pain and visual disturbance.   Respiratory:  Negative for shortness of breath.    Cardiovascular:  Negative for chest pain.   Neurological:  Negative for dizziness, syncope, facial asymmetry, numbness and headaches.        Temp:  [97.7 °F (36.5 °C)] 97.7 °F (36.5  °C)  HR:  [43-60] 60  Resp:  [16-18] 18  BP: (127-168)/(60-77) 137/64  SpO2:  [94 %-99 %] 97 %       Intake/Output Summary (Last 24 hours) at 1/31/2024 1219  Last data filed at 1/30/2024 2029  Gross per 24 hour   Intake 1000 ml   Output --   Net 1000 ml        Physical Exam:  Gen: AAOx3. NAD.   Resp: Unlabored on RA.  Neuro:  Intact CN V2-V3 CN VII  HEENT:   Eye: EOMI w/ no signs of muscle entrapment. PERRLA. No subconjunctival hemorrhage. No periorbital ecchymosis/edema. No changes to vision, diplopia, exophthalmos, enophthalmos.   Ears: No blood visualized in the EAC. no changes in hearing.  Nose: Dried blood at b/l nares No nasal dorsum deviation. No septal hematoma.   Extraoral:  Minimal  facial swelling associated with base of nose and consistent with the injury. No ecchymosis. No bony step-off palpated. No laceration present. TTP over nasal bridge and occiput without overlying lesion. No LAD. No trismus. No guarding.  Intraoral: ANDREW ~35mm. Occlusion stable and reproducible. No mobile teeth. Partially edentulous maxilla and mandible. No gingival laceration or ecchymosis. FOM soft, non-elevated, non-tender. Uvula midline.    Imaging:  CT Head 1/30/24  MPRESSION:     -Progression of now moderate chronic microangiopathic change with no acute intracranial abnormality identified.  -Suspected nondisplaced left nasal bone fracture with overlying soft tissue swelling.  -There is hyperdense material layering within the left maxillary sinus which could represent hemorrhage within the sinus, sequela of an occult nondisplaced fracture.    Assessment  78 y.o. female with HTN, DMT2, hypothyroidism, FELY, lymphedema of left arm, breast cancer in remission, papillary thyroid cancer in remission, depression present s/p fall. Found to have L proximal clavicle and humerus fx. OMS consulted for nondisplaced nasal bone fracture. No cosmetic or functional deformity caused by nasal bone fracture. No indication for surgical  repair.    Plan:  - Analgesia as per ED  - Diet as tolerated  - Encourage good oral hygiene  - Head of bed elevated  - Ice to face as needed for first 24 hours then heat for comfort to face  - F/U in 1 week at [facility] clinic outpatient for re-evaluation  D/w OMFS attg on call AWADALLAH     Inpatient consult to Oral and Maxillofacial Surgery  Consult performed by: Anderson Laureano  Consult ordered by: Veronica Callejas MD           Counseling / Coordination of Care  Total floor / unit time spent today 30 minutes.  Greater than 50% of total time was spent with the patient and / or family counseling and / or coordination of care.  A description of the counseling / coordination of care: description of injury with proposed plan of care. Discussed risks, benefits, and alternatives to treatment plan. All questions were answered. Patient in agreement with plan.

## 2024-01-31 NOTE — PLAN OF CARE
Problem: PAIN - ADULT  Goal: Verbalizes/displays adequate comfort level or baseline comfort level  Description: Interventions:  - Encourage patient to monitor pain and request assistance  - Assess pain using appropriate pain scale  - Administer analgesics based on type and severity of pain and evaluate response  - Implement non-pharmacological measures as appropriate and evaluate response  - Consider cultural and social influences on pain and pain management  - Notify physician/advanced practitioner if interventions unsuccessful or patient reports new pain  Outcome: Progressing     Problem: INFECTION - ADULT  Goal: Absence or prevention of progression during hospitalization  Description: INTERVENTIONS:  - Assess and monitor for signs and symptoms of infection  - Monitor lab/diagnostic results  - Monitor all insertion sites, i.e. indwelling lines, tubes, and drains  - Monitor endotracheal if appropriate and nasal secretions for changes in amount and color  - [place] appropriate cooling/warming therapies per order  - Administer medications as ordered  - Instruct and encourage patient and family to use good hand hygiene technique  - Identify and instruct in appropriate isolation precautions for identified infection/condition  Outcome: Progressing     Problem: INFECTION - ADULT  Goal: Absence of fever/infection during neutropenic period  Description: INTERVENTIONS:  - Monitor WBC    Outcome: Progressing     Problem: SAFETY ADULT  Goal: Patient will remain free of falls  Description: INTERVENTIONS:  - Educate patient/family on patient safety including physical limitations  - Instruct patient to call for assistance with activity   - Consult OT/PT to assist with strengthening/mobility   - Keep Call bell within reach  - Keep bed low and locked with side rails adjusted as appropriate  - Keep care items and personal belongings within reach  - Initiate and maintain comfort rounds  - Make Fall Risk Sign visible to staff  -  Offer Toileting every 2 Hours, in advance of need  - Initiate/Maintain 2alarm  - Obtain necessary fall risk management equipment: 2  - Apply yellow socks and bracelet for high fall risk patients  - Consider moving patient to room near nurses station  Outcome: Progressing  Goal: Maintain or return to baseline ADL function  Description: INTERVENTIONS:  -  Assess patient's ability to carry out ADLs; assess patient's baseline for ADL function and identify physical deficits which impact ability to perform ADLs (bathing, care of mouth/teeth, toileting, grooming, dressing, etc.)  - Assess/evaluate cause of self-care deficits   - Assess range of motion  - Assess patient's mobility; develop plan if impaired  - Assess patient's need for assistive devices and provide as appropriate  - Encourage maximum independence but intervene and supervise when necessary  - Involve family in performance of ADLs  - Assess for home care needs following discharge   - Consider OT consult to assist with ADL evaluation and planning for discharge  - Provide patient education as appropriate  Outcome: Progressing  Goal: Maintains/Returns to pre admission functional level  Description: INTERVENTIONS:  - Perform AM-PAC 6 Click Basic Mobility/ Daily Activity assessment daily.  - Set and communicate daily mobility goal to care team and patient/family/caregiver.   - Collaborate with rehabilitation services on mobility goals if consulted  - Perform Range of Motion 2 times a day.  - Reposition patient every 2 hours.  - Dangle patient 2 times a day  - Stand patient 2 times a day  - Ambulate patient 2 times a day  - Out of bed to chair 2 times a day   - Out of bed for meals 2 times a day   - Out of bed for toileting  - Record patient progress and toleration of activity level   Outcome: Progressing     Problem: DISCHARGE PLANNING  Goal: Discharge to home or other facility with appropriate resources  Description: INTERVENTIONS:  - Identify barriers to  discharge w/patient and caregiver  - Arrange for needed discharge resources and transportation as appropriate  - Identify discharge learning needs (meds, wound care, etc.)  - Arrange for interpretive services to assist at discharge as needed  - Refer to Case Management Department for coordinating discharge planning if the patient needs post-hospital services based on physician/advanced practitioner order or complex needs related to functional status, cognitive ability, or social support system  Outcome: Progressing     Problem: Knowledge Deficit  Goal: Patient/family/caregiver demonstrates understanding of disease process, treatment plan, medications, and discharge instructions  Description: Complete learning assessment and assess knowledge base.  Interventions:  - Provide teaching at level of understanding  - Provide teaching via preferred learning methods  Outcome: Progressing

## 2024-01-31 NOTE — ED PROVIDER NOTES
"History  Chief Complaint   Patient presents with    Fall     Pt arrives via ems from home, fall down flight of stairs, given 25 mcg of fentanyl via ems, c-collar placed by ems, headstrike, denies LOC/thinners, left arm pain     (Eulalio Raphael) Eulalio Raphael is a 78 y.o. female     They presented to the emergency department on January 30, 2024. Patient presents with:  Fall: Pt arrives via ems from home, fall down flight of stairs, given 25 mcg of fentanyl via ems, c-collar placed by ems, headstrike, denies LOC/thinners, and severe left arm pain. Patient states the fall was due to slipping. The patient denies palpitations, chest pain, SOB, dizziness, change in vision, weakness prior to fall. Patient has a history of papillary thyroid cancer, breast cancer, and diabetes. Patient states all cancers are currently in remission for the last few years. Patient denies any recent changes in medications. Patient states the \"entire left shoulder and arm hurts to the touch and unable to move it due to pain.\" Patient denies current dizziness, nausea, vomiting, chest pain, SOB, abdominal pain, diarrhea, constipation, rash, or any other complaint at this time.                Prior to Admission Medications   Prescriptions Last Dose Informant Patient Reported? Taking?   Multiple Vitamin (multivitamin) capsule  Self Yes No   Sig: Take 1 capsule by mouth daily   PARoxetine (PAXIL) 20 mg tablet  Self No No   Sig: Take 1 tablet (20 mg total) by mouth daily   amLODIPine (NORVASC) 10 mg tablet   No No   Sig: TAKE 1 TABLET BY MOUTH EVERY DAY   levothyroxine 25 mcg tablet   No No   Sig: TAKE 1 TABLET BY MOUTH EVERY DAY   metFORMIN (GLUCOPHAGE-XR) 500 mg 24 hr tablet   No No   Sig: Take 2 tablets (1,000 mg total) by mouth 2 (two) times a day with meals      Facility-Administered Medications: None       Past Medical History:   Diagnosis Date    Breast cancer (HCC)     Hypertension        Past Surgical History:   Procedure Laterality Date    " HYSTERECTOMY      MASTECTOMY MODIFIED RADICAL Bilateral     SPINE SURGERY      THYROIDECTOMY N/A 6/21/2023    Procedure: LEFT HEMITHYROIDECTOMY, SUBSTERNAL;  Surgeon: Leonel James MD;  Location: [facility];  Service: ENT       Family History   Problem Relation Age of Onset    Hypertension Father     Hypertension Sister     Hypertension Brother      I have reviewed and agree with the history as documented.    E-Cigarette/Vaping    E-Cigarette Use Never User      E-Cigarette/Vaping Substances    Nicotine No     THC No     CBD No     Flavoring No     Other No     Unknown No      Social History     Tobacco Use    Smoking status: Never     Passive exposure: Never    Smokeless tobacco: Never   Vaping Use    Vaping status: Never Used   Substance Use Topics    Alcohol use: Yes     Alcohol/week: 1.0 standard drink of alcohol     Types: 1 Glasses of wine per week    Drug use: Never        Review of Systems   Constitutional:  Negative for chills and fever.   HENT:  Negative for sore throat.    Eyes:  Negative for photophobia and visual disturbance.   Respiratory:  Negative for cough and shortness of breath.    Cardiovascular:  Negative for chest pain and palpitations.   Gastrointestinal:  Negative for abdominal pain, blood in stool, constipation, diarrhea, nausea and vomiting.   Genitourinary:  Negative for dysuria and hematuria.   Musculoskeletal:  Negative for arthralgias and back pain.   Skin:  Negative for color change and rash.   Neurological:  Negative for seizures and syncope.   All other systems reviewed and are negative.      Physical Exam  ED Triage Vitals   Temperature Pulse Respirations Blood Pressure SpO2   01/30/24 1825 01/30/24 1825 01/30/24 1825 01/30/24 1826 01/30/24 1825   97.7 °F (36.5 °C) (!) 44 16 168/74 99 %      Temp Source Heart Rate Source Patient Position - Orthostatic VS BP Location FiO2 (%)   01/30/24 1825 01/30/24 1825 01/30/24 2030 01/30/24 2030 --   Oral Monitor Lying Right arm        Pain Score       01/30/24 1931       8             Orthostatic Vital Signs  Vitals:    01/30/24 1825 01/30/24 1826 01/30/24 2030 01/30/24 2200   BP:  168/74 163/71 146/67   Pulse: (!) 44  (!) 49 (!) 51   Patient Position - Orthostatic VS:   Lying Sitting       Physical Exam  Vitals and nursing note reviewed. Exam conducted with a chaperone present.   Constitutional:       General: She is not in acute distress.     Appearance: She is well-developed.   HENT:      Head: Normocephalic and atraumatic.      Nose:      Comments: Both nostrils covered in dried blood    Eyes:      Conjunctiva/sclera: Conjunctivae normal.      Pupils: Pupils are equal, round, and reactive to light.   Cardiovascular:      Rate and Rhythm: Regular rhythm. Bradycardia present.      Pulses: Normal pulses.      Heart sounds: Normal heart sounds. No murmur heard.     No friction rub. No gallop.   Pulmonary:      Effort: Pulmonary effort is normal. No respiratory distress.      Breath sounds: Normal breath sounds. No stridor. No wheezing, rhonchi or rales.   Chest:      Chest wall: Tenderness (left upper chest on ribs) present.   Abdominal:      Palpations: Abdomen is soft. There is no mass.      Tenderness: There is no abdominal tenderness. There is no right CVA tenderness, left CVA tenderness or guarding.      Hernia: No hernia is present.   Musculoskeletal:         General: No swelling.      Right shoulder: Normal.      Left shoulder: Tenderness and bony tenderness present. Decreased range of motion.      Left upper arm: Edema, tenderness and bony tenderness present.      Left elbow: Decreased range of motion. Tenderness present.      Left forearm: Edema, tenderness and bony tenderness present.      Right wrist: Normal.      Left wrist: Effusion, tenderness and bony tenderness present. Decreased range of motion.      Left hand: Tenderness and bony tenderness present.      Cervical back: Neck supple. Tenderness present.      Thoracic back:  Tenderness present.      Lumbar back: No tenderness or bony tenderness.      Right hip: Normal. No tenderness or bony tenderness. Normal range of motion.      Left hip: Tenderness and bony tenderness present. Normal range of motion.      Right upper leg: Normal. No tenderness or bony tenderness.      Left upper leg: Normal. No tenderness or bony tenderness.      Right knee: Normal. No bony tenderness. Normal range of motion. No tenderness.      Left knee: Normal. No bony tenderness. Normal range of motion. No tenderness.      Right lower leg: Normal. No tenderness or bony tenderness. No edema.      Left lower leg: Normal. No tenderness or bony tenderness. No edema.      Right ankle:      Right Achilles Tendon: Normal.      Left ankle:      Left Achilles Tendon: Normal.      Right foot: Normal.      Left foot: Normal.   Skin:     General: Skin is warm and dry.      Capillary Refill: Capillary refill takes less than 2 seconds.   Neurological:      Mental Status: She is alert.   Psychiatric:         Mood and Affect: Mood normal.         ED Medications  Medications   enoxaparin (LOVENOX) subcutaneous injection 30 mg (has no administration in time range)   acetaminophen (TYLENOL) tablet 975 mg (has no administration in time range)   oxyCODONE (ROXICODONE) split tablet 2.5 mg (has no administration in time range)     Or   oxyCODONE (ROXICODONE) IR tablet 5 mg (has no administration in time range)   HYDROmorphone HCl (DILAUDID) injection 0.2 mg (has no administration in time range)   naloxone (NARCAN) 0.04 mg/mL syringe 0.04 mg (has no administration in time range)   senna (SENOKOT) tablet 17.2 mg (has no administration in time range)   polyethylene glycol (MIRALAX) packet 17 g (has no administration in time range)   ondansetron (ZOFRAN) injection 4 mg (has no administration in time range)   lidocaine (LIDODERM) 5 % patch 1 patch (has no administration in time range)   levothyroxine tablet 25 mcg (has no administration in  time range)   amLODIPine (NORVASC) tablet 10 mg (has no administration in time range)   PARoxetine (PAXIL) tablet 20 mg (has no administration in time range)   insulin lispro (HumaLOG) 100 units/mL subcutaneous injection 1-5 Units (has no administration in time range)   morphine injection 4 mg (4 mg Intravenous Given 1/30/24 1931)   lactated ringers bolus 1,000 mL (0 mL Intravenous Stopped 1/30/24 2029)   HYDROmorphone (DILAUDID) injection 0.5 mg (0.5 mg Intravenous Given 1/30/24 2052)   HYDROmorphone (DILAUDID) injection 1 mg (1 mg Intravenous Given 1/30/24 2159)       Diagnostic Studies  Results Reviewed       Procedure Component Value Units Date/Time    Comprehensive metabolic panel [[id number]]  (Abnormal) Collected: 01/30/24 1930    Lab Status: Final result Specimen: Blood from Arm, Right Updated: 01/30/24 1951     Sodium 137 mmol/L      Potassium 3.8 mmol/L      Chloride 103 mmol/L      CO2 25 mmol/L      ANION GAP 9 mmol/L      BUN 15 mg/dL      Creatinine 0.90 mg/dL      Glucose 160 mg/dL      Calcium 10.1 mg/dL      AST 27 U/L      ALT 22 U/L      Alkaline Phosphatase 91 U/L      Total Protein 6.9 g/dL      Albumin 3.7 g/dL      Total Bilirubin 0.55 mg/dL      eGFR 61 ml/min/1.73sq m     Narrative:      [employer] guidelines for Chronic Kidney Disease (CKD):     Stage 1 with normal or high GFR (GFR > 90 mL/min/1.73 square meters)    Stage 2 Mild CKD (GFR = 60-89 mL/min/1.73 square meters)    Stage 3A Moderate CKD (GFR = 45-59 mL/min/1.73 square meters)    Stage 3B Moderate CKD (GFR = 30-44 mL/min/1.73 square meters)    Stage 4 Severe CKD (GFR = 15-29 mL/min/1.73 square meters)    Stage 5 End Stage CKD (GFR <15 mL/min/1.73 square meters)  Note: GFR calculation is accurate only with a steady state creatinine    CBC and differential [[id number]] Collected: 01/30/24 1930    Lab Status: Final result Specimen: Blood from Arm, Right Updated: 01/30/24 1938     WBC 9.82 Thousand/uL      RBC  "5.03 Million/uL      Hemoglobin 13.7 g/dL      Hematocrit 42.7 %      MCV 85 fL      MCH 27.2 pg      MCHC 32.1 g/dL      RDW 14.7 %      MPV 10.1 fL      Platelets 216 Thousands/uL      nRBC 0 /100 WBCs      Neutrophils Relative 57 %      Immat GRANS % 1 %      Lymphocytes Relative 35 %      Monocytes Relative 6 %      Eosinophils Relative 1 %      Basophils Relative 0 %      Neutrophils Absolute 5.62 Thousands/µL      Immature Grans Absolute 0.06 Thousand/uL      Lymphocytes Absolute 3.47 Thousands/µL      Monocytes Absolute 0.59 Thousand/µL      Eosinophils Absolute 0.06 Thousand/µL      Basophils Absolute 0.02 Thousands/µL                    CT head without contrast   Final Result by Ac Charles DO (01/30 2037)      Progression of now moderate chronic microangiopathic change with no acute intracranial abnormality identified.      Suspected nondisplaced left nasal bone fracture with overlying soft tissue swelling.      There is hyperdense material layering within the left maxillary sinus which could represent hemorrhage within the sinus, sequela of an occult nondisplaced fracture.      This examination was marked \"immediate notification\" in Epic in order to begin the standard process by which the radiology reading room liaison alerts the referring practitioner.                  Workstation performed: [id number]         CT cervical spine without contrast   Final Result by Pallav N Shah, MD (01/30 2023)      No cervical spine fracture or traumatic malalignment.      Multinodular goiter with multiple calcified right thyroid nodules.      Incompletely visualized soft tissue mass in the right upper thorax, possibly extrapleural for which a dedicated CT chest with contrast is recommended.      The study was marked in EPIC for significant notification.               Workstation performed: [id number]         CT chest wo contrast   Final Result by Lucius Morillo MD (01/30 2205)      1.  Left proximal " clavicular fracture   2.  Left proximal humeral fracture   3.  Thyroid goiter with resolution of rightward tracheal deviation               Workstation performed: [id number]         XR hip/pelv 2-3 vws left    (Results Pending)   XR shoulder 2+ views LEFT    (Results Pending)   XR hand 3+ views LEFT    (Results Pending)   XR humerus LEFT    (Results Pending)   XR forearm 2 views LEFT    (Results Pending)   XR chest 1 view portable    (Results Pending)         Procedures  ECG 12 Lead Documentation Only    Date/Time: 1/30/2024 11:19 PM    Performed by: Mesfin Gonzales MD  Authorized by: Mesfin Gonzales MD    Indications / Diagnosis:  Fall  ECG reviewed by me, the ED Provider: yes    Patient location:  ED  Previous ECG:     Previous ECG:  Unavailable  Interpretation:     Interpretation: non-specific    Rate:     ECG rate:  49    ECG rate assessment: bradycardic    Rhythm:     Rhythm: sinus bradycardia    Ectopy:     Ectopy: none    QRS:     QRS axis:  Normal    QRS intervals:  Normal  Conduction:     Conduction: normal    ST segments:     ST segments:  Normal  T waves:     T waves: normal          ED Course  ED Course as of 01/31/24 0118 Tue Jan 30, 2024 2020 Patient re-evaluated pain mildy improved from 10/10 to 8/10 patient asking for more main medication.   2150 Patient re-evaluated pain is improved from 8/10 to 7/10.                                       Medical Decision Making  Patient presents with:  Fall: Pt arrives via ems from home, fall down flight of stairs, given 25 mcg of fentanyl via ems, c-collar placed by ems, headstrike, denies LOC/thinners, and severe left arm pain. Patient states the fall was due to slipping. The patient denies palpitations, chest pain, SOB, dizziness, change in vision, weakness prior to fall. Patient has a history of papillary thyroid cancer, breast cancer, and diabetes.      Patient seen and examined noted to have left shoulder and upper extremity pain to palpation and limited movement.  Cervical and thoracic spine tenderness on palpation. Left hip pain on palpation and movement. Tenderness of 3rd and 4th rib on the left of the sternum.    Differential diagnosis includes but is not limited to left humeral fracture, left shoulder fracture, left wrist fracture, left hand fracture, intercranial hemorrhage, cervical spine fracture, thoracic spinal fracture, left hip fracture, Rib fractures.     Patient's labs notable for: mildly elevated glucose, otherwise unremarkable CBC, CMP.    Imaging revealed:   CT chest w/o contrast  IMPRESSION:  1.  Left proximal clavicular fracture  2.  Left proximal humeral fracture  3.  Thyroid goiter with resolution of rightward tracheal deviation    CT cervical spine without contrast  IMPRESSION:  No cervical spine fracture or traumatic malalignment.  Multinodular goiter with multiple calcified right thyroid nodules.  Incompletely visualized soft tissue mass in the right upper thorax, possibly extrapleural for which a dedicated CT chest with contrast is recommended.    CT head without contrast  IMPRESSION:  Progression of now moderate chronic microangiopathic change with no acute intracranial abnormality identified.  Suspected nondisplaced left nasal bone fracture with overlying soft tissue swelling.  There is hyperdense material layering within the left maxillary sinus which could represent hemorrhage within the sinus, sequela of an occult nondisplaced fracture    XR hip pelvis 2-3 views  Wet read: no acute osseous abnormalities.    XR shoulder left 2-3 view  Wet read: proximal humeral fracture    XR hand 3 view left  Wet read: no acute osseous abnormalities    XR humerus left  Wet read: proximal humeral fracture  EKG: interpreted by myself as above.    XR forearm bone left  Wet read: no acute osseous abnormalities    XR chest  Wet read: mass in right apical lungs with right tracheal deviation.     Patient was given two doses of dilaudid and morphine for pain.     Trauma  consulted (dr. Veronica Callejas) recommend admission for pain control.     Discussed patient's case with Dr. Bond  regarding admission who accepted the patient for further evaluation and management.      Amount and/or Complexity of Data Reviewed  Labs: ordered.  Radiology: ordered.    Risk  Prescription drug management.          Disposition  Final diagnoses:   Closed fracture of nasal bone, initial encounter   Fracture of unspecified part of left clavicle, initial encounter for closed fracture   Closed displaced fracture of surgical neck of left humerus, unspecified fracture morphology, initial encounter   Recurrent depression (HCC)   Lymphedema of left arm   Acquired hypothyroidism   Type 2 diabetes mellitus without complication, without long-term current use of insulin (HCC)   Generalized anxiety disorder     Time reflects when diagnosis was documented in both MDM as applicable and the Disposition within this note       Time User Action Codes Description Comment    1/31/2024 12:40 AM Veronica Callejas [S02.2XXA] Closed fracture of nasal bone, initial encounter     1/31/2024 12:44 AM Veronica Callejas [S42.002A] Fracture of unspecified part of left clavicle, initial encounter for closed fracture     1/31/2024 12:45 AM Veronica Callejas [S42.212A] Closed displaced fracture of surgical neck of left humerus, unspecified fracture morphology, initial encounter     1/31/2024 12:45 AM Veronica Callejas Add [F33.9] Recurrent depression (HCC)     1/31/2024 12:45 AM Veronica Callejas Add [I89.0] Lymphedema of left arm     1/31/2024 12:45 AM Veronica Callejas Add [E03.9] Acquired hypothyroidism     1/31/2024 12:45 AM Veronica Callejas [E11.9] Type 2 diabetes mellitus without complication, without long-term current use of insulin (HCC)     1/31/2024 12:45 AM Veronica Callejas Add [F41.1] Generalized anxiety disorder           ED Disposition       None          Follow-up Information    None          Patient's Medications   Discharge Prescriptions    No medications on file     No discharge procedures on file.    PDMP Review       None             ED Provider  Attending physically available and evaluated Eulalio Raphael. I managed the patient along with the ED Attending.    Electronically Signed by           Mesfin Gonzales MD  01/31/24 0103       Mesfin Gonzales MD  01/31/24 0118

## 2024-01-31 NOTE — CONSULTS
Orthopedics   Eulalio Raphael 78 y.o. female MRN: [id number]  Unit/Bed#: AN CT SCAN      Chief Complaint:   Left shoulder pain s/p fall down stairs.     HPI:  78 y.o. female community ambulator with medical history inclusive of breast cancer s/p mastectomy complicated by left arm lymphedema, thyroid cancer s/p thyroidectomy, hypertension, diabetes, anxiety, who presents s/p fall down stairs yesterday evening. She reports she fell down roughly 13-15 stairs. At present, her only complaint is pain in the left shoulder, with radiations towards the left elbow. She endorses pain in the left chest wall as well. She also has pain in the face/nose. She has no numbness/tingling, just pain.   She has no pain in the right upper extremity or in the bilateral lower extremities.   She has been found to have a left humerus fracture and left clavicle fracture, for which orthopedics has been engaged.   She endorses +lymphedema through the left arm, managed by continued movement of the arm. At present has some swelling through the left hand, but feels that this has been exacerbated by sling placement. The sling was loosened slightly this AM, and the swelling on the dorsum of the hand has improved with digital range of motion.   She has two small abrasions over the shoulder sustained during fall.   Her daughter is present at time of consultation at bedside.     Review Of Systems:   Skin: as per HPI  Neuro: See HPI  Musculoskeletal: See HPI  14 point review of systems negative except as stated above     Past Medical History:   Past Medical History:   Diagnosis Date    Breast cancer (HCC)     Hypertension        Past Surgical History:   Past Surgical History:   Procedure Laterality Date    HYSTERECTOMY      MASTECTOMY MODIFIED RADICAL Bilateral     SPINE SURGERY      THYROIDECTOMY N/A 6/21/2023    Procedure: LEFT HEMITHYROIDECTOMY, SUBSTERNAL;  Surgeon: Leonel James MD;  Location: [facility];  Service: ENT       Family  History:  Family history reviewed and non-contributory  Family History   Problem Relation Age of Onset    Hypertension Father     Hypertension Sister     Hypertension Brother        Social History:  Social History     Socioeconomic History    Marital status:      Spouse name: None    Number of children: None    Years of education: None    Highest education level: None   Occupational History    None   Tobacco Use    Smoking status: Never     Passive exposure: Never    Smokeless tobacco: Never   Vaping Use    Vaping status: Never Used   Substance and Sexual Activity    Alcohol use: Yes     Alcohol/week: 1.0 standard drink of alcohol     Types: 1 Glasses of wine per week    Drug use: Never    Sexual activity: Not Currently   Other Topics Concern    None   Social History Narrative    None     Social Determinants of Health     Financial Resource Strain: Not on file   Food Insecurity: Not on file   Transportation Needs: Not on file   Physical Activity: Not on file   Stress: Not on file   Social Connections: Not on file   Intimate Partner Violence: Not on file   Housing Stability: Not on file       Allergies:   Allergies   Allergen Reactions    Ace Inhibitors Angioedema    Erythromycin Swelling           Labs:  0   Lab Value Date/Time    HCT 37.1 01/31/2024 0645    HCT 42.7 01/30/2024 1930    HCT 42.3 05/30/2023 1103    HGB 11.9 01/31/2024 0645    HGB 13.7 01/30/2024 1930    HGB 13.5 05/30/2023 1103    INR 0.96 01/31/2024 0645    WBC 6.79 01/31/2024 0645    WBC 9.82 01/30/2024 1930    WBC 5.99 05/30/2023 1103       Meds:    Current Facility-Administered Medications:     acetaminophen (TYLENOL) tablet 975 mg, 975 mg, Oral, Q8H MAGALI, Veronica Callejas MD, 975 mg at 01/31/24 0141    amLODIPine (NORVASC) tablet 10 mg, 10 mg, Oral, Daily, Veronica Callejas MD, 10 mg at 01/31/24 0909    enoxaparin (LOVENOX) subcutaneous injection 30 mg, 30 mg, Subcutaneous, Q12H, Veronica Callejas MD, 30 mg at 01/31/24 0142     HYDROmorphone HCl (DILAUDID) injection 0.2 mg, 0.2 mg, Intravenous, Q2H PRN, Veronica Callejas MD, 0.2 mg at 01/31/24 1100    insulin lispro (HumaLOG) 100 units/mL subcutaneous injection 1-5 Units, 1-5 Units, Subcutaneous, 4x Daily, 1 Units at 01/31/24 0148 **AND** Fingerstick Glucose (POCT), , , 4x Daily, Veronica Callejas MD    levothyroxine tablet 25 mcg, 25 mcg, Oral, Early Morning, Veronica Callejas MD, 25 mcg at 01/31/24 0726    lidocaine (LIDODERM) 5 % patch 1 patch, 1 patch, Topical, Daily, Veronica Callejas MD, 1 patch at 01/31/24 0909    multi-electrolyte (PLASMALYTE-A/ISOLYTE-S PH 7.4) IV solution, 75 mL/hr, Intravenous, Continuous, Bridger May PA-C    naloxone (NARCAN) 0.04 mg/mL syringe 0.04 mg, 0.04 mg, Intravenous, Q1MIN PRN, Veronica Callejas MD    ondansetron (ZOFRAN) injection 4 mg, 4 mg, Intravenous, Q8H PRN, Veronica Callejas MD    oxyCODONE (ROXICODONE) split tablet 2.5 mg, 2.5 mg, Oral, Q4H PRN **OR** oxyCODONE (ROXICODONE) IR tablet 5 mg, 5 mg, Oral, Q4H PRN, Veronica Callejas MD, 5 mg at 01/31/24 0727    PARoxetine (PAXIL) tablet 20 mg, 20 mg, Oral, Daily, Veronica Callejas MD, 20 mg at 01/31/24 0909    polyethylene glycol (MIRALAX) packet 17 g, 17 g, Oral, Daily, Veronica Callejas MD    senna (SENOKOT) tablet 17.2 mg, 2 tablet, Oral, Daily, Veronica Callejas MD, 17.2 mg at 01/31/24 0910    Current Outpatient Medications:     amLODIPine (NORVASC) 10 mg tablet, TAKE 1 TABLET BY MOUTH EVERY DAY, Disp: 90 tablet, Rfl: 1    levothyroxine 25 mcg tablet, TAKE 1 TABLET BY MOUTH EVERY DAY, Disp: 90 tablet, Rfl: 1    metFORMIN (GLUCOPHAGE-XR) 500 mg 24 hr tablet, Take 2 tablets (1,000 mg total) by mouth 2 (two) times a day with meals, Disp: 360 tablet, Rfl: 1    Multiple Vitamin (multivitamin) capsule, Take 1 capsule by mouth daily, Disp: , Rfl:     PARoxetine (PAXIL) 20 mg tablet, Take 1 tablet (20 mg total) by mouth daily, Disp: 90 tablet, Rfl: 1    Blood Culture:   No results found  "for: \"BLOODCX\"    Wound Culture:   No results found for: \"WOUNDCULT\"    Ins and Outs:  I/O last 24 hours:  In: 1000 [IV Piggyback:1000]  Out: -           Physical Exam:   /64   Pulse 60   Temp 97.7 °F (36.5 °C) (Oral)   Resp 18   SpO2 97%   Gen: No acute distress, resting comfortably in bed  HEENT: Eyes clear, moist mucus membranes, hearing intact  Respiratory: No audible wheezing or stridor  Cardiovascular: Well Perfused peripherally, 2+ distal pulse  Abdomen: nondistended, no peritoneal signs  Musculoskeletal: bilateral upper extremity  Skin: two small abrasions over the shoulder, superficial.   Left upper extremity:   Diffuse ttp along the left clavicle, into the left shoulder and upper arm. ROM of the left arm deferred in setting of known fracture. Sling in place to left upper extremity. Tender throughout upper arm towards elbow. No significant bony tenderness at elbow itself. Exam limited by sling placement. No ttp through forearm, wrist or hand on the left. +lymphedema present, most prominent in forearm and wrist/hand, particularly on the dorsum of the hand. Compressible swelling.   SILT m/r/u.   Motor intact ain/pin/m/r/u  Digits warm and well perfused.   Musculature soft and compressible.   No ttp over the right clavicle, shoulder, upper arm, elbow forearm or wrist. ROM full. Sensation intact. Digits warm and well perfused. Musculature soft.     Musculoskeletal: bilateral lower extremity  Skin intact.   No pain to palpation over the bilateral hips, femurs, knees, lower legs, feet or ankles.   ROM full.   SILT s/s/sp/dp/t.   +ankle dorsi/plantar flexion, EHL/FHL  2+ DP/PT pulse bilatearlly.   Musculature is soft and compressible, no pain with passive stretch bilaterally.   Leg lengths equal    Tertiary: no tenderness over all other joints/long bones as except already stated.    Radiology:   I personally reviewed the films.  Imaging reviewed and discussed with Dr. Kaufman.   CT left upper " extremity/CT chest/left forearm: highly comminuted proximal humeral fracture, left proximal clavicle fracture, clavicular tip fracture.   XRAYs hip/pelvis: no acute osseous abnormality.   XRAY left hand: no acute osseous abnormality.   _*_*_*_*_*_*_*_*_*_*_*_*_*_*_*_*_*_*_*_*_*_*_*_*_*_*_*_*_*_*_*_*_*_*_*_*_*_*_*_*_*    Assessment:  78 y.o.female s/p fall down stairs with highly comminuted proximal humeral fracture, as well as left clavicle fractures     Plan:   NWB to the left upper extremity in sling  Operative intervention planned for 2/1/2024  NPO after midnight  Pre operative abx ordered  Intra operative txa ordered  Consent on file.   BMI 22.90  Dispo: Ortho will follow  Case reviewed and discussed with Dr. Kaufman.     Beth Lucas PA-C

## 2024-01-31 NOTE — ANESTHESIA PREPROCEDURE EVALUATION
Procedure:  ARTHROPLASTY SHOULDER REVERSE, and all associated procedures (Left: Shoulder)  Mechanical fall down 15 steps    #Left proximal humeral fracture - procedure above  #Left proximal clavicular fracture  #Nasal bone fracture - no indication for surgical repair per OMFS    #Lymphedema of LUE    Lab Results   Component Value Date    HGBA1C 6.2 (H) 01/27/2023         Relevant Problems   CARDIO   (+) Essential hypertension      ENDO   (+) Acquired hypothyroidism   (+) Type 2 diabetes mellitus without complication, without long-term current use of insulin (HCC)      NEURO/PSYCH   (+) Generalized anxiety disorder   (+) Recurrent depression (HCC)      ETT Placement Date: 06/21/23; Placement Time: 1537; Mask Ventilation: Ventilated by mask (1); Technique: Video laryngoscopy, Stylet; Type: Cuffed, Oral (NIMS); Tube Size: 7 mm; Laryngoscope: [employer]; Blade Size: 3; Location: Oral; Grade View: 2a; Insertion Attempts: 1; Placement Verification: Auscultation, Symmetrical chest wall movement, End tidal CO2; Removal Date: 06/21/23; Removal Time: 1807     Lab Results   Component Value Date    WBC 6.79 01/31/2024    HGB 11.9 01/31/2024    HCT 37.1 01/31/2024    MCV 86 01/31/2024     01/31/2024       Chemistry        Component Value Date/Time    K 4.4 01/31/2024 0645     01/31/2024 0645    CO2 28 01/31/2024 0645    BUN 19 01/31/2024 0645    CREATININE 0.84 01/31/2024 0645        Component Value Date/Time    CALCIUM 9.6 01/31/2024 0645    ALKPHOS 91 01/30/2024 1930    AST 27 01/30/2024 1930    ALT 22 01/30/2024 1930            Physical Exam    Airway    Mallampati score: III  TM Distance: >3 FB  Neck ROM: full     Dental        Cardiovascular  Rhythm: regular, Rate: normal    Pulmonary   Breath sounds clear to auscultation    Other Findings  Intercisor Distance > 3cm    post-pubertal.      Anesthesia Plan  ASA Score- 2     Anesthesia Type- general with ASA Monitors.         Additional Monitors:     Airway Plan:  ETT.    Comment: Discussed benefits/risks of general anesthesia including possibility of mouth/throat pain, injury to lips/teeth, nausea/vomiting, and surgical pain along with more rare complications such as stroke, MI, pneumonia, aspiration, and injury to blood vessels. All questions answered.    Discussed nerve block to assist with post-operative analgesia. Discussed possibility of uncommon complications including permanent nerve injury, damage to blood vessels, infection local anesthetic toxicity, and nerve block failure. Patient understands and wishes to proceed.         Discussed nerve block to assist with post-operative analgesia. Discussed possibility of uncommon complications including permanent nerve injury, damage to blood vessels, infection local anesthetic toxicity, and nerve block failure. Patient understands and wishes to proceed.       .       Plan Factors-Exercise tolerance (METS): >4 METS.    Chart reviewed. EKG reviewed.  Existing labs reviewed.                   Induction- intravenous.    Postoperative Plan- Plan for postoperative opioid use. Planned trial extubation    Informed Consent- Anesthetic plan and risks discussed with patient.  I personally reviewed this patient with the CRNA. Discussed and agreed on the Anesthesia Plan with the CRNA..

## 2024-01-31 NOTE — CONSULTS
Consultation - Geriatric Medicine   Eulalio Raphael 78 y.o. female MRN: [id number]  Unit/Bed#: ED-29 Encounter: [id number]      Assessment/Plan   Fall  [place] fall precautions   Assess patient frequently for physical needs, encourage use of assistant devices as needed and directed by PT/OT  Identify cognitive and physical deficits and behaviors that affect risk of falls  Consider moving patient closer to nursing station to monitor more closely for impulsive behavior which may increase risk of falls  Educate patient/family on patient safety including physical limitations and importance of using call bell for assistance   Modify environment to reduce risk of injury including disconnecting from pole when not in use, ensuring adequate lighting in room and restroom, ensuring that path to restroom is clear and free of trip hazards  PT/OT consulted to assist with strengthening/mobility and assist with discharge planning to appropriate level of care    Left proximal clavicle fracture  S/p fall  CT upper extremity showed clavicular tip fracture  Orthopedics was consulted  Patient currently in sling  Multimodal pain management including geriatric pain protocol  PT/OT consult  Management per orthopedics and trauma    Left proximal humerus fracture  S/p fall  CT upper extremity showed highly comminuted proximal humeral head fracture  Orthopedics consult pending  Patient currently in sling  Multimodal pain regimen: Geriatric pain protocol  PT/OT consult  Management per orthopedics and trauma    Nasal bone fracture  CT head showed suspected nondisplaced left nasal bone fracture with overlying soft tissue swelling  OMFS was consulted-consult pending    Hypertension  Blood pressure this morning 153/70  Blood pressures have trended between 134/63 to 168/74  Currently on amlodipine 10 mg daily  Avoid hypotension  Management per kartikamkiel    Hypothyroidism  Most recent TSH 1.501  Currently on levothyroxine 25 mcg daily  Follow-up  outpatient with PCP    Type 2 diabetes  Most recent A1c 6.2 from 01/27/23  Takes metformin at home- currently on hold  Currently on SSI  Encourage diabetic diet  Avoid hypothyroidism  Management per primary    Anxiety/depression  Patient reports chronic anxiety  Managed on Paxil 20 mg daily  Patient reports her anxiety is well-controlled on this  Continue to provide emotional support    Constipation  Patient reports some intermittent constipation at home  Last BM was prior to hospitalization  Is currently on Senokot 2 tablets daily, MiraLAX daily  Encourage adequate p.o. Hydration  Encourage prune juice as tolerated    Vision impairment  Patient does have vision impairment  Wears glasses at baseline   Recommend use of corrective lenses at all appropriate times  Encourage adequate lighting and encourage use of assistance with ambulation  Keep personal belongings close to avoid reaching  Encourage appropriate footwear at all times  Recommend large font for printed materials provided to patient    Dentition/appetite  Patient reports wearing upper dentures  Reports her appetite and weight have been stable  Patient did reports she lost some weight when going through cancer treatment a couple years ago, but weight has since been stable  Patient currently n.p.o.  Consider arising supplement such as Glucerna    Cognitive screening  Patient is alert oriented x4  No cognitive screening on file  No formal diagnosis of dementia or cognitive primary  Would recommend formal cognitive testing once medically stable  No recent vitamin B12, TSH, vitamin D levels on file-recommend checking  CT head showed- PARENCHYMA: Decreased attenuation is noted in periventricular and subcortical white matter demonstrating an appearance that is statistically most likely to represent moderate microangiopathic change. This appears slightly more pronounced than the prior  study from a year ago.No CT signs of acute infarction.  No intracranial mass,  mass effect or midline shift.  No acute parenchymal hemorrhage.    Most recent EKG showed QTc of 408 which is stable  At risk for delirium due to hospitalization, medications, sleep disturbance, acute pain  Recommend delirium precautions  Maintain sleep-wake cycle, avoid nighttime interruptions  minimize overnight interruptions, group overnight vitals/labs/nursing checks as possible  dim lights, close blinds and turn off tv to minimize stimulation and encourage sleep environment in evenings  Provide adequate pain control  Avoid urinary retention and constipation  Provide frequent and early mobilization  Provide frequent redirection and reorientation as needed  Avoid medications that may worsen or precipitate delirium such as tramadol, benzodiazepines, anticholinergics, and Benadryl  Redirect unwanted behaviors as first-line therapy, avoid physical restraints as able to  Continue to monitor    Frailty  Clinical Frail Scale: 6 living with moderate frailty  Total protein 6.9 and albumin 3.7  Encourage adequate hydration and nutrition, including protein in meals  OOB as tolerated  PT/OT consulted  Encourage early and frequent mobilization    Ambulatory dysfunction  At a baseline ambulates without AD  PT/OT following  Fall precautions  Out of bed as tolerated  Encourage early and frequent mobilization  Encourage adequate hydration and nutrition  Provide adequate pain management   Goal is undetermined   Continue with PT/OT for continued strength and balance training     Home medication review  Amlodipine 10mg daily  Paroxetine 20mg daily  Levothyroxine 25mxg daily  Metformin 1000mg bid    Personally confirmed with [facility] [id number]     History of Present Illness   Physician Requesting Consult: Júnior Bond DO  Reason for Consult / Principal Problem: Closed fracture of nasal fracture positive left clavicle closed displaced fracture of surgical neck of left humerus, depression, anxiety disorder  Hx and PE limited by:  None  Additional history obtained from: Chart review and patient evaluation      HPI: Eulalio Raphael is a 78 y.o. year old female who presents with history of hypertension, type 2 diabetes, hypothyroidism, anxiety, breast cancer and papillary thyroid cancer in remission.  She presented to the ER after she slipped and fell down about 15 stairs.  Trauma workup revealed left proximal clavicle fracture, left proximal humerus fracture, and nasal bone fracture.    Patient lives at home with her daughter and grandchildren in a two-story home.  She does not use any she denies any other pending for bathing and dressing.  There are no grab bars or shower chairs in the bathroom.  She is continent of bowel and bladder.  Patient shares the cooking and cleaning with her daughter and grandchildren.  The patient manages her own medications.  Her daughter manages the finances.  She wears eyeglasses and upper dentures, no hearing aids.  She denies any issues with insomnia and depression.  Reports chronic issues with anxiety for which she takes Paxil and anxiety is well-controlled.  She reports no recent appetite loss or weight loss.  She does not drive, her family to get to and from any appointments she may have.  Both patient and her daughter reports some mild forgetfulness and trouble remembering things.  The daughter reports that her long-term memory is very good, but does have some trouble remembering short-term stuff.  No significant decline recently though.  History provided by both patient and her daughter who is bedside.    Upon exam patient was lying in the stretcher, resting.  She appeared comfortable and was in no acute distress.  She reported pain at an 8 out of 10 in her left arm.  She reports feeling tired, but is currently NPO.  She had some trouble sleeping overnight.    Inpatient consult to Gerontology  Consult performed by: OMER Muro  Consult ordered by: Veronica Callejas MD          Review of Systems    Constitutional:  Negative for activity change, appetite change, chills, fatigue and fever.   HENT:  Negative for ear pain, sore throat and trouble swallowing.    Eyes:  Negative for visual disturbance.   Respiratory:  Negative for cough and shortness of breath.    Cardiovascular:  Negative for chest pain and palpitations.   Gastrointestinal:  Positive for constipation. Negative for abdominal pain, diarrhea, nausea and vomiting.   Genitourinary:  Negative for difficulty urinating, dysuria and hematuria.   Musculoskeletal:  Positive for arthralgias and gait problem. Negative for back pain.   Skin:  Negative for color change and rash.   Neurological:  Positive for weakness. Negative for dizziness, light-headedness and headaches.   Psychiatric/Behavioral:  Negative for confusion, dysphoric mood and sleep disturbance. The patient is nervous/anxious.        Historical Information   Past Medical History:   Diagnosis Date    Breast cancer (HCC)     Hypertension      Past Surgical History:   Procedure Laterality Date    HYSTERECTOMY      MASTECTOMY MODIFIED RADICAL Bilateral     SPINE SURGERY      THYROIDECTOMY N/A 6/21/2023    Procedure: LEFT HEMITHYROIDECTOMY, SUBSTERNAL;  Surgeon: Leonel James MD;  Location: [facility];  Service: ENT     Social History   Social History     Substance and Sexual Activity   Alcohol Use Yes    Alcohol/week: 1.0 standard drink of alcohol    Types: 1 Glasses of wine per week     Social History     Substance and Sexual Activity   Drug Use Never     Social History     Tobacco Use   Smoking Status Never    Passive exposure: Never   Smokeless Tobacco Never     Family History:   Family History   Problem Relation Age of Onset    Hypertension Father     Hypertension Sister     Hypertension Brother        Meds/Allergies   all current active meds have been reviewed    Allergies   Allergen Reactions    Ace Inhibitors Angioedema    Erythromycin Swelling       Objective     Intake/Output  Summary (Last 24 hours) at 1/31/2024 1002  Last data filed at 1/30/2024 2029  Gross per 24 hour   Intake 1000 ml   Output --   Net 1000 ml     Invasive Devices       Peripheral Intravenous Line  Duration             Peripheral IV 01/30/24 Right Antecubital 1 day                    Physical Exam  Vitals reviewed.   Constitutional:       General: She is not in acute distress.     Appearance: She is well-developed. She is not ill-appearing.   HENT:      Head: Normocephalic and atraumatic.   Cardiovascular:      Rate and Rhythm: Normal rate and regular rhythm.      Heart sounds: No murmur heard.     Comments: Occasional bradycardia  Pulmonary:      Effort: Pulmonary effort is normal. No respiratory distress.      Breath sounds: Normal breath sounds.   Abdominal:      General: Bowel sounds are normal. There is no distension.      Palpations: Abdomen is soft.      Tenderness: There is no abdominal tenderness.   Musculoskeletal:         General: Tenderness and signs of injury present. No swelling.      Right lower leg: No edema.      Left lower leg: No edema.      Comments: Sling left arm   Skin:     General: Skin is warm and dry.   Neurological:      General: No focal deficit present.      Mental Status: She is alert and oriented to person, place, and time. Mental status is at baseline.      Cranial Nerves: No cranial nerve deficit.      Motor: Weakness present.      Gait: Gait normal.   Psychiatric:         Mood and Affect: Mood normal.         Lab Results:   I have personally reviewed pertinent lab results including the following:  -CBC, BMP    I have personally reviewed the following imaging study reports in PACS:  -X-ray hip pelvis, x-ray shoulder, x-ray humerus, x-ray forearm, x-ray chest, CT head, CT spine, CT chest, CT upper extremity      Therapies:   PT: consulted  OT: consulted    VTE Prophylaxis: Sequential compression device (Venodyne)  and Enoxaparin (Lovenox)    Code Status: Level 1 - Full Code  Advance  "Directive and Living Will:    No  Power of :  No  POLST:  No    Family and Social Support:   Living arrangements: Lives at home with daughter and grandchildren  Support system: Children and other family members  Type of residence: Private  Assistance needed: No    Goals of Care: Undetermined at this time    Please note:  Voice-recognition software may have been used in the preparation of this document.  Occasional wrong word or \"sound-alike\" substitutions may have occurred due to the inherent limitations of voice recognition software.  Interpretation should be guided by context.    "

## 2024-01-31 NOTE — H&P
H&P - Trauma   Eulalio Raphael 78 y.o. female MRN: [id number]  Unit/Bed#: ED-29 Encounter: [id number]    Trauma Alert: Evaluation; trauma team notified at 2337 via text   Model of Arrival: Ambulance    Trauma Team: Attending Varun and Residents Júnior  Consultants:     Orthopedics: routine consult; Epic consult order placed;      Oral Maxillofacial: routine consult; Epic consult order placed;     Assessment/Plan   Active Problems / Assessment:   Left proximal clavicle fracture  Left proximal humerus fracture  Nasal bone fracture     Plan:   Admission to trauma service  CT left shoulder   Pain control  NPO at midnight  Ortho consult  OMFS consult  LUE in sling  Q4h NV checks to LUE  Pre-op labs  PT/OT    History of Present Illness     Chief Complaint: left shoulder pain  Mechanism:Fall     HPI:    Eulalio Raphael is a 78 y.o. female with hx of HTN, DMT2, hypothyroidism, FELY, lymphedema of left arm, breast cancer in remission, papillary thyroid cancer in remission, depression who presents via EMS from home after reported fall down 15 steps at home. Patient says she was getting ready to go down the steps when she lost her footing and slipped, tumbling down 15 steps. Family found her at the bottom of the steps on hands and knees, with blood coming out of her nose. She denies any LOC. She denies any preceding chest pain, sob, lightheadedness prior to fall. She is not on any blood thinners or ASA. In the ED, was found to have left proximal clavicle fracture and left proximal humeral fracture with displacement, as well as nasal bone fractures. Reviewed current meds with patient and daughter.     Review of Systems   Constitutional: Negative.    HENT: Negative.     Eyes: Negative.    Respiratory: Negative.     Cardiovascular: Negative.    Gastrointestinal: Negative.    Endocrine: Negative.    Genitourinary: Negative.    Musculoskeletal:         Left shoulder and clavicular pain   Skin: Negative.    Allergic/Immunologic:  Negative.    Neurological: Negative.    Hematological: Negative.    Psychiatric/Behavioral: Negative.       12-point, complete review of systems was reviewed and negative except as stated above.     Historical Information     Past Medical History:   Diagnosis Date    Breast cancer (HCC)     Hypertension      Past Surgical History:   Procedure Laterality Date    HYSTERECTOMY      MASTECTOMY MODIFIED RADICAL Bilateral     SPINE SURGERY      THYROIDECTOMY N/A 6/21/2023    Procedure: LEFT HEMITHYROIDECTOMY, SUBSTERNAL;  Surgeon: Leonel James MD;  Location: [facility];  Service: ENT        Social History     Tobacco Use    Smoking status: Never     Passive exposure: Never    Smokeless tobacco: Never   Vaping Use    Vaping status: Never Used   Substance Use Topics    Alcohol use: Yes     Alcohol/week: 1.0 standard drink of alcohol     Types: 1 Glasses of wine per week    Drug use: Never     Immunization History   Administered Date(s) Administered    Influenza, high dose seasonal 0.7 mL 01/20/2023     Last Tetanus: unknown  Family History: Non-contributory    1. Before the illness or injury that brought you to the Emergency, did you need someone to help you on a regular basis? 0=No   2. Since the illness or injury that brought you to the Emergency, have you needed more help than usual to take care of yourself? 0=No   3. Have you been hospitalized for one or more nights during the past 6 months (excluding a stay in the Emergency Department)? 0=No   4. In general, do you see well? 0=Yes   5. In general, do you have serious problems with your memory? 0=No   6. Do you take more than three different medications everyday? 1=Yes   TOTAL   1     Did you order a geriatric consult if the score was 2 or greater?: yes     Meds/Allergies   all current active meds have been reviewed     Allergies   Allergen Reactions    Ace Inhibitors Angioedema    Erythromycin Swelling       Objective   Initial Vitals:   Temperature: 97.7 °F  (36.5 °C) (01/30/24 1825)  Pulse: (!) 44 (01/30/24 1825)  Respirations: 16 (01/30/24 1825)  Blood Pressure: 168/74 (01/30/24 1826)    Primary Survey:   Airway:        Status: patent;        Pre-hospital Interventions: none        Hospital Interventions: none  Breathing:        Pre-hospital Interventions: none       Effort: normal       Right breath sounds: normal       Left breath sounds: normal  Circulation:        Rhythm: regular       Rate: regular   Right Pulses Left Pulses    R radial: 2+  R femoral: 2+  R pedal: 2+     L radial: 2+  L femoral: 2+  L pedal: 2+       Disability:        GCS: Eye: 4; Verbal: 5 Motor: 6 Total: 15       Right Pupil: round;        Left Pupil:  round;     R Motor Strength L Motor Strength    R : 5/5  R dorsiflex: 5/5  R plantarflex: 5/5 L : 5/5  L dorsiflex: 5/5  L plantarflex: 5/5          Exposure:           Secondary Survey:  Physical Exam  Constitutional:       General: She is not in acute distress.     Appearance: She is not ill-appearing, toxic-appearing or diaphoretic.   HENT:      Head: Normocephalic. Contusion present. No raccoon eyes, abrasion, right periorbital erythema or left periorbital erythema.        Nose: Signs of injury (nasal bone fracture) and nasal tenderness present. No congestion or rhinorrhea.      Right Nostril: Epistaxis (dried blood, no active bleeding) present. No septal hematoma or occlusion.      Left Nostril: Epistaxis (dried blood, no active bleeding) present. No septal hematoma or occlusion.   Eyes:      Extraocular Movements: Extraocular movements intact.   Cardiovascular:      Rate and Rhythm: Normal rate and regular rhythm.      Pulses: Normal pulses.      Heart sounds: Normal heart sounds. No murmur heard.  Pulmonary:      Effort: Pulmonary effort is normal. No respiratory distress.      Breath sounds: No stridor. No wheezing, rhonchi or rales.   Chest:      Chest wall: No tenderness.       Abdominal:      General: There is no distension.       Tenderness: There is no abdominal tenderness. There is no guarding or rebound.   Musculoskeletal:         General: Tenderness and signs of injury (LUE in sling) present.        Arms:       Cervical back: Normal range of motion and neck supple. No rigidity or tenderness.   Skin:     General: Skin is warm and dry.      Coloration: Skin is not jaundiced or pale.      Findings: No bruising, erythema, lesion or rash.   Neurological:      General: No focal deficit present.      Mental Status: She is alert and oriented to person, place, and time.         Invasive Devices       Peripheral Intravenous Line  Duration             Peripheral IV 01/30/24 Right Antecubital 1 day                  Lab Results: I have personally reviewed all pertinent laboratory/test results 01/31/24 and in the preceding 24 hours.  Recent Labs     01/30/24  1930   WBC 9.82   HGB 13.7   HCT 42.7      SODIUM 137   K 3.8      CO2 25   BUN 15   CREATININE 0.90   GLUC 160*   AST 27   ALT 22   ALB 3.7   TBILI 0.55   ALKPHOS 91       Imaging Results: I have personally reviewed pertinent images saved in PACS. CT scan findings (and other pertinent positive findings on images) were discussed with radiology. My interpretation of the images/reports are as follows:  Chest Xray(s): negative for acute findings   FAST exam(s): N/A   CT Scan(s): positive for acute findings: Suspected nondisplaced left nasal bone fracture with overlying soft tissue swelling . There is hyperdense material layering within the left maxillary sinus which could represent hemorrhage within the sinus, sequela of an occult nondisplaced fracture. Incompletely visualized soft tissue mass in the right upper thorax, possibly extrapleural for which a dedicated CT chest with contrast is recommended.      Additional Xray(s): positive for acute findings: Comminuted fracture involving greater and lesser tuberosities of the humerus as well as of the left humeral neck. There is  significant anteromedial displacement and lateral angulation of the distal fracture fragment distal to the humeral neck. Additional nondisplaced fracture at the proximal one third of the clavicle.     Other Studies: CT left shoulder pending formal real    Code Status: Level 1 - Full Code  Advance Directive and Living Will:      Power of :    POLST:    I have spent 45 minutes with Patient and family today in which greater than 50% of this time was spent in counseling/coordination of care regarding Diagnostic results, Prognosis, Risks and benefits of tx options, Instructions for management, Patient and family education, Importance of tx compliance, Risk factor reductions, Impressions, Counseling / Coordination of care, Documenting in the medical record, Reviewing / ordering tests, medicine, procedures  , Obtaining or reviewing history  , and Communicating with other healthcare professionals .

## 2024-02-01 ENCOUNTER — ANESTHESIA (INPATIENT)
Dept: PERIOP | Facility: HOSPITAL | Age: 79
DRG: 483 | End: 2024-02-01
Payer: COMMERCIAL

## 2024-02-01 ENCOUNTER — APPOINTMENT (INPATIENT)
Dept: RADIOLOGY | Facility: HOSPITAL | Age: 79
DRG: 483 | End: 2024-02-01
Payer: COMMERCIAL

## 2024-02-01 LAB
ANION GAP SERPL CALCULATED.3IONS-SCNC: 5 MMOL/L
ANION GAP SERPL CALCULATED.3IONS-SCNC: 7 MMOL/L
BASOPHILS # BLD AUTO: 0.01 THOUSANDS/ÂΜL (ref 0–0.1)
BASOPHILS # BLD AUTO: 0.01 THOUSANDS/ÂΜL (ref 0–0.1)
BASOPHILS NFR BLD AUTO: 0 % (ref 0–1)
BASOPHILS NFR BLD AUTO: 0 % (ref 0–1)
BUN SERPL-MCNC: 15 MG/DL (ref 5–25)
BUN SERPL-MCNC: 16 MG/DL (ref 5–25)
CALCIUM SERPL-MCNC: 8.9 MG/DL (ref 8.4–10.2)
CALCIUM SERPL-MCNC: 8.9 MG/DL (ref 8.4–10.2)
CHLORIDE SERPL-SCNC: 104 MMOL/L (ref 96–108)
CHLORIDE SERPL-SCNC: 105 MMOL/L (ref 96–108)
CO2 SERPL-SCNC: 24 MMOL/L (ref 21–32)
CO2 SERPL-SCNC: 27 MMOL/L (ref 21–32)
CREAT SERPL-MCNC: 0.64 MG/DL (ref 0.6–1.3)
CREAT SERPL-MCNC: 0.73 MG/DL (ref 0.6–1.3)
EOSINOPHIL # BLD AUTO: 0 THOUSAND/ÂΜL (ref 0–0.61)
EOSINOPHIL # BLD AUTO: 0.02 THOUSAND/ÂΜL (ref 0–0.61)
EOSINOPHIL NFR BLD AUTO: 0 % (ref 0–6)
EOSINOPHIL NFR BLD AUTO: 0 % (ref 0–6)
ERYTHROCYTE [DISTWIDTH] IN BLOOD BY AUTOMATED COUNT: 14.6 % (ref 11.6–15.1)
ERYTHROCYTE [DISTWIDTH] IN BLOOD BY AUTOMATED COUNT: 14.8 % (ref 11.6–15.1)
GFR SERPL CREATININE-BSD FRML MDRD: 79 ML/MIN/1.73SQ M
GFR SERPL CREATININE-BSD FRML MDRD: 86 ML/MIN/1.73SQ M
GLUCOSE SERPL-MCNC: 136 MG/DL (ref 65–140)
GLUCOSE SERPL-MCNC: 165 MG/DL (ref 65–140)
GLUCOSE SERPL-MCNC: 171 MG/DL (ref 65–140)
GLUCOSE SERPL-MCNC: 183 MG/DL (ref 65–140)
GLUCOSE SERPL-MCNC: 200 MG/DL (ref 65–140)
GLUCOSE SERPL-MCNC: 205 MG/DL (ref 65–140)
GLUCOSE SERPL-MCNC: 245 MG/DL (ref 65–140)
HCT VFR BLD AUTO: 27.6 % (ref 34.8–46.1)
HCT VFR BLD AUTO: 30 % (ref 34.8–46.1)
HGB BLD-MCNC: 8.8 G/DL (ref 11.5–15.4)
HGB BLD-MCNC: 9.7 G/DL (ref 11.5–15.4)
IMM GRANULOCYTES # BLD AUTO: 0.02 THOUSAND/UL (ref 0–0.2)
IMM GRANULOCYTES # BLD AUTO: 0.05 THOUSAND/UL (ref 0–0.2)
IMM GRANULOCYTES NFR BLD AUTO: 0 % (ref 0–2)
IMM GRANULOCYTES NFR BLD AUTO: 1 % (ref 0–2)
LYMPHOCYTES # BLD AUTO: 0.61 THOUSANDS/ÂΜL (ref 0.6–4.47)
LYMPHOCYTES # BLD AUTO: 1.19 THOUSANDS/ÂΜL (ref 0.6–4.47)
LYMPHOCYTES NFR BLD AUTO: 15 % (ref 14–44)
LYMPHOCYTES NFR BLD AUTO: 6 % (ref 14–44)
MAGNESIUM SERPL-MCNC: 1.4 MG/DL (ref 1.9–2.7)
MCH RBC QN AUTO: 27.4 PG (ref 26.8–34.3)
MCH RBC QN AUTO: 27.5 PG (ref 26.8–34.3)
MCHC RBC AUTO-ENTMCNC: 31.9 G/DL (ref 31.4–37.4)
MCHC RBC AUTO-ENTMCNC: 32.3 G/DL (ref 31.4–37.4)
MCV RBC AUTO: 85 FL (ref 82–98)
MCV RBC AUTO: 86 FL (ref 82–98)
MONOCYTES # BLD AUTO: 0.49 THOUSAND/ÂΜL (ref 0.17–1.22)
MONOCYTES # BLD AUTO: 0.9 THOUSAND/ÂΜL (ref 0.17–1.22)
MONOCYTES NFR BLD AUTO: 12 % (ref 4–12)
MONOCYTES NFR BLD AUTO: 5 % (ref 4–12)
NEUTROPHILS # BLD AUTO: 5.57 THOUSANDS/ÂΜL (ref 1.85–7.62)
NEUTROPHILS # BLD AUTO: 9.16 THOUSANDS/ÂΜL (ref 1.85–7.62)
NEUTS SEG NFR BLD AUTO: 73 % (ref 43–75)
NEUTS SEG NFR BLD AUTO: 88 % (ref 43–75)
NRBC BLD AUTO-RTO: 0 /100 WBCS
NRBC BLD AUTO-RTO: 0 /100 WBCS
PHOSPHATE SERPL-MCNC: 2.5 MG/DL (ref 2.3–4.1)
PLATELET # BLD AUTO: 144 THOUSANDS/UL (ref 149–390)
PLATELET # BLD AUTO: 150 THOUSANDS/UL (ref 149–390)
PMV BLD AUTO: 10.2 FL (ref 8.9–12.7)
PMV BLD AUTO: 10.5 FL (ref 8.9–12.7)
POTASSIUM SERPL-SCNC: 3.9 MMOL/L (ref 3.5–5.3)
POTASSIUM SERPL-SCNC: 4.4 MMOL/L (ref 3.5–5.3)
RBC # BLD AUTO: 3.2 MILLION/UL (ref 3.81–5.12)
RBC # BLD AUTO: 3.54 MILLION/UL (ref 3.81–5.12)
SODIUM SERPL-SCNC: 136 MMOL/L (ref 135–147)
SODIUM SERPL-SCNC: 136 MMOL/L (ref 135–147)
WBC # BLD AUTO: 10.32 THOUSAND/UL (ref 4.31–10.16)
WBC # BLD AUTO: 7.71 THOUSAND/UL (ref 4.31–10.16)

## 2024-02-01 PROCEDURE — C1776 JOINT DEVICE (IMPLANTABLE): HCPCS | Performed by: STUDENT IN AN ORGANIZED HEALTH CARE EDUCATION/TRAINING PROGRAM

## 2024-02-01 PROCEDURE — C1713 ANCHOR/SCREW BN/BN,TIS/BN: HCPCS | Performed by: STUDENT IN AN ORGANIZED HEALTH CARE EDUCATION/TRAINING PROGRAM

## 2024-02-01 PROCEDURE — 23472 RECONSTRUCT SHOULDER JOINT: CPT | Performed by: STUDENT IN AN ORGANIZED HEALTH CARE EDUCATION/TRAINING PROGRAM

## 2024-02-01 PROCEDURE — 82948 REAGENT STRIP/BLOOD GLUCOSE: CPT

## 2024-02-01 PROCEDURE — 0RRK00Z REPLACEMENT OF LEFT SHOULDER JOINT WITH REVERSE BALL AND SOCKET SYNTHETIC SUBSTITUTE, OPEN APPROACH: ICD-10-PCS | Performed by: STUDENT IN AN ORGANIZED HEALTH CARE EDUCATION/TRAINING PROGRAM

## 2024-02-01 PROCEDURE — C9290 INJ, BUPIVACAINE LIPOSOME: HCPCS | Performed by: STUDENT IN AN ORGANIZED HEALTH CARE EDUCATION/TRAINING PROGRAM

## 2024-02-01 PROCEDURE — 83735 ASSAY OF MAGNESIUM: CPT | Performed by: PHYSICIAN ASSISTANT

## 2024-02-01 PROCEDURE — 85025 COMPLETE CBC W/AUTO DIFF WBC: CPT | Performed by: PHYSICIAN ASSISTANT

## 2024-02-01 PROCEDURE — NC001 PR NO CHARGE

## 2024-02-01 PROCEDURE — 80048 BASIC METABOLIC PNL TOTAL CA: CPT | Performed by: NURSE PRACTITIONER

## 2024-02-01 PROCEDURE — 80048 BASIC METABOLIC PNL TOTAL CA: CPT | Performed by: PHYSICIAN ASSISTANT

## 2024-02-01 PROCEDURE — 73020 X-RAY EXAM OF SHOULDER: CPT

## 2024-02-01 PROCEDURE — 99232 SBSQ HOSP IP/OBS MODERATE 35: CPT | Performed by: SURGERY

## 2024-02-01 PROCEDURE — 85025 COMPLETE CBC W/AUTO DIFF WBC: CPT | Performed by: NURSE PRACTITIONER

## 2024-02-01 PROCEDURE — 0PUG07Z SUPPLEMENT LEFT HUMERAL SHAFT WITH AUTOLOGOUS TISSUE SUBSTITUTE, OPEN APPROACH: ICD-10-PCS | Performed by: STUDENT IN AN ORGANIZED HEALTH CARE EDUCATION/TRAINING PROGRAM

## 2024-02-01 PROCEDURE — 0PSD04Z REPOSITION LEFT HUMERAL HEAD WITH INTERNAL FIXATION DEVICE, OPEN APPROACH: ICD-10-PCS | Performed by: STUDENT IN AN ORGANIZED HEALTH CARE EDUCATION/TRAINING PROGRAM

## 2024-02-01 PROCEDURE — 0LS40ZZ REPOSITION LEFT UPPER ARM TENDON, OPEN APPROACH: ICD-10-PCS | Performed by: STUDENT IN AN ORGANIZED HEALTH CARE EDUCATION/TRAINING PROGRAM

## 2024-02-01 PROCEDURE — 84100 ASSAY OF PHOSPHORUS: CPT | Performed by: PHYSICIAN ASSISTANT

## 2024-02-01 PROCEDURE — 99232 SBSQ HOSP IP/OBS MODERATE 35: CPT | Performed by: NURSE PRACTITIONER

## 2024-02-01 DEVICE — IMPLANTABLE DEVICE
Type: IMPLANTABLE DEVICE | Site: SHOULDER | Status: FUNCTIONAL
Brand: CEMENT RESTRICTOR

## 2024-02-01 DEVICE — IMPLANTABLE DEVICE: Type: IMPLANTABLE DEVICE | Site: SHOULDER | Status: FUNCTIONAL

## 2024-02-01 DEVICE — SCREW CENTRAL 6.5 X 30MM: Type: IMPLANTABLE DEVICE | Site: SHOULDER | Status: FUNCTIONAL

## 2024-02-01 DEVICE — IMPLANTABLE DEVICE
Type: IMPLANTABLE DEVICE | Site: SHOULDER | Status: FUNCTIONAL
Brand: TORNIER PERFORM™ REVERSED

## 2024-02-01 DEVICE — SMARTSET HIGH PERFORMANCE MV MEDIUM VISCOSITY BONE CEMENT 40G
Type: IMPLANTABLE DEVICE | Site: SHOULDER | Status: FUNCTIONAL
Brand: SMARTSET

## 2024-02-01 DEVICE — BASEPLATE STD 25MM: Type: IMPLANTABLE DEVICE | Site: SHOULDER | Status: FUNCTIONAL

## 2024-02-01 DEVICE — SCREW PERIPHERAL 5 X 22MM: Type: IMPLANTABLE DEVICE | Site: SHOULDER | Status: FUNCTIONAL

## 2024-02-01 DEVICE — SCREW PERIPHERAL 5 X 34MM: Type: IMPLANTABLE DEVICE | Site: SHOULDER | Status: FUNCTIONAL

## 2024-02-01 RX ORDER — CEFAZOLIN SODIUM 2 G/50ML
2000 SOLUTION INTRAVENOUS EVERY 8 HOURS
Status: COMPLETED | OUTPATIENT
Start: 2024-02-01 | End: 2024-02-02

## 2024-02-01 RX ORDER — CEFAZOLIN SODIUM 2 G/50ML
2000 SOLUTION INTRAVENOUS
Status: COMPLETED | OUTPATIENT
Start: 2024-02-01 | End: 2024-02-01

## 2024-02-01 RX ORDER — HYDROMORPHONE HCL IN WATER/PF 6 MG/30 ML
0.2 PATIENT CONTROLLED ANALGESIA SYRINGE INTRAVENOUS
Status: DISCONTINUED | OUTPATIENT
Start: 2024-02-01 | End: 2024-02-01 | Stop reason: HOSPADM

## 2024-02-01 RX ORDER — FENTANYL CITRATE 50 UG/ML
INJECTION, SOLUTION INTRAMUSCULAR; INTRAVENOUS AS NEEDED
Status: DISCONTINUED | OUTPATIENT
Start: 2024-02-01 | End: 2024-02-01

## 2024-02-01 RX ORDER — TRANEXAMIC ACID 10 MG/ML
INJECTION, SOLUTION INTRAVENOUS AS NEEDED
Status: DISCONTINUED | OUTPATIENT
Start: 2024-02-01 | End: 2024-02-01

## 2024-02-01 RX ORDER — EPHEDRINE SULFATE 50 MG/ML
INJECTION INTRAVENOUS AS NEEDED
Status: DISCONTINUED | OUTPATIENT
Start: 2024-02-01 | End: 2024-02-01

## 2024-02-01 RX ORDER — ROCURONIUM BROMIDE 10 MG/ML
INJECTION, SOLUTION INTRAVENOUS AS NEEDED
Status: DISCONTINUED | OUTPATIENT
Start: 2024-02-01 | End: 2024-02-01

## 2024-02-01 RX ORDER — MAGNESIUM HYDROXIDE 1200 MG/15ML
LIQUID ORAL AS NEEDED
Status: DISCONTINUED | OUTPATIENT
Start: 2024-02-01 | End: 2024-02-01 | Stop reason: HOSPADM

## 2024-02-01 RX ORDER — PROPOFOL 10 MG/ML
INJECTION, EMULSION INTRAVENOUS AS NEEDED
Status: DISCONTINUED | OUTPATIENT
Start: 2024-02-01 | End: 2024-02-01

## 2024-02-01 RX ORDER — FENTANYL CITRATE/PF 50 MCG/ML
25 SYRINGE (ML) INJECTION
Status: DISCONTINUED | OUTPATIENT
Start: 2024-02-01 | End: 2024-02-01 | Stop reason: HOSPADM

## 2024-02-01 RX ORDER — METOCLOPRAMIDE HYDROCHLORIDE 5 MG/ML
10 INJECTION INTRAMUSCULAR; INTRAVENOUS ONCE AS NEEDED
Status: DISCONTINUED | OUTPATIENT
Start: 2024-02-01 | End: 2024-02-01 | Stop reason: HOSPADM

## 2024-02-01 RX ORDER — SODIUM CHLORIDE, SODIUM LACTATE, POTASSIUM CHLORIDE, CALCIUM CHLORIDE 600; 310; 30; 20 MG/100ML; MG/100ML; MG/100ML; MG/100ML
50 INJECTION, SOLUTION INTRAVENOUS CONTINUOUS
Status: CANCELLED | OUTPATIENT
Start: 2024-02-01

## 2024-02-01 RX ORDER — ONDANSETRON 2 MG/ML
4 INJECTION INTRAMUSCULAR; INTRAVENOUS ONCE AS NEEDED
Status: DISCONTINUED | OUTPATIENT
Start: 2024-02-01 | End: 2024-02-01 | Stop reason: HOSPADM

## 2024-02-01 RX ORDER — BUPIVACAINE HYDROCHLORIDE 5 MG/ML
INJECTION, SOLUTION EPIDURAL; INTRACAUDAL
Status: COMPLETED | OUTPATIENT
Start: 2024-02-01 | End: 2024-02-01

## 2024-02-01 RX ORDER — VANCOMYCIN HYDROCHLORIDE 1 G/20ML
INJECTION, POWDER, LYOPHILIZED, FOR SOLUTION INTRAVENOUS AS NEEDED
Status: DISCONTINUED | OUTPATIENT
Start: 2024-02-01 | End: 2024-02-01 | Stop reason: HOSPADM

## 2024-02-01 RX ORDER — SODIUM CHLORIDE, SODIUM LACTATE, POTASSIUM CHLORIDE, CALCIUM CHLORIDE 600; 310; 30; 20 MG/100ML; MG/100ML; MG/100ML; MG/100ML
INJECTION, SOLUTION INTRAVENOUS CONTINUOUS PRN
Status: DISCONTINUED | OUTPATIENT
Start: 2024-02-01 | End: 2024-02-01

## 2024-02-01 RX ORDER — BUPIVACAINE HYDROCHLORIDE 2.5 MG/ML
INJECTION, SOLUTION EPIDURAL; INFILTRATION; INTRACAUDAL
Status: COMPLETED | OUTPATIENT
Start: 2024-02-01 | End: 2024-02-01

## 2024-02-01 RX ORDER — ONDANSETRON 2 MG/ML
INJECTION INTRAMUSCULAR; INTRAVENOUS AS NEEDED
Status: DISCONTINUED | OUTPATIENT
Start: 2024-02-01 | End: 2024-02-01

## 2024-02-01 RX ORDER — DEXAMETHASONE SODIUM PHOSPHATE 10 MG/ML
INJECTION, SOLUTION INTRAMUSCULAR; INTRAVENOUS AS NEEDED
Status: DISCONTINUED | OUTPATIENT
Start: 2024-02-01 | End: 2024-02-01

## 2024-02-01 RX ORDER — GLYCOPYRROLATE 0.2 MG/ML
INJECTION INTRAMUSCULAR; INTRAVENOUS AS NEEDED
Status: DISCONTINUED | OUTPATIENT
Start: 2024-02-01 | End: 2024-02-01

## 2024-02-01 RX ORDER — LIDOCAINE HYDROCHLORIDE 10 MG/ML
INJECTION, SOLUTION EPIDURAL; INFILTRATION; INTRACAUDAL; PERINEURAL AS NEEDED
Status: DISCONTINUED | OUTPATIENT
Start: 2024-02-01 | End: 2024-02-01

## 2024-02-01 RX ADMIN — GLYCOPYRROLATE 0.2 MCG: 0.2 INJECTION INTRAMUSCULAR; INTRAVENOUS at 07:51

## 2024-02-01 RX ADMIN — ENOXAPARIN SODIUM 30 MG: 30 INJECTION SUBCUTANEOUS at 00:46

## 2024-02-01 RX ADMIN — EPHEDRINE SULFATE 10 MG: 50 INJECTION INTRAVENOUS at 07:52

## 2024-02-01 RX ADMIN — ROCURONIUM BROMIDE 50 MG: 10 INJECTION, SOLUTION INTRAVENOUS at 07:35

## 2024-02-01 RX ADMIN — EPHEDRINE SULFATE 10 MG: 50 INJECTION INTRAVENOUS at 07:46

## 2024-02-01 RX ADMIN — CEFAZOLIN SODIUM 2000 MG: 2 SOLUTION INTRAVENOUS at 15:47

## 2024-02-01 RX ADMIN — ROCURONIUM BROMIDE 10 MG: 10 INJECTION, SOLUTION INTRAVENOUS at 08:38

## 2024-02-01 RX ADMIN — CEFAZOLIN SODIUM 2000 MG: 2 SOLUTION INTRAVENOUS at 07:33

## 2024-02-01 RX ADMIN — ROCURONIUM BROMIDE 10 MG: 10 INJECTION, SOLUTION INTRAVENOUS at 09:43

## 2024-02-01 RX ADMIN — BUPIVACAINE 20 ML: 13.3 INJECTION, SUSPENSION, LIPOSOMAL INFILTRATION at 07:16

## 2024-02-01 RX ADMIN — OXYCODONE HYDROCHLORIDE 5 MG: 5 TABLET ORAL at 00:46

## 2024-02-01 RX ADMIN — ONDANSETRON 4 MG: 2 INJECTION INTRAMUSCULAR; INTRAVENOUS at 10:47

## 2024-02-01 RX ADMIN — LIDOCAINE HYDROCHLORIDE 20 MG: 10 INJECTION, SOLUTION EPIDURAL; INFILTRATION; INTRACAUDAL; PERINEURAL at 07:35

## 2024-02-01 RX ADMIN — PHENYLEPHRINE HYDROCHLORIDE 10 MCG/MIN: 10 INJECTION INTRAVENOUS at 08:18

## 2024-02-01 RX ADMIN — SODIUM CHLORIDE, SODIUM LACTATE, POTASSIUM CHLORIDE, AND CALCIUM CHLORIDE: .6; .31; .03; .02 INJECTION, SOLUTION INTRAVENOUS at 06:46

## 2024-02-01 RX ADMIN — SODIUM CHLORIDE, SODIUM LACTATE, POTASSIUM CHLORIDE, CALCIUM CHLORIDE: 600; 310; 30; 20 INJECTION, SOLUTION INTRAVENOUS at 07:41

## 2024-02-01 RX ADMIN — INSULIN LISPRO 1 UNITS: 100 INJECTION, SOLUTION INTRAVENOUS; SUBCUTANEOUS at 19:51

## 2024-02-01 RX ADMIN — OXYCODONE HYDROCHLORIDE 5 MG: 5 TABLET ORAL at 06:18

## 2024-02-01 RX ADMIN — SUGAMMADEX 200 MG: 100 INJECTION, SOLUTION INTRAVENOUS at 11:02

## 2024-02-01 RX ADMIN — SODIUM CHLORIDE, SODIUM LACTATE, POTASSIUM CHLORIDE, AND CALCIUM CHLORIDE: .6; .31; .03; .02 INJECTION, SOLUTION INTRAVENOUS at 07:31

## 2024-02-01 RX ADMIN — BUPIVACAINE HYDROCHLORIDE 5 ML: 5 INJECTION, SOLUTION EPIDURAL; INTRACAUDAL at 07:16

## 2024-02-01 RX ADMIN — DEXAMETHASONE SODIUM PHOSPHATE 10 MG: 10 INJECTION INTRAMUSCULAR; INTRAVENOUS at 07:38

## 2024-02-01 RX ADMIN — ACETAMINOPHEN 975 MG: 325 TABLET, FILM COATED ORAL at 21:49

## 2024-02-01 RX ADMIN — INSULIN LISPRO 2 UNITS: 100 INJECTION, SOLUTION INTRAVENOUS; SUBCUTANEOUS at 21:50

## 2024-02-01 RX ADMIN — ACETAMINOPHEN 975 MG: 325 TABLET, FILM COATED ORAL at 06:17

## 2024-02-01 RX ADMIN — FENTANYL CITRATE 50 MCG: 50 INJECTION INTRAMUSCULAR; INTRAVENOUS at 07:35

## 2024-02-01 RX ADMIN — ENOXAPARIN SODIUM 30 MG: 30 INJECTION SUBCUTANEOUS at 13:25

## 2024-02-01 RX ADMIN — LEVOTHYROXINE SODIUM 25 MCG: 25 TABLET ORAL at 06:17

## 2024-02-01 RX ADMIN — PROPOFOL 120 MG: 10 INJECTION, EMULSION INTRAVENOUS at 07:35

## 2024-02-01 RX ADMIN — TRANEXAMIC ACID 1000 MG: 10 INJECTION, SOLUTION INTRAVENOUS at 07:40

## 2024-02-01 RX ADMIN — BUPIVACAINE HYDROCHLORIDE 20 ML: 2.5 INJECTION, SOLUTION EPIDURAL; INFILTRATION; INTRACAUDAL; PERINEURAL at 07:16

## 2024-02-01 NOTE — ASSESSMENT & PLAN NOTE
Lab Results   Component Value Date    HGBA1C 6.2 (H) 01/27/2023       Recent Labs     01/31/24  1833 01/31/24 2057 02/01/24  0659 02/01/24  1202   POCGLU 158* 165* 171* 165*       Blood Sugar Average: Last 72 hrs:  (P) [id number]    Hold home metformin. SSI while IP

## 2024-02-01 NOTE — ASSESSMENT & PLAN NOTE
Secondary to fall  CT chest showed left proximal clavicular fracture    Ortho consulted and note appreciated  NWB LLE  Multimodal pain regimen  PT/OT

## 2024-02-01 NOTE — PLAN OF CARE
Problem: PAIN - ADULT  Goal: Verbalizes/displays adequate comfort level or baseline comfort level  Description: Interventions:  - Encourage patient to monitor pain and request assistance  - Assess pain using appropriate pain scale  - Administer analgesics based on type and severity of pain and evaluate response  - Implement non-pharmacological measures as appropriate and evaluate response  - Consider cultural and social influences on pain and pain management  - Notify physician/advanced practitioner if interventions unsuccessful or patient reports new pain  Outcome: Progressing     Problem: INFECTION - ADULT  Goal: Absence or prevention of progression during hospitalization  Description: INTERVENTIONS:  - Assess and monitor for signs and symptoms of infection  - Monitor lab/diagnostic results  - Monitor all insertion sites, i.e. indwelling lines, tubes, and drains  - Monitor endotracheal if appropriate and nasal secretions for changes in amount and color  - Call appropriate cooling/warming therapies per order  - Administer medications as ordered  - Instruct and encourage patient and family to use good hand hygiene technique  - Identify and instruct in appropriate isolation precautions for identified infection/condition  Outcome: Progressing  Goal: Absence of fever/infection during neutropenic period  Description: INTERVENTIONS:  - Monitor WBC    Outcome: Progressing     Problem: SAFETY ADULT  Goal: Patient will remain free of falls  Description: INTERVENTIONS:  - Educate patient/family on patient safety including physical limitations  - Instruct patient to call for assistance with activity   - Consult OT/PT to assist with strengthening/mobility   - Keep Call bell within reach  - Keep bed low and locked with side rails adjusted as appropriate  - Keep care items and personal belongings within reach  - Initiate and maintain comfort rounds  - Make Fall Risk Sign visible to staff  - Offer Toileting every  Hours,  in advance of need  - Initiate/Maintain alarm  - Obtain necessary fall risk management equipment:   - Apply yellow socks and bracelet for high fall risk patients  - Consider moving patient to room near nurses station  Outcome: Progressing  Goal: Maintain or return to baseline ADL function  Description: INTERVENTIONS:  -  Assess patient's ability to carry out ADLs; assess patient's baseline for ADL function and identify physical deficits which impact ability to perform ADLs (bathing, care of mouth/teeth, toileting, grooming, dressing, etc.)  - Assess/evaluate cause of self-care deficits   - Assess range of motion  - Assess patient's mobility; develop plan if impaired  - Assess patient's need for assistive devices and provide as appropriate  - Encourage maximum independence but intervene and supervise when necessary  - Involve family in performance of ADLs  - Assess for home care needs following discharge   - Consider OT consult to assist with ADL evaluation and planning for discharge  - Provide patient education as appropriate  Outcome: Progressing  Goal: Maintains/Returns to pre admission functional level  Description: INTERVENTIONS:  - Perform AM-PAC 6 Click Basic Mobility/ Daily Activity assessment daily.  - Set and communicate daily mobility goal to care team and patient/family/caregiver.   - Collaborate with rehabilitation services on mobility goals if consulted  - Perform Range of Motion 3 times a day.  - Reposition patient every 3 hours.  - Dangle patient 3 times a day  - Stand patient 3 times a day  - Ambulate patient 3 times a day  - Out of bed to chair 3 times a day   - Out of bed for meals 3 times a day  - Out of bed for toileting  - Record patient progress and toleration of activity level   Outcome: Progressing

## 2024-02-01 NOTE — ANESTHESIA PROCEDURE NOTES
Peripheral Block    Patient location during procedure: pre-op  Start time: 2/1/2024 7:16 AM  Reason for block: at surgeon's request and post-op pain management  Staffing  Performed by: Brock Marquis MD  Authorized by: Brock Marquis MD    Preanesthetic Checklist  Completed: patient identified, IV checked, site marked, risks and benefits discussed, surgical consent, monitors and equipment checked, pre-op evaluation and timeout performed  Peripheral Block  Patient position: supine  Prep: ChloraPrep  Patient monitoring: continuous pulse ox, frequent blood pressure checks and heart rate  Block type: interscalene  Laterality: left  Injection technique: single-shot  Procedures: ultrasound guided, Ultrasound guidance required for the procedure to increase accuracy and safety of medication placement and decrease risk of complications.  Ultrasound permanent image savedbupivacaine (PF) (MARCAINE) 0.5 % injection 20 mL - Perineural   5 mL - 2/1/2024 7:16:00 AM  Needle  Needle type: Stimuplex   Needle gauge: 20 G  Needle length: 4 in  Needle localization: ultrasound guidance  Assessment  Injection assessment: incremental injection, local visualized surrounding nerve on ultrasound and no paresthesia on injection  Paresthesia pain: none  Post-procedure:  pressure dressing applied  patient tolerated the procedure well with no immediate complications  Additional Notes  Uncomplicated interscalene block  Supplemented with superficial cervical plexus block  Local anesthetic deposited between C5 and C6

## 2024-02-01 NOTE — ANESTHESIA POSTPROCEDURE EVALUATION
Post-Op Assessment Note    CV Status:  Stable  Pain Score: 0    Pain management: adequate    Multimodal analgesia used between 6 hours prior to anesthesia start to PACU discharge    Mental Status:  Alert and awake   Hydration Status:  Euvolemic   PONV Controlled:  None   Airway Patency:  Patent  Airway: intubated  Two or more mitigation strategies used for obstructive sleep apnea   Post Op Vitals Reviewed: Yes    No anethesia notable event occurred.    Staff: Anesthesiologist, STEPHEN               BP (!) 187/83 (02/01/24 1125)    Temp (!) 97.2 °F (36.2 °C) (02/01/24 1125)    Pulse 82 (02/01/24 1125)   Resp 18 (02/01/24 1125)    SpO2 100 % (02/01/24 1125)

## 2024-02-01 NOTE — OP NOTE
OPERATIVE REPORT  PATIENT NAME: Eulalio Raphael    :  1945  MRN: [id number]  Pt Location: [facility] OR ROOM 01    SURGERY DATE: 2024    Surgeon(s) and Role:     * Tigre Kaufman DO - Primary  * Thor Stone MD - Assisting     * Chris Gordillo PA-C - Assisting   I was present for the entire procedure. and I was present for all critical portions of the procedure.    Preop Diagnosis:  #1 left three-part proximal humerus fracture with significant metaphyseal impaction    Post-Op Diagnosis:  #1 left three-part proximal humerus fracture with significant metaphyseal impaction    Procedures:  #1 surgical fixation of left proximal humerus fracture with a reverse total shoulder arthroplasty      Specimen(s):  None    Estimated Blood Loss:   250 cc    Drains:  None    Anesthesia Type:   General endotracheal    Operative Indications:  Patient is a 78-year-old female that had a fall down approximately 15 stairs who is normally independent with no antecedent shoulder pain that had a highly comminuted left three-part proximal humerus fracture.  The patient had significant comminution of her greater tuberosity and her humeral head was impacted significantly leaving little metaphyseal bone stock and retroverted significantly.  There are fracture lines extending into the lesser tuberosity however these were nondisplaced.  The patient had some metaphyseal comminution and osteopenia.  I discussed operative versus nonoperative management with the patient's and the patient's family and in order to try to restore and sustain range of motion in the left shoulder and to prevent high failure rate with open reduction internal fixation the patient was consented for left shoulder arthroplasty      Implants:   Tornier perform humeral stem, size 9  +6 constrained polyethylene cup  25 mm Tornier perform reverse glenoid component  36+3 lateralized glenosphere    Tourniquet time:   None      Complications:   No acute complications were  encountered.  Patient was transferred to PACU in stable condition    Operative findings:  Patient had comminution of the proximal humerus.  She had displacement of her greater tuberosity and her humeral head had extremely small amounts of bone stock remaining.  She had labral wear and degeneration and glenoid osteoarthritic changes.  The tuberosities were mobilized and secured the glenoid component was able to be implanted with 3 points of fixation as well as the central screw.  After preparation of the humerus a black and tan technique was utilized to prepare the fixation of the humeral stem.  The humeral stem was then placed into 30 degrees of retroversion and a +6 constrained polyethylene was used to restore length and stability.  The tuberosities were then repaired around the humeral component with #5 braided suture in a tension band type construct.  The bone graft from the humeral head was placed underneath the tuberosities prior to repair for increased healing potential to the stem.  After implantation of all the components the humerus was stable throughout all ranges of motion and no mechanical blocks were present.  A biceps tenodesis was performed to the pectoralis major tendon    Procedure and Technique:  Patient is a 78-year-old female that was seen and examined the preoperative holding area.  The operative extremities marked.  All patient's questions were answered.  The patient was then taken back to the operating room where general endotracheal anesthesia was administered by department anesthesia.  Patient was then transferred the operating table using CT LS spine precautions.  All bony prominences were well-padded.  The patient's left upper extremity was then prepped and draped in a standard sterile orthopedic fashion.  A timeout was performed confirm correct site, correct patient, correct procedure.  All were in agreement procedure was started.  A deltopectoral approach was performed and made sharply  with a #10 blade through skin subcutaneous tissues.  Electrocautery was used to obtain hemostasis.  The cephalic vein was identified in the deltopectoral groove.  This was mobilized and dissected free of tension.  The deltopectoral interval was then opened and the torn clavipectoral fascia was debrided using a pituitary rongeur.  The subacromial space and subdeltoid space were bluntly dissected to allow for mobilization of the soft tissues.  The biceps was intact the patient had metaphyseal comminution she had incomplete nondisplaced fracture of the lesser tuberosity.  A displaced greater tuberosity fracture.  Her rotator cuff was intact and attached to these fragments.  The anatomic humeral head fracture was impacted into the shaft and retroverted significantly.  An osteotome was used to mobilize the tuberosity fragments.  They were tagged with #5 Ethibond for retraction.  The humeral head was then removed after using an osteotome to free up its edges.  This was saved for later bone grafting.  The biceps was identified and using the biceps as a landmark the rotator cuff interval was developed back to the glenoid.  The labrum and the biceps were then removed circumferentially around the glenoid.  The inferior capsule was reflected to visualize the inferior aspect of the glenoid while protecting the axillary nerve.  With the retractors posteriorly and anteriorly to the glenoid the anatomic axis of the glenoid was marked out with electrocautery.  A 10 degree inferior tilt guide was utilized and drilled.  Its position confirmed.  The reamer was then used to to remove the subchondral bone.  Good bleeding bone was noted inferiorly and superiorly the cartilage was taken down.  The central hole was then drilled and a 30 mm central screw was then placed.  With the central hole filled the 25 mm glenoid component was then placed into position with the appropriate screw alignment and tightened with good fixation.  Soft tissue  remover was then used to remove any surrounding impinging soft tissues and bone.  The 36 mm glenosphere was then placed into the glenoid component impacted and then secured using the central screw.  Attention was then turned to humeral shaft preparation.  The tuberosities were then prepped for final fixation with loop suture.  2 sutures placed through the infraspinatus and 2 sutures through the teres minor were placed.  Drill holes were made in the humeral shaft the past 2 loop sutures.  The humeral side was then prepped using sequential broaching.  A size 9 stem allowed for appropriate fit.  We then trialed off of this 9 stem and a +6 allow for appropriate restoration of length.  The trial components were then removed and the wound was copiously irrigated with sterile normal saline.  Gloves were changed.  Final open components were assembled on the back table.  The humeral component was then filled with bone graft through the open screw holes as well as through the central portion of the proximal aspect of the stem.  The rest of the bone graft from the humeral head was harvested for later bone grafting.  Cement Tatian was then started.  The humeral canal was irrigated with sterile normal saline.  The cement restrictor was placed to the appropriate depth.  Suction and laparotomy sponges were stuffed into the humerus to decrease blood.  The cement was then injected into the diaphyseal portion of the humerus.  It was pressurized digitally and the humeral stem was then impacted into its trial position.  It was then held in place until cement hardening.  The bone graft that was previously harvested was then packed in around the stem metaphysis and the +6 polyethylene constrained liner was placed.  Once this was impacted and confirmed to be seated the humerus was then reduced.  The humerus was brought through range of motion and stable in all planes.  There was no impingement during rotation.  The wound was copiously  irrigated with sterile normal saline.  The tuberosities were then repaired with 2 sutures wrapping around the humeral component and securing the greater tuberosity into his anatomic position with the shoulder in neutral.  The lesser tuberosity was then secured using 2 sutures through from the greater tuberosity around the stem and through the subscapularis and these were tied around the stem.  The placed sutures through the humeral drill holes were then secured in a tension band fashion over the top of the previous tight sutures and good secure fixation of the tuberosities was obtained.  The shoulder was then brought through range of motion and once again found to be stable.  Tuberosity repair was stable.  The final radiographic images were taken to confirm appropriate implant placement and secure fit.  The wound was then copiously irrigated with sterile normal saline.  1 g of vancomycin was placed deep in the wound.  The biceps tendon was tenodesed to the pectoralis major tendon the deltopectoral interval was then loosely approximated.  The subcutaneous tissues were then repaired using a 2-0 Monocryl suture.  The skin was then closed with staples.  The patient's wound was then dressed in a Mepilex dressing.  The patient was then placed into an abduction sling transferred off the operating room table using CT LS spine precautions extubated and transferred to PACU in stable condition        Postoperative plan:  Patient will be nonweightbearing to the left upper extremity.  The patient will receive 24 hours of postoperative antibiotics for infection prophylaxis.  The patient should maintain the left upper extremity in the sling at all times for the first 2 weeks.  The patient will follow-up with me in my office in 2 weeks time for repeat x-ray evaluation and removal of the staples and the initiation of formal physical therapy    SIGNATURE: Tigre Kaufman DO  DATE: February 1, 2024  TIME: 11:46 AM

## 2024-02-01 NOTE — UTILIZATION REVIEW
Initial Clinical Review    Admission: Date/Time/Statement:   Admission Orders (From admission, onward)       Ordered        01/31/24 0054  Inpatient Admission  Once                          Orders Placed This Encounter   Procedures    Inpatient Admission     Standing Status:   Standing     Number of Occurrences:   1     Order Specific Question:   Level of Care     Answer:   Med Surg [16]     Order Specific Question:   Estimated length of stay     Answer:   More than 2 Midnights     Order Specific Question:   Certification     Answer:   I certify that inpatient services are medically necessary for this patient for a duration of greater than two midnights. See H&P and MD Progress Notes for additional information about the patient's course of treatment.     ED Arrival Information       Expected   -    Arrival   1/30/2024 18:21    Acuity   Urgent              Means of arrival   Ambulance    Escorted by   [facility]    Service   Trauma    Admission type   Emergency              Arrival complaint   fall             Chief Complaint   Patient presents with    Fall     Pt arrives via ems from home, fall down flight of stairs, given 25 mcg of fentanyl via ems, c-collar placed by ems, headstrike, denies LOC/thinners, left arm pain       Initial Presentation: 78 y.o. female with hx HTN, DM2, hypothyroid, anxiety/depression , lymphedema of left arm, breast cancer in remission, papillary thyroid cancer in remission who presents to ED 1/30 via EMS from home after reported fall down 15 steps after losing her footing while going down steps . Family found her at the bottom of the steps on hands and knees, with blood coming out of her nose. She denies any LOC . Not on AC/AP . Reports L shoulder and clavicular pain  . On exam, GCS 15 , bradycardic, has L eyebrow area small hematoma . Nasal tenderness with dried blood bilat nares. TTP L clavicle and L shoulder. LUE in sling  . LUE well perfused, chronic lymphedema LUE .  Strong radial pulse LUE , strong hand . Alert, oriented  . Imaging shows left proximal clavicle fracture and left proximal humeral fracture with displacement, as well as nasal bone fractures . Pt given IVF, IV analgesics in ED. Admitted as Inpatient by trauma service with L proximal clavicle fx, L proximal humerus fx, nasal bone fx s/p fall . Plan - Obtain CT L shoulder, NPO after MN. Ortho consult. OMFS consult. LUE sling , neurovasc checks LUE . Pain control. PT/OT .     1/31   gerontology consult- Alert, oriented x 4 . Pain 8/10 L arm , tired and reports difficulty sleeping overnight . LUE in sling . CT upper extremity showed clavicular tip fracture   Fall monitoring. OOB as nata. PT/OT . Multimodal pain control. .   OMFS consult- minimally displaced nasal bone fracture. Minimal swelling around base of nose , no further nose bleed. Dried blood at b/l nares No nasal dorsum deviation. No septal hematoma. Plan- diet as nata. HOB elevation Ice to face prn  x 24 h then heat for comfort . F/U in 1 week as outpt . Pain control.   Ortho consult-  Pain in the left shoulder, with radiations towards the left elbow, pain in the left chest wall as well. She also has pain in the face/nose. She has no numbness/tingling LUE .Some swelling through the left hand, but feels that this has been exacerbated by sling placement. The sling was loosened slightly this AM, and the swelling on the dorsum of the hand has improved with digital range of motion. two small abrasions over the shoulder sustained during fall. Imaging - highly comminuted proximal humeral fracture, left proximal clavicle fracture, clavicular tip fracture. PLan - NWB LUE in sling . NPO after MN for operative fixation 2/1 .       Date: 2/1    Day 3: Has surpassed a 2nd midnight with active treatments and services, which include :  Pt to OR today for reverse shoulder arthroplasty . Preoperatively denies any numbness or paresthesias in the upper extremity.   Hgb 9.7  from 13.7  - continue to monitor .  Awaits PT/OT evals .         ED Triage Vitals   Temperature Pulse Respirations Blood Pressure SpO2   01/30/24 1825 01/30/24 1825 01/30/24 1825 01/30/24 1826 01/30/24 1825   97.7 °F (36.5 °C) (!) 44 16 168/74 99 %      Temp Source Heart Rate Source Patient Position - Orthostatic VS BP Location FiO2 (%)   01/30/24 1825 01/30/24 1825 01/30/24 2030 01/30/24 2030 --   Oral Monitor Lying Right arm       Pain Score       01/30/24 1931       8          Wt Readings from Last 1 Encounters:   11/06/23 62.4 kg (137 lb 9.6 oz)     Additional Vital Signs:   ate/Time Temp Pulse Resp BP MAP (mmHg) SpO2 O2 Device Patient Position - Orthostatic VS   02/01/24 0644 98 °F (36.7 °C) 55 18 150/68 -- 98 % None (Room air) --   01/31/24 22:18:16 97.7 °F (36.5 °C) 52 Abnormal  -- 164/67 99 97 % -- --   01/31/24 1500 -- 49 Abnormal  18 131/61 88 99 % None (Room air) Lying   01/31/24 1430 -- 49 Abnormal  18 132/59 85 99 % -- --   01/31/24 1400 -- 85 18 140/64 92 -- -- --   01/31/24 1330 -- 70 18 142/64 92 -- -- --   01/31/24 1300 -- 55 18 147/69 99 98 % -- --   01/31/24 1230 -- -- 18 136/63 91 -- -- --   01/31/24 1200 -- 60 18 137/64 92 -- -- --   01/31/24 1130 -- -- 18 132/60 86 -- -- --   01/31/24 1000 -- 60 18 134/63 91 97 % -- --   01/31/24 0930 -- 55 18 160/77 110 -- -- --   01/31/24 0915 -- 50 Abnormal  18 159/66 95 97 % -- --   01/31/24 0909 -- -- -- 159/66 -- -- -- --   01/31/24 0700 -- 49 Abnormal  18 153/70 100 -- -- --   01/31/24 0620 -- 43 Abnormal  18 -- -- -- -- --   01/31/24 0615 -- 49 Abnormal  18 139/64 92 97 % None (Room air) Sitting   01/31/24 0220 -- 50 Abnormal  18 -- -- 94 % None (Room air) Sitting   01/31/24 0215 -- 49 Abnormal  18 127/60 87 94 % None (Room air) Lying   01/31/24 0150 -- 51 Abnormal  18 146/67 96 95 % None (Room air) Sitting   01/30/24 2220 -- 50 Abnormal  18 -- -- 94 % None (Room air) Sitting   01/30/24 2200 -- 51 Abnormal  18 146/67 96 97 % None (Room air) Sitting  "  01/30/24 2030 -- 49 Abnormal  18 163/71 102 97 % None (Room air) Lying       Date and Time R Pupil Size (mm) L Pupil Size (mm) R Pupil Reaction L Pupil Reaction   01/31/24 0700 2 2 Brisk Brisk   01/31/24 0620 2 2 Brisk Brisk     Trauma Secondary Assessment - Circulation    Date and Time R Radial Pulse L Radial Pulse R Pedal Pulse L Pedal Pulse   01/31/24 2000 -- +2 -- --   01/31/24 1651 +2 +2 +2 +2   01/31/24 0500 -- Palpable -- --   01/31/24 0100 -- Palpable -- --     Trauma Secondary Assessment - Fenton Coma Scale    Date and Time Eye Opening Best Verbal Response Best Motor Response Fenton Coma Scale Score   01/31/24 1651 4 5 6 15   01/31/24 0700 4 5 6 15   01/31/24 0620 4 5 6 15   01/31/24 0220 4 5 6 15   01/30/24 2220 4 5 6 15   01/30/24 1828 4 5 6 15   01/30/24 1826 4 5 6 15             Pertinent Labs/Diagnostic Test Results:    1/30 ECG- Sinus bradycardia   XR humerus left   Final Result by Torsten Vicente DO (02/01 0913)      Comminuted, displaced left proximal humerus fracture with little change in alignment from prior study.      Workstation performed: [id number]         CT upper extremity wo contrast left   Final Result by Lucius Morillo MD (01/31 0318)      1.  Highly comminuted proximal humeral fracture as described   2.  Clavicular tip fracture         Workstation performed: [id number]         CT head without contrast   Final Result by Ac Charles DO (01/30 2037)      Progression of now moderate chronic microangiopathic change with no acute intracranial abnormality identified.      Suspected nondisplaced left nasal bone fracture with overlying soft tissue swelling.      There is hyperdense material layering within the left maxillary sinus which could represent hemorrhage within the sinus, sequela of an occult nondisplaced fracture.      This examination was marked \"immediate notification\" in Epic in order to begin the standard process by which the radiology reading room liaison " alerts the referring practitioner.                  Workstation performed: [id number]         CT cervical spine without contrast   Final Result by Pallav N Shah, MD (01/30 2023)      No cervical spine fracture or traumatic malalignment.      Multinodular goiter with multiple calcified right thyroid nodules.      Incompletely visualized soft tissue mass in the right upper thorax, possibly extrapleural for which a dedicated CT chest with contrast is recommended.      The study was marked in EPIC for significant notification.               Workstation performed: [id number]         CT chest wo contrast   Final Result by Lucius Morillo MD (01/30 2205)      1.  Left proximal clavicular fracture   2.  Left proximal humeral fracture   3.  Thyroid goiter with resolution of rightward tracheal deviation               Workstation performed: [id number]         XR hip/pelv 2-3 vws left   Final Result by Nilo Morillo DO (01/31 0122)      No acute osseous abnormality.            Workstation performed: [id number]         XR shoulder 2+ views LEFT   Final Result by Nilo Morillo DO (01/31 0130)   Comminuted left shoulder fracture as detailed above..      Workstation performed: [id number]         XR hand 3+ views LEFT   Final Result by Kirt Pérez MD (01/31 1727)      No acute osseous abnormality.            Workstation performed: [id number]         XR humerus LEFT   Final Result by Lucius Morillo MD (01/31 0123)      1.  Comminuted proximal humeral fracture   2.  Likely coracoid tip fracture      Workstation performed: [id number]         XR forearm 2 views LEFT   Final Result by Nilo Morillo DO (01/31 0130)   Comminuted left shoulder fracture as detailed above..      Workstation performed: [id number]         XR chest 1 view portable   Final Result by Nilo Morillo DO (01/31 0133)   No focal consolidation or pneumothorax.   Left shoulder and clavicular fractures.   Thyroid goiter..                  Workstation performed: [id number]          XR shoulder 1 vw left    (Results Pending)         Results from last 7 days   Lab Units 02/01/24  0618 01/31/24  0645 01/30/24  1930   WBC Thousand/uL 7.71 6.79 9.82   HEMOGLOBIN g/dL 9.7* 11.9 13.7   HEMATOCRIT % 30.0* 37.1 42.7   PLATELETS Thousands/uL 144* 188 216   NEUTROS ABS Thousands/µL 5.57 4.92 5.62         Results from last 7 days   Lab Units 02/01/24  0618 01/31/24  0645 01/30/24  1930   SODIUM mmol/L 136 138 137   POTASSIUM mmol/L 3.9 4.4 3.8   CHLORIDE mmol/L 105 105 103   CO2 mmol/L 24 28 25   ANION GAP mmol/L 7 5 9   BUN mg/dL 15 19 15   CREATININE mg/dL 0.64 0.84 0.90   EGFR ml/min/1.73sq m 86 67 61   CALCIUM mg/dL 8.9 9.6 10.1   MAGNESIUM mg/dL 1.4*  --   --    PHOSPHORUS mg/dL 2.5  --   --      Results from last 7 days   Lab Units 01/30/24  1930   AST U/L 27   ALT U/L 22   ALK PHOS U/L 91   TOTAL PROTEIN g/dL 6.9   ALBUMIN g/dL 3.7   TOTAL BILIRUBIN mg/dL 0.55     Results from last 7 days   Lab Units 02/01/24  0659 01/31/24 2057 01/31/24  1833 01/31/24  1259 01/31/24  0902 01/31/24  0147   POC GLUCOSE mg/dl 171* 165* 158* 110 116 173*     Results from last 7 days   Lab Units 02/01/24  0618 01/31/24  0645 01/30/24  1930   GLUCOSE RANDOM mg/dL 136 120 160*           Results from last 7 days   Lab Units 01/31/24  0645   PROTIME seconds 13.3   INR  0.96               ED Treatment:   Medication Administration from 01/30/2024 1821 to 01/31/2024 1541         Date/Time Order Dose Route Action     01/30/2024 1931 EST morphine injection 4 mg 4 mg Intravenous Given     01/30/2024 2029 EST lactated ringers bolus 1,000 mL 0 mL Intravenous Stopped     01/30/2024 1929 EST lactated ringers bolus 1,000 mL 1,000 mL Intravenous New Bag     01/30/2024 2052 EST HYDROmorphone (DILAUDID) injection 0.5 mg 0.5 mg Intravenous Given     01/30/2024 2159 EST HYDROmorphone (DILAUDID) injection 1 mg 1 mg Intravenous Given     01/31/2024 1318 EST enoxaparin (LOVENOX) subcutaneous injection 30 mg 30 mg Subcutaneous Given      01/31/2024 0142 EST enoxaparin (LOVENOX) subcutaneous injection 30 mg 30 mg Subcutaneous Given     01/31/2024 1319 EST acetaminophen (TYLENOL) tablet 975 mg 975 mg Oral Given     01/31/2024 0627 EST acetaminophen (TYLENOL) tablet 975 mg 0 mg Oral Hold     01/31/2024 0141 EST acetaminophen (TYLENOL) tablet 975 mg 975 mg Oral Given     01/31/2024 0727 EST oxyCODONE (ROXICODONE) split tablet 2.5 mg -- Oral See Alternative     01/31/2024 0727 EST oxyCODONE (ROXICODONE) IR tablet 5 mg 5 mg Oral Given     01/31/2024 1100 EST HYDROmorphone HCl (DILAUDID) injection 0.2 mg 0.2 mg Intravenous Given     01/31/2024 0910 EST senna (SENOKOT) tablet 17.2 mg 17.2 mg Oral Given     01/31/2024 0904 EST polyethylene glycol (MIRALAX) packet 17 g 17 g Oral Not Given     01/31/2024 0909 EST lidocaine (LIDODERM) 5 % patch 1 patch 1 patch Topical Medication Applied     01/31/2024 0726 EST levothyroxine tablet 25 mcg 25 mcg Oral Given     01/31/2024 0909 EST amLODIPine (NORVASC) tablet 10 mg 10 mg Oral Given     01/31/2024 0909 EST PARoxetine (PAXIL) tablet 20 mg 20 mg Oral Given     01/31/2024 1300 EST insulin lispro (HumaLOG) 100 units/mL subcutaneous injection 1-5 Units -- Subcutaneous Not Given     01/31/2024 0904 EST insulin lispro (HumaLOG) 100 units/mL subcutaneous injection 1-5 Units -- Subcutaneous Not Given     01/31/2024 0148 EST insulin lispro (HumaLOG) 100 units/mL subcutaneous injection 1-5 Units 1 Units Subcutaneous Given     01/31/2024 1259 EST multi-electrolyte (PLASMALYTE-A/ISOLYTE-S PH 7.4) IV solution 75 mL/hr Intravenous New Bag          Past Medical History:   Diagnosis Date    Breast cancer (HCC)     Hypertension      Present on Admission:   Type 2 diabetes mellitus without complication, without long-term current use of insulin (HCC)   Essential hypertension   Acquired hypothyroidism      Admitting Diagnosis: Generalized anxiety disorder [F41.1]  Acquired hypothyroidism [E03.9]  Recurrent depression (HCC)  [F33.9]  Closed fracture of nasal bone, initial encounter [S02.2XXA]  Closed displaced fracture of surgical neck of left humerus, unspecified fracture morphology, initial encounter [S42.212A]  Fracture of unspecified part of left clavicle, initial encounter for closed fracture [S42.002A]  Type 2 diabetes mellitus without complication, without long-term current use of insulin (HCC) [E11.9]  Lymphedema of left arm [I89.0]  Age/Sex: 78 y.o. female  Admission Orders:  Scheduled Medications:  [Transfer Hold] acetaminophen, 975 mg, Oral, Q8H MAGALI  [Transfer Hold] amLODIPine, 10 mg, Oral, Daily  [Transfer Hold] enoxaparin, 30 mg, Subcutaneous, Q12H  [Transfer Hold] insulin lispro, 1-5 Units, Subcutaneous, 4x Daily  [Transfer Hold] levothyroxine, 25 mcg, Oral, Early Morning  [Transfer Hold] lidocaine, 1 patch, Topical, Daily  [Transfer Hold] PARoxetine, 20 mg, Oral, Daily  [Transfer Hold] polyethylene glycol, 17 g, Oral, Daily  [Transfer Hold] senna, 2 tablet, Oral, Daily    ceFAZolin (ANCEF) IVPB (premix in dextrose) 2,000 mg 50 mL  Dose: 2,000 mg  Freq: On call to O.R. Route: IV  Start: 02/01/24 0700 End: 02/01/24 0733   bupivacaine liposomal (EXPAREL) 1.3 % injection 20 mL  Dose: 20 mL  Freq: Once Route: INFILTRATION  Indications of Use: POSTOPERATIVE PAIN,REGIONAL ANESTHESIA  Start: 02/01/24 0700 End: 02/01/24 0716       Continuous IV Infusions:     PRN Meds:   HYDROmorphone, 0.2 mg, Intravenous, Q2H PRN x1 1/31    naloxone, 0.04 mg, Intravenous, Q1MIN PRN   ondansetron, 4 mg, Intravenous, Q8H PRN  oxyCODONE, 2.5 mg, Oral, Q4H PRN   Or   oxyCODONE, 5 mg, Oral, Q4H PRN x1 1/31, x 2 2/1  sodium chloride, , , PRN  sterile water, , , PRN  vancomycin, , , PRN    NPO    SCD   OOB to chair TID        IP CONSULT TO ORAL AND MAXILLOFACIAL SURGERY  IP CONSULT TO ORTHOPEDIC SURGERY  IP CONSULT TO GERONTOLOGY  IP CONSULT TO CASE MANAGEMENT  IP CONSULT TO ACUTE PAIN SERVICE    Network Utilization Review Department  ATTENTION: Please  call with any questions or concerns to [phone] and carefully listen to the prompts so that you are directed to the right person. All voicemails are confidential.   For Discharge needs, contact Care Management DC Support Team at [phone] opt. 2  Send all requests for admission clinical reviews, approved or denied determinations and any other requests to dedicated fax number below belonging to the campus where the patient is receiving treatment. List of dedicated fax numbers for the Facilities:  FACILITY NAME UR FAX NUMBER   ADMISSION DENIALS (Administrative/Medical Necessity) [phone]   DISCHARGE SUPPORT TEAM (NETWORK) [phone]   PARENT CHILD HEALTH (Maternity/NICU/Pediatrics) [phone]   [facility] [phone]   [facility] [phone]   [facility] [phone]   [facility] [phone]   [facility] [phone]   [facility] [phone]   [facility] [phone]   [facility] [phone]   [facility] [phone]   [facility] [phone]   [facility] [phone]   [facility] [phone]

## 2024-02-01 NOTE — ANESTHESIA PROCEDURE NOTES
Peripheral Block    Patient location during procedure: pre-op  Start time: 2/1/2024 7:16 AM  Reason for block: at surgeon's request and post-op pain management  Staffing  Performed by: Brock Marquis MD  Authorized by: Brock Marquis MD    Preanesthetic Checklist  Completed: patient identified, IV checked, site marked, risks and benefits discussed, surgical consent, monitors and equipment checked, pre-op evaluation and timeout performed  Peripheral Block  Patient position: supine  Prep: ChloraPrep  Patient monitoring: continuous pulse ox, frequent blood pressure checks and heart rate  Block type: PECS I/II  Laterality: left  Injection technique: single-shot  Procedures: ultrasound guided, Ultrasound guidance required for the procedure to increase accuracy and safety of medication placement and decrease risk of complications.  Ultrasound permanent image savedbupivacaine (PF) (MARCAINE) 0.25 % injection 20 mL - Perineural   20 mL - 2/1/2024 7:16:00 AM  Needle  Needle type: Stimuplex   Needle gauge: 20 G  Needle length: 4 in  Needle localization: ultrasound guidance  Assessment  Injection assessment: incremental injection, local visualized surrounding nerve on ultrasound and no paresthesia on injection  Paresthesia pain: none  patient tolerated the procedure well with no immediate complications

## 2024-02-01 NOTE — ASSESSMENT & PLAN NOTE
Secondary to fall  CT imaging showed: Comminuted fracture through the surgical neck of the humerus. The humeral head articular surface, greater tuberosity, lesser tuberosity and shaft are all separate fracture fragments. The greater and lesser tuberosities are not distinctly appreciated given the high degree of comminution. There is significant angulation between the humeral shaft and remaining fracture components  Ortho consulted and note appreciated.   2/1 surgical fixation of left proximal humerus fracture with a reverse total shoulder arthroplasty   NWB LLE--arm sling\  Multimodal pain regimen initiated.   Follow up with Ortho as an OP   PROCEDURES:  Photoselective vaporization of prostate (PVP) with GreenLight laser 27-Jul-2020 12:47:31  Kevin Kevin

## 2024-02-01 NOTE — OCCUPATIONAL THERAPY NOTE
Occupational Therapy Cancellation Note        Patient Name: Eulalio Raphael  Today's Date: 2/1/2024 02/01/24 0804   OT Last Visit   OT Visit Date 02/01/24   Note Type   Note type Cancelled Session   Cancel Reasons Patient to operating room   Additional Comments OT orders received, chart reviewed. Pt to OR for communited proximal humeral fx, will f/u post operatively. Appreciate updated WBS and activity orders post op.     Bing Carpio, OT

## 2024-02-01 NOTE — PROGRESS NOTES
Progress Note - Geriatric Medicine   Eulalio Raphael 78 y.o. female MRN: [id number]  Unit/Bed#: W -01 Encounter: [id number]      Assessment/Plan:  Closed left humeral fracture  S/p fall  CT imaging showed comminuted fracture through the surgical neck of the humerus.  The humeral head articular surface, greater tuberosity, lesser tuberosity and shaft are all separate fracture fragments.  The greater and lesser tuberosities are not distinctly appreciated given the high degree of comminution. there is significant angulation between the humeral shaft remaining fracture components.  Orthopedics was consulted  Patient underwent surgical fixation of left proximal humerus fracture with reverse total shoulder arthroplasty  NWB to LUE  Patient will receive 24 hours postop antibiotics for infection prophylaxis  Patient to remain in sling to LUE for 2 weeks  Multimodal pain regimen including geriatric pain protocol  Outpatient follow-up with orthopedics    Closed nondisplaced fracture of left clavicle  S/p fall  CT chest showed left proximal clavicular fracture  Ortho was consulted and following  NWB to LLE  Multimodal pain regimen including geriatric pain protocol  PT/OT following  Outpatient follow-up with orthopedics    Nasal bone fracture  CT shows suspected nondisplaced left nasal bone fracture with overlying soft tissue swelling  OMFS was consulted  No indication for surgical repair at this time  Diet as tolerated with good oral hygiene  Ice to face as needed as needed  Follow-up outpatient with OMFS office    Anxiety/depression  Patient reports chronic anxiety managed on Paxil 20 mg daily  Anxiety had been well-controlled at home  Continue to provide emotional support    Delirium precautions  Patient is alert oriented x4  Recommend checking vitamin B12, TSH, vitamin D levels  At risk for delirium due to hospitalization, medications, sleep disturbance  Recommend delirium precautions  Maintain sleep-wake cycle, avoid  nighttime interruptions  minimize overnight interruptions, group overnight vitals/labs/nursing checks as possible  dim lights, close blinds and turn off tv to minimize stimulation and encourage sleep environment in evenings  Provide adequate pain control  Avoid urinary retention and constipation  Provide frequent and early mobilization  Provide frequent redirection and reorientation as needed  Avoid medications that may worsen or precipitate delirium such as tramadol, benzodiazepines, anticholinergics, and Benadryl  Redirect unwanted behaviors as first-line therapy, avoid physical restraints as able to  Continue to monitor    Constipation  Patient complains of constipation  Last BM was prior to hospitalization  Is currently on Senokot 2 tablets daily and MiraLAX daily  Encourage adequate p.o. Hydration  Encourage prune juice as tolerated    Ambulatory dysfunction  At a baseline ambulates with out assistive device  PT/OT following  Fall precautions  Out of bed as tolerated  Encourage early and frequent mobilization  Encourage adequate hydration and nutrition  Provide adequate pain management   Goal is undetermined at this time  Continue with PT/OT for continued strength and balance training     Subjective:   Eulalio is a 78-year-old female being seen for a geriatrics follow-up.  Upon examination patient was lying bed, sleeping.  She arouses easily to voice.  She appeared comfortable and was in no acute distress.  Appetite is slightly decreased.  She slept well overnight.  She has not moved her bowels since hospitalized.  Per nursing no acute concerns or issues at this time.    Review of Systems   Constitutional:  Negative for activity change, appetite change, chills, fatigue and fever.   HENT:  Negative for sore throat and trouble swallowing.    Eyes:  Negative for visual disturbance.   Respiratory:  Negative for cough and shortness of breath.    Cardiovascular:  Negative for chest pain and palpitations.    Gastrointestinal:  Positive for constipation. Negative for abdominal pain, diarrhea, nausea and vomiting.   Genitourinary:  Negative for difficulty urinating, dysuria and hematuria.   Musculoskeletal:  Positive for arthralgias and gait problem. Negative for back pain.   Skin:  Negative for color change and rash.   Neurological:  Positive for weakness. Negative for dizziness, light-headedness and headaches.   Psychiatric/Behavioral:  Negative for confusion, dysphoric mood and sleep disturbance. The patient is nervous/anxious.          Objective:     Vitals: Blood pressure 135/65, pulse 58, temperature 97.7 °F (36.5 °C), resp. rate 18, SpO2 99%.,There is no height or weight on file to calculate BMI.      Intake/Output Summary (Last 24 hours) at 2/1/2024 1429  Last data filed at 2/1/2024 1129  Gross per 24 hour   Intake 1850 ml   Output 550 ml   Net 1300 ml       Current Medications: Reviewed    Physical Exam:   Physical Exam  Vitals reviewed.   Constitutional:       General: She is sleeping. She is not in acute distress.     Appearance: She is well-developed. She is not ill-appearing.   HENT:      Head: Normocephalic.   Cardiovascular:      Rate and Rhythm: Normal rate and regular rhythm.      Heart sounds: No murmur heard.     Comments: Occasional bradycardia  Pulmonary:      Effort: Pulmonary effort is normal. No respiratory distress.      Breath sounds: Normal breath sounds.   Abdominal:      General: Bowel sounds are normal. There is no distension.      Palpations: Abdomen is soft.      Tenderness: There is no abdominal tenderness.   Musculoskeletal:         General: Tenderness and signs of injury present. No swelling.      Right lower leg: No edema.      Left lower leg: No edema.      Comments: Sling left arm   Skin:     General: Skin is warm and dry.      Findings: Bruising present.   Neurological:      General: No focal deficit present.      Mental Status: She is oriented to person, place, and time and easily  "aroused. Mental status is at baseline.      Cranial Nerves: No cranial nerve deficit.      Motor: Weakness present.      Gait: Gait normal.   Psychiatric:         Mood and Affect: Mood normal.          Invasive Devices       Peripheral Intravenous Line  Duration             Peripheral IV 01/30/24 Right Antecubital 2 days    Peripheral IV 02/01/24 Right Hand <1 day                    Lab, Imaging and other studies:    Lab Results:   I have personally reviewed pertinent lab results including the following:  -CBC, BMP, magnesium, phosphorus    I have personally reviewed the following imaging study reports in PACS:    - I have personally reviewed pertinent reports.      Please note:  Voice-recognition software may have been used in the preparation of this document.  Occasional wrong word or \"sound-alike\" substitutions may have occurred due to the inherent limitations of voice recognition software.  Interpretation should be guided by context.    "

## 2024-02-01 NOTE — ASSESSMENT & PLAN NOTE
Secondary to fall  CT imaging showed: Suspected nondisplaced left nasal bone fracture with overlying soft tissue swelling.   OMFS consulted and note appreciated. Non operative management  Analgesia as needed. Ice to face for the first 24 hours.  Nasal precautions  FU with OMFS as an OP

## 2024-02-01 NOTE — PROGRESS NOTES
[facility]  Progress Note  Name: Eulalio Raphael I  MRN: [id number]  Unit/Bed#: W -01 I Date of Admission: 1/30/2024   Date of Service: 2/1/2024 I Hospital Day: 1    Assessment/Plan   Fall  Assessment & Plan  Mechanical fall   Injuries listed below  PT/OT    Closed nondisplaced fracture of left clavicle  Assessment & Plan  Secondary to fall  CT chest showed left proximal clavicular fracture    Ortho consulted and note appreciated  NWB LLE  Multimodal pain regimen  PT/OT    * Closed left humeral fracture  Assessment & Plan  Secondary to fall  CT imaging showed: Comminuted fracture through the surgical neck of the humerus. The humeral head articular surface, greater tuberosity, lesser tuberosity and shaft are all separate fracture fragments. The greater and lesser tuberosities are not distinctly appreciated given the high degree of comminution. There is significant angulation between the humeral shaft and remaining fracture components  Ortho consulted and note appreciated.   2/1 surgical fixation of left proximal humerus fracture with a reverse total shoulder arthroplasty   NWB LLE--arm sling\  Multimodal pain regimen initiated.   Follow up with Ortho as an OP    Nasal bone fracture  Assessment & Plan  Secondary to fall  CT imaging showed: Suspected nondisplaced left nasal bone fracture with overlying soft tissue swelling.   OMFS consulted and note appreciated. Non operative management  Analgesia as needed. Ice to face for the first 24 hours.  Nasal precautions  FU with OMFS as an OP    Recurrent depression (HCC)  Assessment & Plan  Continue home paxil    Acquired hypothyroidism  Assessment & Plan  Continue home levothyroxine    Type 2 diabetes mellitus without complication, without long-term current use of insulin (HCC)  Assessment & Plan  Lab Results   Component Value Date    HGBA1C 6.2 (H) 01/27/2023       Recent Labs     01/31/24  1833 01/31/24  2057 02/01/24  0659 02/01/24  1202  "  POCGLU 158* 165* 171* 165*       Blood Sugar Average: Last 72 hrs:  (P) [id number]    Hold home metformin. SSI while IP      Essential hypertension  Assessment & Plan  Continue home amlodipine              TRAUMA TERTIARY SURVEY NOTE    VTE Prophylaxis:Sequential compression device (Venodyne)  and Enoxaparin (Lovenox)     Disposition: Pending PT/OT eval    Code status:  Level 1 - Full Code    Consultants: IP CONSULT TO ORAL AND MAXILLOFACIAL SURGERY  IP CONSULT TO ORTHOPEDIC SURGERY  IP CONSULT TO GERONTOLOGY  IP CONSULT TO CASE MANAGEMENT    Subjective     Mechanism of Injury:Fall     Chief Complaint: \"I'm ok\"    HPI/Last 24 hour events: This is a 78-year-old female who describes slipping and ultimately falling down approximately 15 steps resulting in multiple injuries as listed above.  I evaluated patient postoperatively following arthroplasty of her left shoulder.  Patient notes that her pain is under control and that she is able to fully sense light touch on all further fingers.  She denies any numbness or tingling.  She denies any headache, dizziness, visual disturbances-she does note some swelling around her left eye.  No other complaints offered.  She has been able to tolerate some oral fluids and notes just ordering lunch.  Daughter is at bedside and was updated on patient's status-she confirms that all of her questions and concerns were answered to her satisfaction.     Objective   Vitals:   Temp:  [97.2 °F (36.2 °C)-98 °F (36.7 °C)] 97.7 °F (36.5 °C)  HR:  [42-85] 51  Resp:  [18] 18  BP: (130-187)/() 134/58    I/O         01/30 0701 01/31 0700 01/31 0701  02/01 0700 02/01 0701  02/02 0700    I.V.   1700    IV Piggyback 1000  150    Total Intake 1000  1850    Urine  200 300    Blood   250    Total Output  200 550    Net +1000 -200 +1300                    Physical Exam:   GENERAL APPEARANCE: No acute distress.  Resting in bed  NEURO: GCS is 15.  Light touch sensation intact in all " extremities.  HEENT: Right periorbital region swollen.  EOMI.  Pupils are 3 mm and briskly reactive to light.  Neck is supple.  CV: Bradycardic rate and regular rhythm.  +2 radial dorsalis pedis pulses, bilaterally.  LUNGS: Clear to auscultation, bilaterally.    GI: Abdomen is soft nontender.    : Pelvis is stable  MSK: Left upper extremity is in a arm sling.  Patient is able to move all fingers on the left side.  Significant swelling in her left shoulder.  All other extremities are nontender and display full range of motion.  SKIN: Warm, dry.  Left shoulder surgical dressing is clean, dry, intact.    Invasive Devices       Peripheral Intravenous Line  Duration             Peripheral IV 01/30/24 Right Antecubital 2 days    Peripheral IV 02/01/24 Right Hand <1 day                        Lab Results: Results: I have personally reviewed all pertinent laboratory/tests results, BMP/CMP:   Lab Results   Component Value Date    SODIUM 136 02/01/2024    K 3.9 02/01/2024     02/01/2024    CO2 24 02/01/2024    BUN 15 02/01/2024    CREATININE 0.64 02/01/2024    CALCIUM 8.9 02/01/2024    EGFR 86 02/01/2024   , and CBC:   Lab Results   Component Value Date    WBC 7.71 02/01/2024    HGB 9.7 (L) 02/01/2024    HCT 30.0 (L) 02/01/2024    MCV 85 02/01/2024     (L) 02/01/2024    RBC 3.54 (L) 02/01/2024    MCH 27.4 02/01/2024    MCHC 32.3 02/01/2024    RDW 14.6 02/01/2024    MPV 10.2 02/01/2024    NRBC 0 02/01/2024       Imaging Results: I have personally reviewed pertinent reports.    Chest Xray(s): See below   FAST exam(s): N/A   CT Scan(s): See below   Additional Xray(s): See below     Other Studies:   XR humerus left    Result Date: 2/1/2024  Impression: Comminuted, displaced left proximal humerus fracture with little change in alignment from prior study. Workstation performed: [id number]     XR hand 3+ views LEFT    Result Date: 1/31/2024  Impression: No acute osseous abnormality. Workstation performed: [id number]  "    CT upper extremity wo contrast left    Result Date: 1/31/2024  Impression: 1.  Highly comminuted proximal humeral fracture as described 2.  Clavicular tip fracture Workstation performed: [id number]     XR chest 1 view portable    Result Date: 1/31/2024  Impression: No focal consolidation or pneumothorax. Left shoulder and clavicular fractures. Thyroid goiter.. Workstation performed: [id number]     XR shoulder 2+ views LEFT    Result Date: 1/31/2024  Impression: Comminuted left shoulder fracture as detailed above.. Workstation performed: [id number]     XR forearm 2 views LEFT    Result Date: 1/31/2024  Impression: Comminuted left shoulder fracture as detailed above.. Workstation performed: [id number]     XR humerus LEFT    Result Date: 1/31/2024  Impression: 1.  Comminuted proximal humeral fracture 2.  Likely coracoid tip fracture Workstation performed: [id number]     XR hip/pelv 2-3 vws left    Result Date: 1/31/2024  Impression: No acute osseous abnormality. Workstation performed: [id number]     CT chest wo contrast    Result Date: 1/30/2024  Impression: 1.  Left proximal clavicular fracture 2.  Left proximal humeral fracture 3.  Thyroid goiter with resolution of rightward tracheal deviation Workstation performed: [id number]     CT head without contrast    Result Date: 1/30/2024  Impression: Progression of now moderate chronic microangiopathic change with no acute intracranial abnormality identified. Suspected nondisplaced left nasal bone fracture with overlying soft tissue swelling. There is hyperdense material layering within the left maxillary sinus which could represent hemorrhage within the sinus, sequela of an occult nondisplaced fracture. This examination was marked \"immediate notification\" in Epic in order to begin the standard process by which the radiology reading room liaison alerts the referring practitioner. Workstation performed: [id number]     CT cervical spine without contrast    Result Date: " 1/30/2024  Impression: No cervical spine fracture or traumatic malalignment. Multinodular goiter with multiple calcified right thyroid nodules. Incompletely visualized soft tissue mass in the right upper thorax, possibly extrapleural for which a dedicated CT chest with contrast is recommended. The study was marked in EPIC for significant notification. Workstation performed: [id number]

## 2024-02-01 NOTE — DISCHARGE INSTR - AVS FIRST PAGE
Discharge Instructions - Orthopedics  Eulalio Raphael 78 y.o. female MRN: [id number]  Unit/Bed#: PACU 2    Weight Bearing Status:                                           Patient will be strict  nonweightbearing to left upper extremity in abduction sling at all times for the next 2 weeks     Pain:  Continue analgesics as directed    Dressing Instructions:   Please keep clean, dry and intact until follow up     Appt Instructions:   If you do not have your appointment, please call the clinic at [phone] to schedule with Dr. Kaufman  Otherwise follow up as scheduled.    Contact the office sooner if you experience any increased numbness/tingling in the extremities.

## 2024-02-01 NOTE — PROGRESS NOTES
Progress Note - Orthopedics   Eulalio Raphael 78 y.o. female MRN: [id number]  Unit/Bed#: APU 4      Subjective:    78 y.o.female seen and evaluated in the preop holding area.  States she still experiences pain in the left shoulder status post fall downstairs yesterday 1/31/24.  She is eager for the OR today.  No other acute complaints at this time.  Denies numbness tingling or paresthesias in all 4 extremities.    Labs:  0   Lab Value Date/Time    HCT 30.0 (L) 02/01/2024 0618    HCT 37.1 01/31/2024 0645    HCT 42.7 01/30/2024 1930    HGB 9.7 (L) 02/01/2024 0618    HGB 11.9 01/31/2024 0645    HGB 13.7 01/30/2024 1930    INR 0.96 01/31/2024 0645    WBC 7.71 02/01/2024 0618    WBC 6.79 01/31/2024 0645    WBC 9.82 01/30/2024 1930       Meds:    Current Facility-Administered Medications:     [Transfer Hold] acetaminophen (TYLENOL) tablet 975 mg, 975 mg, Oral, Q8H MAGALI, Veronica Callejas MD, 975 mg at 02/01/24 0617    [Transfer Hold] amLODIPine (NORVASC) tablet 10 mg, 10 mg, Oral, Daily, Veronica Callejas MD, 10 mg at 01/31/24 0909    bupivacaine liposomal (EXPAREL) 1.3 % injection 20 mL, 20 mL, Infiltration, Once, Brock Marquis MD    ceFAZolin (ANCEF) IVPB (premix in dextrose) 2,000 mg 50 mL, 2,000 mg, Intravenous, On Call To OR, Beth Lucas PA-C    [Transfer Hold] enoxaparin (LOVENOX) subcutaneous injection 30 mg, 30 mg, Subcutaneous, Q12H, Veronica Callejas MD, 30 mg at 02/01/24 0046    [Transfer Hold] HYDROmorphone HCl (DILAUDID) injection 0.2 mg, 0.2 mg, Intravenous, Q2H PRN, Veronica Callejas MD, 0.2 mg at 01/31/24 1833    [Transfer Hold] insulin lispro (HumaLOG) 100 units/mL subcutaneous injection 1-5 Units, 1-5 Units, Subcutaneous, 4x Daily, 1 Units at 01/31/24 0148 **AND** Fingerstick Glucose (POCT), , , 4x Daily, Veronica Callejas MD    [Transfer Hold] levothyroxine tablet 25 mcg, 25 mcg, Oral, Early Morning, Veronica Callejas MD, 25 mcg at 02/01/24 0617    [Transfer Hold] lidocaine  "(LIDODERM) 5 % patch 1 patch, 1 patch, Topical, Daily, Veronica Callejas MD, 1 patch at 01/31/24 0909    [Transfer Hold] naloxone (NARCAN) 0.04 mg/mL syringe 0.04 mg, 0.04 mg, Intravenous, Q1MIN PRN, Veronica Callejas MD    [Transfer Hold] ondansetron (ZOFRAN) injection 4 mg, 4 mg, Intravenous, Q8H PRN, Veronica Callejas MD    [Transfer Hold] oxyCODONE (ROXICODONE) split tablet 2.5 mg, 2.5 mg, Oral, Q4H PRN **OR** [Transfer Hold] oxyCODONE (ROXICODONE) IR tablet 5 mg, 5 mg, Oral, Q4H PRN, Veronica Callejas MD, 5 mg at 02/01/24 0618    [Transfer Hold] PARoxetine (PAXIL) tablet 20 mg, 20 mg, Oral, Daily, Veronica Callejas MD, 20 mg at 01/31/24 0909    [Transfer Hold] polyethylene glycol (MIRALAX) packet 17 g, 17 g, Oral, Daily, Veronica Callejas MD    [Transfer Hold] senna (SENOKOT) tablet 17.2 mg, 2 tablet, Oral, Daily, Veronica Callejas MD, 17.2 mg at 01/31/24 0910    Blood Culture:   No results found for: \"BLOODCX\"    Wound Culture:   No results found for: \"WOUNDCULT\"    Ins and Outs:  I/O last 24 hours:  In: -   Out: 200 [Urine:200]          Physical:  Vitals:    02/01/24 0644   BP: 150/68   Pulse: 55   Resp: 18   Temp: 98 °F (36.7 °C)   SpO2: 98%     Musculoskeletal: left Upper Extremity  Visible skin intact. No erythema or ecchymosis.  Splint clean dry and intact over the left upper extremity.  Sling in place at time of examination  TTP throughout left upper arm towards the elbow  Sensation intact to median/radial/ulnar nerve distribution   Motor intact anterior interosseous nerve/posterior interosseous nerve/median/radial/ulnar nerve distributions  2+ radial pulse comparable to contralateral side  Compartments soft and compressible  Digits warm and well perfused  Capillary refill < 2 seconds    Assessment:    78 y.o.female status post fall downstairs with highly comminuted proximal humeral fracture, set to undergo reverse total shoulder arthroplasty today 2/1/2024.     Plan:  Nonweightbearing to left " upper extremity in sling  Plan for ORIF today 2/1/2024  NPO status confirmed  Preoperative antibiotics ordered  Consent on file  Hgb: 9.7 Will monitor for ABLA and administer IVF/prbc as indicated for Greater than 2 gram drop or Hgb < 7   PT/OT  Pain control per primary team  DVT ppx Per primary team  All other medical comorbidities to be managed per primary team at this time  Dispo: Ortho will follow    Chris Gordillo PA-C

## 2024-02-01 NOTE — UTILIZATION REVIEW
NOTIFICATION OF INPATIENT ADMISSION   AUTHORIZATION REQUEST   SERVICING FACILITY:   [place] [address]  Tax ID: [id number]  NPI: [id number]   ATTENDING PROVIDER:  Attending Name and NPI#: Júnior Bond Do [[id number]]  Address: [address] [place] [address]  Phone: [phone]     ADMISSION INFORMATION:  Place of Service: Inpatient [facility]  Place of Service Code: 21  Inpatient Admission Date/Time: 1/31/24 12:54 AM  Discharge Date/Time: No discharge date for patient encounter.  Admitting Diagnosis Code/Description:  Generalized anxiety disorder [F41.1]  Acquired hypothyroidism [E03.9]  Recurrent depression (HCC) [F33.9]  Closed fracture of nasal bone, initial encounter [S02.2XXA]  Closed displaced fracture of surgical neck of left humerus, unspecified fracture morphology, initial encounter [S42.212A]  Fracture of unspecified part of left clavicle, initial encounter for closed fracture [S42.002A]  Type 2 diabetes mellitus without complication, without long-term current use of insulin (HCC) [E11.9]  Lymphedema of left arm [I89.0]     UTILIZATION REVIEW CONTACT:  Bran Wolf, Utilization   Network Utilization Review Department  Phone: [phone]  Fax: [phone]  Email: [email or web address]  Contact for approvals/pending authorizations, clinical reviews, and discharge.     PHYSICIAN ADVISORY SERVICES:  Medical Necessity Denial & [id number] Review  Phone: [phone]  Fax: [phone]  Email: [email or web address]     DISCHARGE SUPPORT TEAM:  For Patients Discharge Needs & Updates  Phone: [phone] opt. 2 Fax: [phone]  Email: [email or web address]

## 2024-02-02 LAB
ANION GAP SERPL CALCULATED.3IONS-SCNC: 5 MMOL/L
BASOPHILS # BLD AUTO: 0.01 THOUSANDS/ÂΜL (ref 0–0.1)
BASOPHILS NFR BLD AUTO: 0 % (ref 0–1)
BUN SERPL-MCNC: 21 MG/DL (ref 5–25)
CALCIUM SERPL-MCNC: 9.1 MG/DL (ref 8.4–10.2)
CHLORIDE SERPL-SCNC: 103 MMOL/L (ref 96–108)
CO2 SERPL-SCNC: 26 MMOL/L (ref 21–32)
CREAT SERPL-MCNC: 0.99 MG/DL (ref 0.6–1.3)
DME PARACHUTE DELIVERY DATE REQUESTED: NORMAL
DME PARACHUTE ITEM DESCRIPTION: NORMAL
DME PARACHUTE ITEM DESCRIPTION: NORMAL
DME PARACHUTE ORDER STATUS: NORMAL
DME PARACHUTE SUPPLIER NAME: NORMAL
DME PARACHUTE SUPPLIER PHONE: NORMAL
EOSINOPHIL # BLD AUTO: 0 THOUSAND/ÂΜL (ref 0–0.61)
EOSINOPHIL NFR BLD AUTO: 0 % (ref 0–6)
ERYTHROCYTE [DISTWIDTH] IN BLOOD BY AUTOMATED COUNT: 14.7 % (ref 11.6–15.1)
GFR SERPL CREATININE-BSD FRML MDRD: 55 ML/MIN/1.73SQ M
GLUCOSE SERPL-MCNC: 168 MG/DL (ref 65–140)
GLUCOSE SERPL-MCNC: 171 MG/DL (ref 65–140)
GLUCOSE SERPL-MCNC: 242 MG/DL (ref 65–140)
GLUCOSE SERPL-MCNC: 245 MG/DL (ref 65–140)
GLUCOSE SERPL-MCNC: 323 MG/DL (ref 65–140)
HCT VFR BLD AUTO: 24.1 % (ref 34.8–46.1)
HGB BLD-MCNC: 7.9 G/DL (ref 11.5–15.4)
IMM GRANULOCYTES # BLD AUTO: 0.06 THOUSAND/UL (ref 0–0.2)
IMM GRANULOCYTES NFR BLD AUTO: 1 % (ref 0–2)
LYMPHOCYTES # BLD AUTO: 1.21 THOUSANDS/ÂΜL (ref 0.6–4.47)
LYMPHOCYTES NFR BLD AUTO: 11 % (ref 14–44)
MCH RBC QN AUTO: 27.8 PG (ref 26.8–34.3)
MCHC RBC AUTO-ENTMCNC: 32.8 G/DL (ref 31.4–37.4)
MCV RBC AUTO: 85 FL (ref 82–98)
MONOCYTES # BLD AUTO: 1.53 THOUSAND/ÂΜL (ref 0.17–1.22)
MONOCYTES NFR BLD AUTO: 13 % (ref 4–12)
NEUTROPHILS # BLD AUTO: 8.59 THOUSANDS/ÂΜL (ref 1.85–7.62)
NEUTS SEG NFR BLD AUTO: 75 % (ref 43–75)
NRBC BLD AUTO-RTO: 0 /100 WBCS
PLATELET # BLD AUTO: 149 THOUSANDS/UL (ref 149–390)
PMV BLD AUTO: 10 FL (ref 8.9–12.7)
POTASSIUM SERPL-SCNC: 4.2 MMOL/L (ref 3.5–5.3)
RBC # BLD AUTO: 2.84 MILLION/UL (ref 3.81–5.12)
SODIUM SERPL-SCNC: 134 MMOL/L (ref 135–147)
WBC # BLD AUTO: 11.4 THOUSAND/UL (ref 4.31–10.16)

## 2024-02-02 PROCEDURE — NC001 PR NO CHARGE: Performed by: STUDENT IN AN ORGANIZED HEALTH CARE EDUCATION/TRAINING PROGRAM

## 2024-02-02 PROCEDURE — 82948 REAGENT STRIP/BLOOD GLUCOSE: CPT

## 2024-02-02 PROCEDURE — 97760 ORTHOTIC MGMT&TRAING 1ST ENC: CPT

## 2024-02-02 PROCEDURE — 97116 GAIT TRAINING THERAPY: CPT

## 2024-02-02 PROCEDURE — 85025 COMPLETE CBC W/AUTO DIFF WBC: CPT | Performed by: NURSE PRACTITIONER

## 2024-02-02 PROCEDURE — 97535 SELF CARE MNGMENT TRAINING: CPT

## 2024-02-02 PROCEDURE — 99232 SBSQ HOSP IP/OBS MODERATE 35: CPT

## 2024-02-02 PROCEDURE — 99232 SBSQ HOSP IP/OBS MODERATE 35: CPT | Performed by: SURGERY

## 2024-02-02 PROCEDURE — 99024 POSTOP FOLLOW-UP VISIT: CPT | Performed by: PHYSICIAN ASSISTANT

## 2024-02-02 PROCEDURE — 97167 OT EVAL HIGH COMPLEX 60 MIN: CPT

## 2024-02-02 PROCEDURE — 99232 SBSQ HOSP IP/OBS MODERATE 35: CPT | Performed by: NURSE PRACTITIONER

## 2024-02-02 PROCEDURE — 97163 PT EVAL HIGH COMPLEX 45 MIN: CPT

## 2024-02-02 PROCEDURE — 80048 BASIC METABOLIC PNL TOTAL CA: CPT | Performed by: NURSE PRACTITIONER

## 2024-02-02 RX ORDER — LANOLIN ALCOHOL/MO/W.PET/CERES
3 CREAM (GRAM) TOPICAL
Status: DISCONTINUED | OUTPATIENT
Start: 2024-02-02 | End: 2024-02-03 | Stop reason: HOSPADM

## 2024-02-02 RX ADMIN — INSULIN LISPRO 1 UNITS: 100 INJECTION, SOLUTION INTRAVENOUS; SUBCUTANEOUS at 08:35

## 2024-02-02 RX ADMIN — AMLODIPINE BESYLATE 10 MG: 10 TABLET ORAL at 08:37

## 2024-02-02 RX ADMIN — LIDOCAINE 5% 1 PATCH: 700 PATCH TOPICAL at 08:37

## 2024-02-02 RX ADMIN — ENOXAPARIN SODIUM 30 MG: 30 INJECTION SUBCUTANEOUS at 00:19

## 2024-02-02 RX ADMIN — OXYCODONE HYDROCHLORIDE 5 MG: 5 TABLET ORAL at 00:20

## 2024-02-02 RX ADMIN — INSULIN LISPRO 2 UNITS: 100 INJECTION, SOLUTION INTRAVENOUS; SUBCUTANEOUS at 21:50

## 2024-02-02 RX ADMIN — POLYETHYLENE GLYCOL 3350 17 G: 17 POWDER, FOR SOLUTION ORAL at 08:37

## 2024-02-02 RX ADMIN — INSULIN LISPRO 3 UNITS: 100 INJECTION, SOLUTION INTRAVENOUS; SUBCUTANEOUS at 17:10

## 2024-02-02 RX ADMIN — PAROXETINE HYDROCHLORIDE 20 MG: 20 TABLET, FILM COATED ORAL at 08:37

## 2024-02-02 RX ADMIN — ACETAMINOPHEN 975 MG: 325 TABLET, FILM COATED ORAL at 05:33

## 2024-02-02 RX ADMIN — SENNOSIDES 17.2 MG: 8.6 TABLET, FILM COATED ORAL at 08:37

## 2024-02-02 RX ADMIN — CEFAZOLIN SODIUM 2000 MG: 2 SOLUTION INTRAVENOUS at 00:19

## 2024-02-02 RX ADMIN — OXYCODONE HYDROCHLORIDE 5 MG: 5 TABLET ORAL at 16:40

## 2024-02-02 RX ADMIN — ACETAMINOPHEN 975 MG: 325 TABLET, FILM COATED ORAL at 16:40

## 2024-02-02 RX ADMIN — ENOXAPARIN SODIUM 30 MG: 30 INJECTION SUBCUTANEOUS at 13:03

## 2024-02-02 RX ADMIN — INSULIN LISPRO 2 UNITS: 100 INJECTION, SOLUTION INTRAVENOUS; SUBCUTANEOUS at 13:07

## 2024-02-02 RX ADMIN — LEVOTHYROXINE SODIUM 25 MCG: 25 TABLET ORAL at 05:33

## 2024-02-02 NOTE — PLAN OF CARE
Problem: PAIN - ADULT  Goal: Verbalizes/displays adequate comfort level or baseline comfort level  Description: Interventions:  - Encourage patient to monitor pain and request assistance  - Assess pain using appropriate pain scale  - Administer analgesics based on type and severity of pain and evaluate response  - Implement non-pharmacological measures as appropriate and evaluate response  - Consider cultural and social influences on pain and pain management  - Notify physician/advanced practitioner if interventions unsuccessful or patient reports new pain  Outcome: Progressing     Problem: INFECTION - ADULT  Goal: Absence or prevention of progression during hospitalization  Description: INTERVENTIONS:  - Assess and monitor for signs and symptoms of infection  - Monitor lab/diagnostic results  - Monitor all insertion sites, i.e. indwelling lines, tubes, and drains  - Monitor endotracheal if appropriate and nasal secretions for changes in amount and color  - [place] appropriate cooling/warming therapies per order  - Administer medications as ordered  - Instruct and encourage patient and family to use good hand hygiene technique  - Identify and instruct in appropriate isolation precautions for identified infection/condition  Outcome: Progressing  Goal: Absence of fever/infection during neutropenic period  Description: INTERVENTIONS:  - Monitor WBC    Outcome: Progressing     Problem: SAFETY ADULT  Goal: Patient will remain free of falls  Description: INTERVENTIONS:  - Educate patient/family on patient safety including physical limitations  - Instruct patient to call for assistance with activity   - Consult OT/PT to assist with strengthening/mobility   - Keep Call bell within reach  - Keep bed low and locked with side rails adjusted as appropriate  - Keep care items and personal belongings within reach  - Initiate and maintain comfort rounds  - Make Fall Risk Sign visible to staff  - Offer Toileting every  Hours,  in advance of need  - Initiate/Maintain alarm  - Obtain necessary fall risk management equipment:   - Apply yellow socks and bracelet for high fall risk patients  - Consider moving patient to room near nurses station  Outcome: Progressing  Goal: Maintain or return to baseline ADL function  Description: INTERVENTIONS:  -  Assess patient's ability to carry out ADLs; assess patient's baseline for ADL function and identify physical deficits which impact ability to perform ADLs (bathing, care of mouth/teeth, toileting, grooming, dressing, etc.)  - Assess/evaluate cause of self-care deficits   - Assess range of motion  - Assess patient's mobility; develop plan if impaired  - Assess patient's need for assistive devices and provide as appropriate  - Encourage maximum independence but intervene and supervise when necessary  - Involve family in performance of ADLs  - Assess for home care needs following discharge   - Consider OT consult to assist with ADL evaluation and planning for discharge  - Provide patient education as appropriate  Outcome: Progressing  Goal: Maintains/Returns to pre admission functional level  Description: INTERVENTIONS:  - Perform AM-PAC 6 Click Basic Mobility/ Daily Activity assessment daily.  - Set and communicate daily mobility goal to care team and patient/family/caregiver.   - Collaborate with rehabilitation services on mobility goals if consulted  - Perform Range of Motion  times a day.  - Reposition patient every  hours.  - Dangle patient  times a day  - Stand patient  times a day  - Ambulate patient  times a day  - Out of bed to chair  times a day   - Out of bed for meals times a day  - Out of bed for toileting  - Record patient progress and toleration of activity level   Outcome: Progressing

## 2024-02-02 NOTE — OCCUPATIONAL THERAPY NOTE
Occupational Therapy Evaluation     Patient Name: Eulalio Raphael  Today's Date: 2/2/2024  Problem List  Principal Problem:    Closed left humeral fracture  Active Problems:    Essential hypertension    Type 2 diabetes mellitus without complication, without long-term current use of insulin (HCC)    Acquired hypothyroidism    Recurrent depression (HCC)    Closed nondisplaced fracture of left clavicle    Fall    Nasal bone fracture    Past Medical History  Past Medical History:   Diagnosis Date    Breast cancer (HCC)     Hypertension      Past Surgical History  Past Surgical History:   Procedure Laterality Date    HYSTERECTOMY      MASTECTOMY MODIFIED RADICAL Bilateral     REVERSE TOTAL SHOULDER ARTHROPLASTY Left 2/1/2024    Procedure: ARTHROPLASTY SHOULDER REVERSE, and all associated procedures;  Surgeon: Tigre Kaufman DO;  Location: [facility];  Service: Orthopedics    SPINE SURGERY      THYROIDECTOMY N/A 6/21/2023    Procedure: LEFT HEMITHYROIDECTOMY, SUBSTERNAL;  Surgeon: Leonel James MD;  Location: [facility];  Service: ENT        02/02/24 0924   OT Last Visit   OT Visit Date 02/02/24   Note Type   Note type Evaluation   Pain Assessment   Pain Assessment Tool 0-10   Pain Score No Pain   Restrictions/Precautions   Weight Bearing Precautions Per Order Yes   LUE Weight Bearing Per Order (S)  NWB   Braces or Orthoses Sling  (L UE Abduction sling)   Other Precautions Fall Risk;WBS;Chair Alarm;Bed Alarm;Limb alert  (jovanny)   Type of Brace   Bracing Recommendations   (Wear at all times)   Home Living   Type of Home House   Home Layout Multi-level;Able to live on main level with bedroom/bathroom;Stairs to enter with rails;Elevator  (3 [address] home c B/L railing; shower in basement c steps vs elevator)   Bathroom Shower/Tub Walk-in shower   Bathroom Toilet Standard   Bathroom Equipment Other (Comment)  (pt reports no AE)   Bathroom Accessibility Not accessible   Home Equipment Other (Comment)  (pt reports no  "AD)   Additional Comments pt lives at home w family available for help during the day. uses no AD to navigate household distances   Prior Function   Level of [place] Independent with ADLs;Independent with functional mobility;Needs assistance with IADLS   Lives With Family;Daughter  (grandchildren)   Receives Help From Family  (daughter)   IADLs Family/Friend/Other provides transportation;Family/Friend/Other provides meals;Independent with medication management  (pt reports she occasionally cooks and does laundry)   Falls in the last 6 months 1 to 4  (pt reports one other fall; Daughter at bedside reports pt has had 2 falls at the steps)   Vocational Retired   Lifestyle   Autonomy at baseline pt is (I) with ADL's and functional mobiltiy and needs A with IADL's. No AD at baseline. (+) falls and retired.   Reciprocal Relationships Daughter at bedside   Intrinsic Gratification enjoys reading   General   Additional Pertinent History Pt admitted d/t fall. DX: Closed left humeral fracture, L clavicle fracture, and nasal fracture. Pt underwent reverse total shoulder arthroplasty 2/1/2024. PMHx: HTN, DM2, hx. breast cancer, lymphedema L UE.   Family/Caregiver Present Yes  (daughter at bedside)   Subjective   Subjective \"I feel like I did well\"   ADL   Where Assessed Edge of bed   Eating Assistance 7  Independent   Grooming Assistance 3  Moderate Assistance   UB Bathing Assistance 3  Moderate Assistance   LB Bathing Assistance 3  Moderate Assistance   UB Dressing Assistance 4  Minimal Assistance   LB Dressing Assistance 3  Moderate Assistance   Toileting Assistance  4  Minimal Assistance   Functional Assistance 4  Minimal Assistance  (CGA c quad cane)   Functional Deficit Verbal cueing;Supervision/safety;Increased time to complete   Additional Comments eating, grooming, bathing, dressing, and toileting not fornally assessed. The above levels are anticipated based on functional performance and clinical judgement.   Bed " Mobility   Additional Comments pt received sitting EOB. Pt left OOB in recliner   Transfers   Sit to Stand 4  Minimal assistance   Additional items Assist x 1;Increased time required;Verbal cues  (CGA; VC for safe hand placements)   Stand to Sit 4  Minimal assistance   Additional items Assist x 1;Increased time required;Verbal cues  (VC for safety awareness and body mechanics)   Additional Comments pt [id number] STS at EOB; 1 stand to sit at recliner   Functional Mobility   Functional Mobility 4  Minimal assistance   Additional Comments CGA x 1 ; pt performed functional mobiltiy from EOB household distances c quad cane; pt performed steps with PT; 2 LOB during ambulation noted   Additional items   (QUAD cane)   Balance   Static Sitting Fair +   Dynamic Sitting Fair   Static Standing Fair -   Dynamic Standing Poor +   Activity Tolerance   Activity Tolerance Patient limited by fatigue   Medical Staff Made Aware ROBERTO Millan; care coordinated with PT RJ   Nurse Made Aware JEWELS MCDONALD Assessment   RUE Assessment WFL  (MMT grossly 3+/5 throughout)   LUE Assessment   LUE Assessment X  (not assessed d/t NWB)   Hand Function   Gross Motor Coordination Functional   Fine Motor Coordination Functional   Sensation   Light Touch No apparent deficits   Vision-Basic Assessment   Current Vision Wears glasses only for reading   Vision - Complex Assessment   Acuity Able to read employee name badge without difficulty   Cognition   Overall Cognitive Status WFL   Arousal/Participation Alert;Cooperative   Attention Within functional limits   Orientation Level Oriented X4   Memory Within functional limits   Following Commands Follows one step commands without difficulty   Comments pt ID by name and ; pt able to recall fall leading to admission and verbalize WBS 2/2 times   Assessment   Limitation Decreased ADL status;Non-func L UE;Decreased endurance;Decreased Safe judgement during ADL;Decreased UE strength  (balance, activity  tolerance)   Prognosis Good   Assessment Pt is 78 year ol female seen by Occupational therapy at [facility] following admission on 01/30/2024 d/t fall. Dx: closed left humeral fracture, clavicle fracture, nasal fracture. Pt underwent reverse total shoulder arthroplasty 2/1/2024. Pt participated in an OT evaluation on this date 02/02/2024. Please see above for comprehensive list of PMHx impacting functional performance. Upon evaluations pt is performing below baseline.Pt presents with the following deficits decreased balance, decreased endurance, decreased strength, decreased activity tolerance.Pt occupational performance is supported by supportive family, PLOF, attitude towards recovery. Barriers to discharge include (+) falls and WBS. Pt would benefit from skilled occupational therapy to address: ADL retraining, activity engagement, balance, and education for compensatory dressing to maximize occupational performance and increase independence. At time of discharge pt is recommended level ll moderate resource intensity.   Goals   Patient Goals to go home   Plan   Treatment Interventions ADL retraining;Activityengagement;Compensatory technique education;Equipment evaluation/education;Patient/family training;Endurance training;Functional transfer training   Goal Expiration Date 02/12/24   OT Treatment Day 0   OT Frequency 3-5x/wk   Discharge Recommendation   Rehab Resource Intensity Level, OT II (Moderate Resource Intensity)   Equipment Recommended Bedside commode   Commode Type Standard   Additional Comments  The patient's raw score on the AM-PAC Daily Activity Inpatient Short Form is 16. A raw score of less than 19 suggests the patient may benefit from discharge to post-acute rehabilitation services. Please refer to the recommendation of the Occupational Therapist for safe discharge planning.   AM-PAC Daily Activity Inpatient   Lower Body Dressing 2   Bathing 2   Toileting 3   Upper Body Dressing 2    Grooming 3   Eating 4   Daily Activity Raw Score 16   Daily Activity Standardized Score (Calc for Raw Score >=11) 35.96   AM-PAC Applied Cognition Inpatient   Following a Speech/Presentation 4   Understanding Ordinary Conversation 4   Taking Medications 4   Remembering Where Things Are Placed or Put Away 4   Remembering List of 4-5 Errands 3   Taking Care of Complicated Tasks 2   Applied Cognition Raw Score 21   Applied Cognition Standardized Score 44.3   End of Consult   Education Provided Yes;Family or social support of family present for education by provider   Patient Position at End of Consult Bedside chair;Bed/Chair alarm activated;All needs within reach   Nurse Communication Nurse aware of consult     Pt will tolerate therapeutic activity for 30 minutes to increase activity tolerance for ADL participation within 10 days     Pt will tolerate sitting up in the recliner chair 2-3 hours per day to increase activity engagement for ADL participation within 10 days     Pt will perform functional mobility community distances S for safety c LRAD to increase activity tolerance and occupational performance for ADL's within 10 days    Pt will demonstrate compliance of WBS during 100% of treatment sessions to increase safety and POC compliance during ADL's within 10 days    Pt will perform UB ADL's with Min A to increase occupational performance for ADL's within 10 days     Pt will demonstrate the ability to don/doff sling (S) and no more than 2 VC to increase safety and WBS compliance during ADL's within 10 days     Pt will perform LB ADL's with Min A to increase occupational performance and increase independence for ADL's within 10 days      Rian Hernández

## 2024-02-02 NOTE — NURSING NOTE
Patient fell off of chair at 1245. Patient was trying to reach her phone that fell on the floor and slipped off the chair on to the ground on her bottom. Fall was unwitnessed. ROSEANN notified by RN Alem Hannon who was in the brown and her patient call out.     I assessed patient when I arrived in the room minutes later and her vital signs were stable. She was not complaining of any pain, no numbness, no tingling in any extremities. Patient was fully coherent and completely alert and oriented.     Patient was helped by into her chair by myself and the following staff:  Sukhwinder VALERO RN    Patient could stand up with assistance and walk back to chair.     Trauma (Glen) was notified and no further workup was ordered for patient.     Zee Lieberman LPN

## 2024-02-02 NOTE — ASSESSMENT & PLAN NOTE
Lab Results   Component Value Date    HGBA1C 6.2 (H) 01/27/2023       Recent Labs     02/01/24  1248 02/01/24  1522 02/01/24  2146 02/02/24  0806   POCGLU 183* 205* 245* 168*         Blood Sugar Average: Last 72 hrs:  (P) 169    Hold home metformin. SSI while IP

## 2024-02-02 NOTE — ASSESSMENT & PLAN NOTE
Secondary to fall  CT imaging showed: Comminuted fracture through the surgical neck of the humerus. The humeral head articular surface, greater tuberosity, lesser tuberosity and shaft are all separate fracture fragments. The greater and lesser tuberosities are not distinctly appreciated given the high degree of comminution. There is significant angulation between the humeral shaft and remaining fracture components  Ortho consulted and note appreciated.   2/1 surgical fixation of left proximal humerus fracture with a reverse total shoulder arthroplasty   NWB LLE--arm sling  Multimodal pain regimen initiated.   Follow up with Ortho as an OP

## 2024-02-02 NOTE — OCCUPATIONAL THERAPY NOTE
Occupational Therapy Treatment Note     Patient Name: Eulalio Raphael  Today's Date: 2/2/2024  Problem List  Principal Problem:    Closed left humeral fracture  Active Problems:    Essential hypertension    Type 2 diabetes mellitus without complication, without long-term current use of insulin (HCC)    Acquired hypothyroidism    Recurrent depression (HCC)    Closed nondisplaced fracture of left clavicle    Fall    Nasal bone fracture          02/02/24 1532   OT Last Visit   OT Visit Date 02/02/24   Note Type   Note Type Orthotic Management/Training   Pain Assessment   Pain Assessment Tool 0-10   Pain Score No Pain   Restrictions/Precautions   LUE Weight Bearing Per Order (S)  NWB   Braces or Orthoses   (LUE Abduction sling)   Other Precautions Chair Alarm;Bed Alarm;WBS;Fall Risk;Limb alert  (masimo)   Type of Brace   Brace Applied Shoulder Abduction with Pillow   Additional Brace Ordered   (attempted to refit for DON STEVIE abduction sling size L d/t breggs abduction brace (MD) slightly small. Unable to achieve better fit with DONJOY, re fitted breggs sling.)   Patient Position When Brace Applied Seated  (recliner)   Bracing Recommendations Recommendation (comment)  (sling to be worn at all times, remove only for dressing and bathing)   Lifestyle   Autonomy at baseline pt is (I) with ADL's and functional mobiltiy and needs A with IADL's. No AD at baseline. (+) falls and retired.   Reciprocal Relationships Daughter at bedside   Service to Others retired   Intrinsic Gratification enjoys reading   ADL   Where Assessed Chair   UB Dressing Assistance   (provided visual demonstration of hemidressing techniques to pt and daughter. both decline practicing at this time, verbalize understanding on techniques. Discussed optimal types of shirts for simplifying dressing techniques)   Cognition   Overall Cognitive Status WFL   Arousal/Participation Alert;Cooperative   Attention Within functional limits   Orientation Level Oriented  to person   Following Commands Follows one step commands with increased time or repetition   Comments pt actively engaged in learning activities, daughter at bedside to A with carryover   Additional Activities   Additional Activities Other (Comment)  (family/caregiver trainining)   Additional Activities Comments Provided visual demonstration of donning/doffing abduction sling. Daughter requesting to trial for caregiver training. demonstrates ability to don/doff with VCs only for therapist. provided TSA handout and reviewed precautions/WBS/positioning techniques. pt and daughter verbalized understanding on all techniques.   Activity Tolerance   Activity Tolerance Patient tolerated treatment well   Medical Staff Made Aware Spoke with JWEELS Koch. Ortho PA-C Beth   Assessment   Assessment Patient seen for OT treatment on 2/2/2024 s/p admission for Closed left humeral fracture Patient agreeable to OT session. Patient participated in hemidressing techniques , abduction sling mgmt education, patient/caregiver education and with intervention focus on maximizing functional independence during ADL tasks, patient safety. Pt's daughter (caregiver) demonstrates improved understanding of sling management and TSA precautions.  Personal factors continuing to impact D/C include: Assistance needed for ADLs and functional mobility, From OT standpoint, patient would benefit from skilled intervention to maximize independence with ADLs and functional mobility. Goals remain appropriate, continue POC. At this time, recommending D/C to: Level 2: moderate resource intensity   Plan   Treatment Interventions Compensatory technique education;Equipment evaluation/education;Patient/family training   Goal Expiration Date 02/12/24   OT Treatment Day 1   OT Frequency 3-5x/wk   Discharge Recommendation   Rehab Resource Intensity Level, OT II (Moderate Resource Intensity)   Equipment Recommended Bedside commode   Commode Type Standard   Additional  Comments 2 The patient's raw score on the AM-PAC Daily Activity Inpatient Short Form is 16. A raw score of less than 19 suggests the patient may benefit from discharge to post-acute rehabilitation services. Please refer to the recommendation of the Occupational Therapist for safe discharge planning.   AM-PAC Daily Activity Inpatient   Lower Body Dressing 2   Bathing 2   Toileting 3   Upper Body Dressing 2   Grooming 3   Eating 4   Daily Activity Raw Score 16   Daily Activity Standardized Score (Calc for Raw Score >=11) 35.96   AM-PAC Applied Cognition Inpatient   Following a Speech/Presentation 4   Understanding Ordinary Conversation 4   Taking Medications 4   Remembering Where Things Are Placed or Put Away 4   Remembering List of 4-5 Errands 3   Taking Care of Complicated Tasks 2   Applied Cognition Raw Score 21   Applied Cognition Standardized Score 44.3   End of Consult   Education Provided Yes;Family or social support of family present for education by provider  (daughter)   Patient Position at End of Consult Bedside chair;Bed/Chair alarm activated;All needs within reach   Nurse Communication Nurse aware of brace application;Nurse aware of consult  (Zee RN)       Pt will tolerate therapeutic activity for 30 minutes to increase activity tolerance for ADL participation within 10 days      Pt will tolerate sitting up in the recliner chair 2-3 hours per day to increase activity engagement for ADL participation within 10 days      Pt will perform functional mobility community distances S for safety c LRAD to increase activity tolerance and occupational performance for ADL's within 10 days     Pt will demonstrate compliance of WBS during 100% of treatment sessions to increase safety and POC compliance during ADL's within 10 days     Pt will perform UB ADL's with Min A to increase occupational performance for ADL's within 10 days      Pt will demonstrate the ability to don/doff sling (S) and no more than 2 VC to  increase safety and WBS compliance during ADL's within 10 days      Pt will perform LB ADL's with Min A to increase occupational performance and increase independence for ADL's within 10 days       Rian Hernández

## 2024-02-02 NOTE — CASE MANAGEMENT
Case Management Assessment & Discharge Planning Note    Patient name Eulalio Raphael  Location W /W -01 MRN [id number]  : 1945 Date 2024       Current Admission Date: 2024  Current Admission Diagnosis:Closed left humeral fracture   Patient Active Problem List    Diagnosis Date Noted    Closed nondisplaced fracture of left clavicle 2024    Closed left humeral fracture 2024    Fall 2024    Nasal bone fracture 2024    Thyroid mass 2023    Papillary thyroid carcinoma (HCC) 2023    Substernal goiter 2023    History of breast cancer 2023    Recurrent depression (HCC) 2023    Essential hypertension 2023    Type 2 diabetes mellitus without complication, without long-term current use of insulin (HCC) 2023    Acquired hypothyroidism 2023    Generalized anxiety disorder 2023    Lymphedema of left arm 2023      LOS (days): 2  Geometric Mean LOS (GMLOS) (days): 2.9  Days to GMLOS:0.4     OBJECTIVE:    Risk of Unplanned Readmission Score: 13         Current admission status: Inpatient       Preferred Pharmacy:   [facility]/pharmacy #[id number] - [address] [address]  Phone: [phone] Fax: [phone]    Primary Care Provider: Lovely Kim DO    Primary Insurance: "[employer]  Secondary Insurance:     ASSESSMENT:  Active Health Care Proxies       Ro Coulter Health Care Agent - Daughter   Primary Phone: [phone] (Mobile)                           Readmission Root Cause  30 Day Readmission: No    Patient Information  Admitted from:: Home  Mental Status: Alert  During Assessment patient was accompanied by: Daughter  Assessment information provided by:: Daughter  Primary Caregiver: Self  Support Systems: Daughter, Family members  County of Residence: [place]  What city do you live in?: [place]  Home entry access options. Select all that apply.:  Stairs  Number of steps to enter home.: 3  Do the steps have railings?: Yes  Type of Current Residence: 2 story home  Upon entering residence, is there a bedroom on the main floor (no further steps)?: Yes  Upon entering residence, is there a bathroom on the main floor (no further steps)?: Yes  Living Arrangements: Lives w/ Daughter    Activities of Daily Living Prior to Admission  Functional Status: Independent  Completes ADLs independently?: Yes (needs assist with IADLs)  Ambulates independently?: Yes  Does patient use assisted devices?: No  Does patient currently own DME?: No  Does patient have a history of Outpatient Therapy (PT/OT)?: Yes  Does the patient have a history of Short-Term Rehab?: No  Does patient have a history of HHC?: No  Does patient currently have HHC?: No         Patient Information Continued  Income Source: Pension/half-way  Does patient have prescription coverage?: Yes  Does patient receive dialysis treatments?: No  Does patient have a history of substance abuse?: No  Does patient have a history of Mental Health Diagnosis?: No         Means of Transportation  Means of Transport to Appts:: Family transport      Housing Stability: Unknown (2/2/2024)    Housing Stability Vital Sign     Unable to Pay for Housing in the Last Year: No     Number of Places Lived in the Last Year: Not on file     Unstable Housing in the Last Year: No   Food Insecurity: No Food Insecurity (2/2/2024)    Hunger Vital Sign     Worried About Running Out of Food in the Last Year: Never true     Ran Out of Food in the Last Year: Never true   Transportation Needs: No Transportation Needs (2/2/2024)    PRAPARE - Transportation     Lack of Transportation (Medical): No     Lack of Transportation (Non-Medical): No   Utilities: Not At Risk (2/2/2024)    [facility] Utilities     Threatened with loss of utilities: No     CM reviewed the availability of treatment team to discuss questions or concerns patient and/or family may have  regarding  understanding medications and recognizing signs and symptoms at discharge.  CM also encouraged patient to follow up with all recommended appointments after discharge. CM reviewed the information that will be provided to pt/family on the discharge instructions.  Patient advised of importance for patient and family to participate in managing patient's medical well being.    DISCHARGE DETAILS:    Discharge planning discussed with:: pt and daughter  Freedom of Choice: Yes  Comments - Freedom of Choice: CM reviewed with pt and daughter the recommendations of rehab vs HHC by the care team.  Both daughter and pt would like for pt to return home.  Pt lives with her daughter who is able to assist her.  CM contacted family/caregiver?: Yes  Were Treatment Team discharge recommendations reviewed with patient/caregiver?: Yes  Did patient/caregiver verbalize understanding of patient care needs?: Yes  Were patient/caregiver advised of the risks associated with not following Treatment Team discharge recommendations?: Yes    Contacts  Patient Contacts: malaika Starr  Relationship to Patient:: Family  Contact Method: In Person  Reason/Outcome: Continuity of Care, Discharge Planning, Referral    Requested Home Health Care         Is the patient interested in HHC at discharge?: Yes  Home Health Discipline requested:: Occupational Therapy, Physical Therapy  Home Health Agency Name:: Other  HHA External Referral Reason (only applicable if external HHA name selected): Services not provided in network or near patient location  Home Health Follow-Up Provider:: PCP  Home Health Services Needed:: Other (comment), Strengthening/Theraputic Exercises to Improve Function, Gait/ADL Training, Evaluate Functional Status and Safety  Homebound Criteria Met:: Uses an Assist Device (i.e. cane, walker, etc)  Supporting Clincal Findings:: Limited Endurance    [facility] is able to accept pt and will see her for PT OT.    DME Referral  Provided  Referral made for DME?: Yes  DME referral completed for the following items:: Bedside Commode, Quad Cane  DME Supplier Name:: [employer]    Other Referral/Resources/Interventions Provided:  Interventions: HHC, DME  Referral Comments: Referral has been made to [facility] agencies in [place] where pt lives with her daughter.  CM awaiting responses at this time.  Pt is in need of a small quad cane and commode.  This has been ordered via [place].  [facility] to deliver.       Treatment Team Recommendation: Home with Home Health Care, Short Term Rehab  Discharge Destination Plan:: Home with Home Health Care

## 2024-02-02 NOTE — PROGRESS NOTES
Progress Note - Geriatric Medicine   Eulalio Raphael 78 y.o. female MRN: [id number]  Unit/Bed#: W -01 Encounter: [id number]      Assessment/Plan:  Closed left humeral fracture  S/p fall  CT imaging showed comminuted fracture through the surgical neck of the humerus.  The humeral head articular surface, greater tuberosity, lesser tuberosity and shaft are all separate fracture fragments.  The greater and lesser tuberosities are not distinctly appreciated given the high degree of comminution. there is significant angulation between the humeral shaft remaining fracture components.  Orthopedics was consulted  Patient underwent surgical fixation of left proximal humerus fracture with reverse total shoulder arthroplasty  NWB to LUE  Patient will receive 24 hours postop antibiotics for infection prophylaxis  Patient to remain in sling to LUE for 2 weeks  Multimodal pain regimen including geriatric pain protocol  Outpatient follow-up with orthopedics    Closed nondisplaced fracture of left clavicle  S/p fall  CT chest showed left proximal clavicular fracture  Ortho was consulted and following  NWB to LLE  Multimodal pain regimen including geriatric pain protocol  PT/OT following  Outpatient follow-up with orthopedics    Nasal bone fracture  CT shows suspected nondisplaced left nasal bone fracture with overlying soft tissue swelling  OMFS was consulted  No indication for surgical repair at this time  Diet as tolerated with good oral hygiene  Ice to face as needed as needed  Follow-up outpatient with OMFS office    Anxiety/depression  Patient reports chronic anxiety managed on Paxil 20 mg daily  Anxiety had been well-controlled at home  Continue to provide emotional support    Delirium precautions  Patient is alert oriented x4  Recommend checking vitamin B12, TSH, vitamin D levels  At risk for delirium due to hospitalization, medications, sleep disturbance  Recommend delirium precautions  Maintain sleep-wake cycle, avoid  nighttime interruptions  minimize overnight interruptions, group overnight vitals/labs/nursing checks as possible  dim lights, close blinds and turn off tv to minimize stimulation and encourage sleep environment in evenings  Provide adequate pain control  Avoid urinary retention and constipation  Provide frequent and early mobilization  Provide frequent redirection and reorientation as needed  Avoid medications that may worsen or precipitate delirium such as tramadol, benzodiazepines, anticholinergics, and Benadryl  Redirect unwanted behaviors as first-line therapy, avoid physical restraints as able to  Continue to monitor    Constipation  Patient complains of constipation  Last BM was prior to hospitalization  Is currently on Senokot 2 tablets daily and MiraLAX daily  Encourage adequate p.o. Hydration  Encourage prune juice as tolerated    Insomnia  First-line treatment is behavior modification  Maintain sleep-wake cycle, avoid nighttime interruptions  Avoid caffeine throughout the day  Avoid napping throughout the day  Encourage physical activity throughout the day  Avoid sedative hypnotics including benzodiazepines and benadryl  Patient reports she had trouble sleeping overnight  Continue melatonin 3 mg nightly-first dose to be given tonight    Delirium precautions  Patient is alert oriented x4  Concerned about some questionable delirium overnight, low-has been calm and cooperative today  At risk for delirium due to hospitalizations, medications, acute pain, sleep disturbance  Recommend delirium precautions  Maintain sleep-wake cycle, avoid nighttime interruptions  minimize overnight interruptions, group overnight vitals/labs/nursing checks as possible  dim lights, close blinds and turn off tv to minimize stimulation and encourage sleep environment in evenings  Provide adequate pain control  Avoid urinary retention and constipation  Provide frequent and early mobilization  Provide frequent redirection and  reorientation as needed  Avoid medications that may worsen or precipitate delirium such as tramadol, benzodiazepines, anticholinergics, and Benadryl  Redirect unwanted behaviors as first-line therapy, avoid physical restraints as able to  Continue to monitor      Ambulatory dysfunction  At a baseline ambulates with out assistive device  PT/OT following  Fall precautions  Out of bed as tolerated  Encourage early and frequent mobilization  Encourage adequate hydration and nutrition  Provide adequate pain management   Goal is undetermined at this time  Continue with PT/OT for continued strength and balance training     Subjective:   Eulalio is a 78-year-old female being seen for a geriatrics follow-up.  Upon examination patient was out of bed in the chair, resting.  She appeared comfortable and was in no acute distress.  She reports pain at a 5 out of 10 on exam.  Appetite is stable.  She did not sleep well overnight.  She reports that she moved her bowels this morning.  Per nursing no acute concerns or issues at this time.    Review of Systems   Constitutional:  Negative for activity change, appetite change, chills, fatigue and fever.   HENT:  Negative for sore throat and trouble swallowing.    Respiratory:  Negative for cough and shortness of breath.    Cardiovascular:  Negative for chest pain and palpitations.   Gastrointestinal:  Positive for constipation. Negative for abdominal pain, diarrhea, nausea and vomiting.   Genitourinary:  Negative for difficulty urinating, dysuria and hematuria.   Musculoskeletal:  Positive for arthralgias and gait problem. Negative for back pain.   Skin:  Negative for color change and rash.   Neurological:  Positive for weakness. Negative for dizziness, light-headedness and headaches.   Psychiatric/Behavioral:  Positive for sleep disturbance. Negative for confusion and dysphoric mood. The patient is nervous/anxious.          Objective:     Vitals: Blood pressure 136/54, pulse 74,  "temperature (!) 96.2 °F (35.7 °C), resp. rate 18, height 5' 5\" (1.651 m), SpO2 99%.,Body mass index is 22.9 kg/m².    No intake or output data in the 24 hours ending 02/02/24 1330      Current Medications: Reviewed    Physical Exam:   Physical Exam  Vitals reviewed.   Constitutional:       General: She is sleeping. She is not in acute distress.     Appearance: She is well-developed. She is not ill-appearing.   HENT:      Head: Normocephalic.   Cardiovascular:      Rate and Rhythm: Normal rate and regular rhythm.      Heart sounds: No murmur heard.  Pulmonary:      Effort: Pulmonary effort is normal. No respiratory distress.      Breath sounds: Normal breath sounds.   Abdominal:      General: Bowel sounds are normal. There is no distension.      Palpations: Abdomen is soft.      Tenderness: There is no abdominal tenderness.   Musculoskeletal:         General: Tenderness and signs of injury present. No swelling.      Right lower leg: No edema.      Left lower leg: No edema.      Comments: Sling left arm   Skin:     General: Skin is warm and dry.      Findings: Bruising present.   Neurological:      General: No focal deficit present.      Mental Status: She is oriented to person, place, and time and easily aroused. Mental status is at baseline.      Cranial Nerves: No cranial nerve deficit.      Motor: Weakness present.      Gait: Gait normal.      Comments: Forgetful at times   Psychiatric:         Mood and Affect: Mood normal.          Invasive Devices       Peripheral Intravenous Line  Duration             Peripheral IV 01/30/24 Right Antecubital 3 days    Peripheral IV 02/01/24 Right Hand 1 day                    Lab, Imaging and other studies:    Lab Results:   I have personally reviewed pertinent lab results including the following:  -CBC, BMP    I have personally reviewed the following imaging study reports in PACS:    - I have personally reviewed pertinent reports.      Please note:  Voice-recognition software " "may have been used in the preparation of this document.  Occasional wrong word or \"sound-alike\" substitutions may have occurred due to the inherent limitations of voice recognition software.  Interpretation should be guided by context.    "

## 2024-02-02 NOTE — PLAN OF CARE
Problem: OCCUPATIONAL THERAPY ADULT  Goal: Performs self-care activities at highest level of function for planned discharge setting.  See evaluation for individualized goals.  Description: Treatment Interventions: ADL retraining, Activityengagement, Compensatory technique education, Equipment evaluation/education, Patient/family training, Endurance training, Functional transfer training  Equipment Recommended: Bedside commode       See flowsheet documentation for full assessment, interventions and recommendations.   Note: Limitation: Decreased ADL status, Non-func L UE, Decreased endurance, Decreased Safe judgement during ADL, Decreased UE strength (balance, activity tolerance)  Prognosis: Good  Assessment: Pt is 78 year ol female seen by Occupational therapy at [facility] following admission on 01/30/2024 d/t fall. Dx: closed left humeral fracture, clavicle fracture, nasal fracture. Pt underwent reverse total shoulder arthroplasty 2/1/2024. Pt participated in an OT evaluation on this date 02/02/2024. Please see above for comprehensive list of PMHx impacting functional performance. Upon evaluations pt is performing below baseline.Pt presents with the following deficits decreased balance, decreased endurance, decreased strength, decreased activity tolerance.Pt occupational performance is supported by supportive family, PLOF, attitude towards recovery. Barriers to discharge include (+) falls and WBS. Pt would benefit from skilled occupational therapy to address: ADL retraining, activity engagement, balance, and education for compensatory dressing to maximize occupational performance and increase independence. At time of discharge pt is recommended level ll moderate resource intensity.     Rehab Resource Intensity Level, OT: II (Moderate Resource Intensity)     Rian Hernández

## 2024-02-02 NOTE — PROGRESS NOTES
[facility]  Progress Note  Name: Eulalio Raphael I  MRN: [id number]  Unit/Bed#: W -01 I Date of Admission: 1/30/2024   Date of Service: 2/2/2024 I Hospital Day: 2    Assessment/Plan   Fall  Assessment & Plan  Mechanical fall   Injuries listed below  PT/OT    Closed nondisplaced fracture of left clavicle  Assessment & Plan  Secondary to fall  CT chest showed left proximal clavicular fracture    Ortho consulted and note appreciated  NWB LLE  Multimodal pain regimen  PT/OT    * Closed left humeral fracture  Assessment & Plan  Secondary to fall  CT imaging showed: Comminuted fracture through the surgical neck of the humerus. The humeral head articular surface, greater tuberosity, lesser tuberosity and shaft are all separate fracture fragments. The greater and lesser tuberosities are not distinctly appreciated given the high degree of comminution. There is significant angulation between the humeral shaft and remaining fracture components  Ortho consulted and note appreciated.   2/1 surgical fixation of left proximal humerus fracture with a reverse total shoulder arthroplasty   NWB LLE--arm sling  Multimodal pain regimen initiated.   Follow up with Ortho as an OP    Nasal bone fracture  Assessment & Plan  Secondary to fall  CT imaging showed: Suspected nondisplaced left nasal bone fracture with overlying soft tissue swelling.   OMFS consulted and note appreciated. Non operative management  Analgesia as needed. Ice to face for the first 24 hours.  Nasal precautions  FU with OMFS as an OP    Recurrent depression (HCC)  Assessment & Plan  Continue home paxil    Acquired hypothyroidism  Assessment & Plan  Continue home levothyroxine    Type 2 diabetes mellitus without complication, without long-term current use of insulin (HCC)  Assessment & Plan  Lab Results   Component Value Date    HGBA1C 6.2 (H) 01/27/2023       Recent Labs     02/01/24  1248 02/01/24  1522 02/01/24  2146 02/02/24  0806  "  POCGLU 183* 205* 245* 168*         Blood Sugar Average: Last 72 hrs:  (P) 169    Hold home metformin. SSI while IP      Essential hypertension  Assessment & Plan  Continue home amlodipine              Bowel Regimen: Senna  VTE Prophylaxis:Sequential compression device (Venodyne)  and Enoxaparin (Lovenox)     Disposition: Pending PT/OT eval    Subjective   Chief Complaint: \"I am sore\"    Subjective: Patient describes having soreness in her left shoulder throughout the evening.  Daughter notes that she was not able to get much sleep throughout the night.  There is also possible concern for visual hallucinations associated with light reflecting off the TV.  We discussed delirium associated with anesthesia and hospital stay.  We discussed sleep hygiene and pain control.  Patient and daughter deny any other complaints.  Overall they note that she is doing well this morning.  She confirms that she tolerated her breakfast without any difficulty.  Awaiting physical therapy evaluation.     Objective   Vitals:   Temp:  [96.2 °F (35.7 °C)-97.7 °F (36.5 °C)] 96.2 °F (35.7 °C)  HR:  [37-82] 57  Resp:  [18] 18  BP: (119-187)/() 121/51    I/O         01/31 0701  02/01 0700 02/01 0701  02/02 0700 02/02 0701  02/03 0700    I.V.  1700     IV Piggyback  150     Total Intake  1850     Urine 200 300     Blood  250     Total Output 200 550     Net -200 +1300                     Physical Exam:   GENERAL APPEARANCE: No acute distress.  NEURO: GCS is 15.  Light touch sensation intact in all extremities.  HEENT: Normocephalic, atraumatic.  Neck supple.  CV: Regular rate and rhythm.  +2 radial and dorsalis pedis pulses, bilaterally.  LUNGS: Clear to auscultation, bilaterally.  Chest wall is nontender on palpation.  GI: Abdomen is soft nontender.  : Pelvis stable.  MSK: Left upper extremity is swollen in an arm sling.  Patient is able to fully range all of her fingers her left upper extremity.  All other extremities display no " "deformities.  SKIN: Warm, dry.  Left shoulder surgical dressing is clean, dry, intact.    Invasive Devices       Peripheral Intravenous Line  Duration             Peripheral IV 01/30/24 Right Antecubital 3 days    Peripheral IV 02/01/24 Right Hand 1 day                          Lab Results: Results: I have personally reviewed all pertinent laboratory/tests results, BMP/CMP:   Lab Results   Component Value Date    SODIUM 134 (L) 02/02/2024    K 4.2 02/02/2024     02/02/2024    CO2 26 02/02/2024    BUN 21 02/02/2024    CREATININE 0.99 02/02/2024    CALCIUM 9.1 02/02/2024    EGFR 55 02/02/2024   , CBC:   Lab Results   Component Value Date    WBC 11.40 (H) 02/02/2024    HGB 7.9 (L) 02/02/2024    HCT 24.1 (L) 02/02/2024    MCV 85 02/02/2024     02/02/2024    RBC 2.84 (L) 02/02/2024    MCH 27.8 02/02/2024    MCHC 32.8 02/02/2024    RDW 14.7 02/02/2024    MPV 10.0 02/02/2024    NRBC 0 02/02/2024   , and Coagulation: No results found for: \"PT\", \"INR\", \"APTT\"  Imaging: I have personally reviewed pertinent reports.     Other Studies: none       "

## 2024-02-02 NOTE — PLAN OF CARE
Problem: PHYSICAL THERAPY ADULT  Goal: Performs mobility at highest level of function for planned discharge setting.  See evaluation for individualized goals.  Description: Treatment/Interventions: Functional transfer training, LE strengthening/ROM, Therapeutic exercise, Endurance training, Gait training, Bed mobility, Equipment eval/education, Patient/family training, Elevations          See flowsheet documentation for full assessment, interventions and recommendations.  2/2/2024 1130 by Esvin Mckee PT  Note:    Problem List: Decreased strength, Decreased range of motion, Decreased endurance, Impaired balance, Decreased mobility, Decreased safety awareness, Pain, Orthopedic restrictions (L UE edema)  Assessment: Pt presents via EMS from home after reported fall down 15 steps at home. Dx: left humeral fx, left clavicle fx, nasal bone fx, DM, and essential HTN. 2/1/24 urgical fixation of left proximal humerus fracture with a reverse total shoulder arthroplasty. order placed for PT eval and tx, w/ activity order of NWB L UE. pt presents w/ comorbidities of breast cancer, HTN, DM, and spine surgery and personal factors of advanced age, living in multi story house, stair(s) to enter home, positive fall history, anxiety, and depression. pt presents w/ weakness, decreased ROM, decreased endurance, decreased safety awareness, orthopedic restrictions, fall risk, and L UE edema. these impairments are evident in findings from physical examination (weakness, decreased ROM, and edema of extremities), mobility assessment (need for min to mod assist w/ all phases of mobility when usually mobilizing independently, tolerance to only 15 feet of ambulation, and need for cueing for mobility technique), and Barthel Index: 45/100. pt needed input for task focus and mobility technique/safety. pt is at risk for falls due to physical and safety awareness deficits. pt's clinical presentation is unstable/unpredictable (evident in  need for assist w/ all phases of mobility when usually mobilizing independently, tolerance to only 15 feet of ambulation, and need for input for mobility technique/safety). pt needs inpatient PT tx to improve mobility deficits and progress mobility training as appropriate.        Rehab Resource Intensity Level, PT: II (Moderate Resource Intensity)    See flowsheet documentation for full assessment.     2/2/2024 1129 by Esvin Mckee PT  Note:    Problem List: Decreased strength, Decreased range of motion, Decreased endurance, Impaired balance, Decreased mobility, Decreased safety awareness, Pain, Orthopedic restrictions (L UE edema)  Assessment: Pt presents via EMS from home after reported fall down 15 steps at home. Dx: left humeral fx, left clavicle fx, nasal bone fx, DM, and essential HTN. 2/1/24 urgical fixation of left proximal humerus fracture with a reverse total shoulder arthroplasty. order placed for PT eval and tx, w/ activity order of NWB L UE. pt presents w/ comorbidities of breast cancer, HTN, DM, and spine surgery and personal factors of advanced age, living in multi story house, stair(s) to enter home, positive fall history, anxiety, and depression. pt presents w/ weakness, decreased ROM, decreased endurance, decreased safety awareness, orthopedic restrictions, fall risk, and L UE edema. these impairments are evident in findings from physical examination (weakness, decreased ROM, and edema of extremities), mobility assessment (need for min to mod assist w/ all phases of mobility when usually mobilizing independently, tolerance to only 15 feet of ambulation, and need for cueing for mobility technique), and Barthel Index: 45/100. pt needed input for task focus and mobility technique/safety. pt is at risk for falls due to physical and safety awareness deficits. pt's clinical presentation is unstable/unpredictable (evident in need for assist w/ all phases of mobility when usually mobilizing  independently, tolerance to only 15 feet of ambulation, and need for input for mobility technique/safety). pt needs inpatient PT tx to improve mobility deficits and progress mobility training as appropriate.        Rehab Resource Intensity Level, PT: II (Moderate Resource Intensity)    See flowsheet documentation for full assessment.

## 2024-02-02 NOTE — CONSULTS
Please see progress note from today by this provider for formal details of encounter.    OMRE Hammond  Acute Pain Service

## 2024-02-02 NOTE — PHYSICAL THERAPY NOTE
PHYSICAL THERAPY EVALUATION NOTE    Patient Name: Eulalio Raphael  Today's Date: 2/2/2024  AGE:   78 y.o.  Mrn:   [id number]  ADMIT DX:  Generalized anxiety disorder [F41.1]  Acquired hypothyroidism [E03.9]  Recurrent depression (HCC) [F33.9]  Closed fracture of nasal bone, initial encounter [S02.2XXA]  Closed displaced fracture of surgical neck of left humerus, unspecified fracture morphology, initial encounter [S42.212A]  Fracture of unspecified part of left clavicle, initial encounter for closed fracture [S42.002A]  Type 2 diabetes mellitus without complication, without long-term current use of insulin (HCC) [E11.9]  Lymphedema of left arm [I89.0]    Past Medical History:   Diagnosis Date    Breast cancer (HCC)     Hypertension      Length Of Stay: 2  PHYSICAL THERAPY EVALUATION :    02/02/24 0946   PT Last Visit   PT Visit Date 02/02/24   Pain Assessment   Pain Assessment Tool 0-10   Pain Score No Pain   Restrictions/Precautions   LUE Weight Bearing Per Order NWB   Braces or Orthoses Other (Comment)  (left shoulderabduction sling)   Other Precautions Chair Alarm;Bed Alarm;WBS;Fall Risk  ([place])   Home Living   Type of Home House   Home Layout Multi-level;Able to live on main level with bedroom/bathroom;Other (Comment)  (3 ARCHANA w/ bilateral railings. shower in basement. steps vs elevator available to access basement.)   Additional Comments lvies w/ daughther ambulates w/o device. no DME. independnet w/ ADLs. receives assistance w/ IADLs. 2 falls in last 6 months.   General   Additional Pertinent History room air resting pulse ox 100% and 63 BPM, active 94% and 81 BPM.   Family/Caregiver Present Yes   Cognition   Arousal/Participation Alert   Orientation Level Oriented to person;Other (Comment)  (pt was identified w/ full name, ID bracelet)   Following Commands Follows one step commands without difficulty   Comments moderate edema L  UE related to history of lymphedema.   Subjective   Subjective pt seen sitting on edge of bed w/ daughter and OT present. pt agreed to participate in PT eval. denied pain or dizziness. occasional input was needed for task focus.   RUE Assessment   RUE Assessment WFL   LUE Assessment   LUE Assessment X  (not tested)   RLE Assessment   RLE Assessment WFL  (3+ to 4-/5)   LLE Assessment   LLE Assessment WFL  (3+ to 4-/5)   Vision-Basic Assessment   Current Vision Wears glasses only for reading   Light Touch   RLE Light Touch Grossly intact   LLE Light Touch Grossly intact   Transfers   Sit to Stand 4  Minimal assistance   Additional items Assist x 1;Increased time required;Verbal cues  (for LE positioning)   Stand to Sit 4  Minimal assistance   Additional items Assist x 1;Increased time required;Verbal cues  (for body positioning)   Ambulation/Elevation   Gait pattern Foward flexed;Short stride;Excessively slow   Gait Assistance 4  Minimal assist   Additional items Assist x 1;Verbal cues;Tactile cues  (for posture, full step length)   Assistive Device Other (Comment)  (hand hold assist)   Distance 15 feet  (additional not possible due to fatigue)   Stair Management Assistance 4  Minimal assist  (to modx1)   Additional items Assist x 1;Verbal cues;Tactile cues;Increased time required  (for railing use, foot clearance, safety)   Stair Management Technique One rail R;Step to pattern   Number of Stairs 3   Balance   Static Sitting Fair +   Static Standing Poor +   Ambulatory Poor +   Activity Tolerance   Activity Tolerance Patient limited by fatigue   Nurse Made Aware spoke to Zee GRULLON, Bing OT, Rian HUFF, Yana CM   Assessment   Problem List Decreased strength;Decreased range of motion;Decreased endurance;Impaired balance;Decreased mobility;Decreased safety awareness;Pain;Orthopedic restrictions  (L UE edema)   Assessment Pt presents via EMS from home after reported fall down 15 steps at home. Dx: left humeral fx,  left clavicle fx, nasal bone fx, DM, and essential HTN. 2/1/24 urgical fixation of left proximal humerus fracture with a reverse total shoulder arthroplasty. order placed for PT eval and tx, w/ activity order of NWB L UE. pt presents w/ comorbidities of breast cancer, HTN, DM, and spine surgery and personal factors of advanced age, living in multi story house, stair(s) to enter home, positive fall history, anxiety, and depression. pt presents w/ weakness, decreased ROM, decreased endurance, decreased safety awareness, orthopedic restrictions, fall risk, and L UE edema. these impairments are evident in findings from physical examination (weakness, decreased ROM, and edema of extremities), mobility assessment (need for min to mod assist w/ all phases of mobility when usually mobilizing independently, tolerance to only 15 feet of ambulation, and need for cueing for mobility technique), and Barthel Index: 45/100. pt needed input for task focus and mobility technique/safety. pt is at risk for falls due to physical and safety awareness deficits. pt's clinical presentation is unstable/unpredictable (evident in need for assist w/ all phases of mobility when usually mobilizing independently, tolerance to only 15 feet of ambulation, and need for input for mobility technique/safety). pt needs inpatient PT tx to improve mobility deficits and progress mobility training as appropriate.   Goals   Patient Goals go home   STG Expiration Date 02/12/24   Short Term Goal #1 pt will: Increase bilateral LE strength 1/2 grade to facilitate independent mobility, Perform bed mobility modified independent to increase level of independence, Perform all transfers modified independent to improve independence, Ambulate 200 ft. with least restrictive assistive device w/ supervision w/o LOB to improve functional independence, Navigate 10 stair(s) w/ supervision with unilateral handrail to facilitate return to previous living environment, Increase  ambulatory balance 1 grade to decrease risk for falls, Complete exercise program independently to increase strength and endurance, Tolerate 3 hr OOB to faciliate upright tolerance, Improve Barthel Index score to 65 or greater to facilitate independence, Recall and adhere to NWB of L UE to facilitate surgical healing, and Complete Timed Up and Go or Comfortable Gait Speed to further assess mobility and monitor progress   PT Treatment Day 1   Plan   Treatment/Interventions Functional transfer training;LE strengthening/ROM;Therapeutic exercise;Endurance training;Gait training;Bed mobility;Equipment eval/education;Patient/family training;Elevations   PT Frequency 3-5x/wk   Discharge Recommendation   Rehab Resource Intensity Level, PT II (Moderate Resource Intensity)   Additional Comments (S)  recommend use of small base quad cane for mobility   AM-PAC Basic Mobility Inpatient   Turning in Flat Bed Without Bedrails 3   Lying on Back to Sitting on Edge of Flat Bed Without Bedrails 2   Moving Bed to Chair 3   Standing Up From Chair Using Arms 3   Walk in Room 3   Climb 3-5 Stairs With Railing 2   Basic Mobility Inpatient Raw Score 16   Basic Mobility Standardized Score 38.32   Highest Level Of Mobility   -[facility] Goal 5: Stand one or more mins   -HLM Achieved 7: Walk 25 feet or more   Barthel Index   Feeding 5   Bathing 0   Grooming Score 0   Dressing Score 0   Bladder Score 10   Bowels Score 10   Toilet Use Score 5   Transfers (Bed/Chair) Score 10   Mobility (Level Surface) Score 0   Stairs Score 5   Barthel Index Score 45   Additional Treatment Session   Start Time 0946   End Time 0956   Treatment Assessment Pt agreed to participate in PT intervention. Therapist introduced small base quad cane use w/ ambulation to improve gait stability.  Sit <---> stand transfer w/ minx1. ambulated 40 feet x2 w/ roller walker w/ minx1. Standing rest break was required. Additional ambulation was not possible due to fatigue. Pt was noted  to have improvement w/ use of cane w/ increased ambulation distance but needed same level of assist to maintain safety. continued inpatient PT tx is indicated to reduce fall risk.   Equipment Use small base quad cane   Additional Treatment Day 1   End of Consult   Patient Position at End of Consult Bedside chair;Bed/Chair alarm activated;All needs within reach     The patient's AM-PAC Basic Mobility Inpatient Short Form Raw Score is 16. A Raw score of less than or equal to 16 suggests the patient may benefit from discharge to post-acute rehabilitation services. Please also refer to the recommendation of the Physical Therapist for safe discharge planning.    Skilled PT recommended while in hospital and upon DC to progress pt toward treatment goals.     Esvin Mckee, PT

## 2024-02-02 NOTE — PROGRESS NOTES
Progress Note - Orthopedics   Eulalio Raphael 78 y.o. female MRN: [id number]  Unit/Bed#: W -01      Subjective:    78 y.o.female seen and evaluated at bedside. Pain currently well controlled. No significant concerns this morning. Daughter at bedside.     Labs:  0   Lab Value Date/Time    HCT 24.1 (L) 02/02/2024 0509    HCT 27.6 (L) 02/01/2024 1511    HCT 30.0 (L) 02/01/2024 0618    HGB 7.9 (L) 02/02/2024 0509    HGB 8.8 (L) 02/01/2024 1511    HGB 9.7 (L) 02/01/2024 0618    INR 0.96 01/31/2024 0645    WBC 11.40 (H) 02/02/2024 0509    WBC 10.32 (H) 02/01/2024 1511    WBC 7.71 02/01/2024 0618       Meds:    Current Facility-Administered Medications:     acetaminophen (TYLENOL) tablet 975 mg, 975 mg, Oral, Q8H MAGALI, OMER Torres, 975 mg at 02/02/24 0533    amLODIPine (NORVASC) tablet 10 mg, 10 mg, Oral, Daily, OMER Torres, 10 mg at 02/02/24 0837    enoxaparin (LOVENOX) subcutaneous injection 30 mg, 30 mg, Subcutaneous, Q12H, OMER Torres, 30 mg at 02/02/24 0019    HYDROmorphone HCl (DILAUDID) injection 0.2 mg, 0.2 mg, Intravenous, Q2H PRN, OMER oTrres, 0.2 mg at 01/31/24 1833    insulin lispro (HumaLOG) 100 units/mL subcutaneous injection 1-5 Units, 1-5 Units, Subcutaneous, 4x Daily, 1 Units at 02/02/24 0835 **AND** Fingerstick Glucose (POCT), , , 4x Daily, OMER Torres    levothyroxine tablet 25 mcg, 25 mcg, Oral, Early Morning, OMER Torres, 25 mcg at 02/02/24 0533    lidocaine (LIDODERM) 5 % patch 1 patch, 1 patch, Topical, Daily, OMER Torres, 1 patch at 02/02/24 0837    naloxone (NARCAN) 0.04 mg/mL syringe 0.04 mg, 0.04 mg, Intravenous, Q1MIN PRN, OMER Torres    ondansetron (ZOFRAN) injection 4 mg, 4 mg, Intravenous, Q8H PRN, OMER Torres    oxyCODONE (ROXICODONE) split tablet 2.5 mg, 2.5 mg, Oral, Q4H PRN **OR** oxyCODONE (ROXICODONE) IR tablet 5 mg, 5 mg, Oral, Q4H PRN, OMER Torres, 5 mg at 02/02/24 0020    PARoxetine (PAXIL) tablet 20  "mg, 20 mg, Oral, Daily, OMER Torres, 20 mg at 02/02/24 0837    polyethylene glycol (MIRALAX) packet 17 g, 17 g, Oral, Daily, OMER Torres, 17 g at 02/02/24 0837    senna (SENOKOT) tablet 17.2 mg, 2 tablet, Oral, Daily, OMER Torres, 17.2 mg at 02/02/24 0837    Blood Culture:   No results found for: \"BLOODCX\"    Wound Culture:   No results found for: \"WOUNDCULT\"    Ins and Outs:  I/O last 24 hours:  In: 1850 [I.V.:1700; IV Piggyback:150]  Out: 550 [Urine:300; Blood:250]          Physical:  Vitals:    02/02/24 0721   BP: 121/51   Pulse: 57   Resp:    Temp:    SpO2: 98%     Musculoskeletal: left Upper Extremity  Surgical dressings c/d/I without strike through.  Some moderate swelling in the left hand (patient with known lymphedema).   Sensation intact to median/radial/ulnar nerve distribution   Motor intact anterior interosseous nerve/posterior interosseous nerve/median/radial/ulnar nerve distributions  2+ radial pulse  Digits warm and well perfused  Capillary refill < 2 seconds    Assessment:    78 y.o.female s/p surgical fixation of left proximal humerus fracture with a reverse total shoulder arthroplasty with Dr. Kaufman 2/1/2024 (POD 1) .     Plan:  Non weight bearing to the left upper extremity.   Should remain in sling at all times for the first 2 weeks.   Ok for range of motion of wrist and hand and digital range of motion.   Will monitor for ABLA and administer IVF/prbc as indicated for Greater than 2 gram drop or Hgb < 7, defer to primary team.   Pain control per primary team.   DVT ppx per primary team  Medical management per primary team.   Dispo: Ortho will follow  Patient should follow up with Dr. Kaufman upon discharge.     Beth Lucas PA-C      "

## 2024-02-02 NOTE — PLAN OF CARE
Problem: PAIN - ADULT  Goal: Verbalizes/displays adequate comfort level or baseline comfort level  Description: Interventions:  - Encourage patient to monitor pain and request assistance  - Assess pain using appropriate pain scale  - Administer analgesics based on type and severity of pain and evaluate response  - Implement non-pharmacological measures as appropriate and evaluate response  - Consider cultural and social influences on pain and pain management  - Notify physician/advanced practitioner if interventions unsuccessful or patient reports new pain  Outcome: Progressing     Problem: INFECTION - ADULT  Goal: Absence or prevention of progression during hospitalization  Description: INTERVENTIONS:  - Assess and monitor for signs and symptoms of infection  - Monitor lab/diagnostic results  - Monitor all insertion sites, i.e. indwelling lines, tubes, and drains  - Monitor endotracheal if appropriate and nasal secretions for changes in amount and color  - [place] appropriate cooling/warming therapies per order  - Administer medications as ordered  - Instruct and encourage patient and family to use good hand hygiene technique  - Identify and instruct in appropriate isolation precautions for identified infection/condition  Outcome: Progressing  Goal: Absence of fever/infection during neutropenic period  Description: INTERVENTIONS:  - Monitor WBC    Outcome: Progressing     Problem: SAFETY ADULT  Goal: Patient will remain free of falls  Description: INTERVENTIONS:  - Educate patient/family on patient safety including physical limitations  - Instruct patient to call for assistance with activity   - Consult OT/PT to assist with strengthening/mobility   - Keep Call bell within reach  - Keep bed low and locked with side rails adjusted as appropriate  - Keep care items and personal belongings within reach  - Initiate and maintain comfort rounds  - Make Fall Risk Sign visible to staff  - Offer Toileting every  Hours,  in advance of need  - Initiate/Maintain alarm  - Obtain necessary fall risk management equipment:   - Apply yellow socks and bracelet for high fall risk patients  - Consider moving patient to room near nurses station  Outcome: Progressing  Goal: Maintain or return to baseline ADL function  Description: INTERVENTIONS:  -  Assess patient's ability to carry out ADLs; assess patient's baseline for ADL function and identify physical deficits which impact ability to perform ADLs (bathing, care of mouth/teeth, toileting, grooming, dressing, etc.)  - Assess/evaluate cause of self-care deficits   - Assess range of motion  - Assess patient's mobility; develop plan if impaired  - Assess patient's need for assistive devices and provide as appropriate  - Encourage maximum independence but intervene and supervise when necessary  - Involve family in performance of ADLs  - Assess for home care needs following discharge   - Consider OT consult to assist with ADL evaluation and planning for discharge  - Provide patient education as appropriate  Outcome: Progressing  Goal: Maintains/Returns to pre admission functional level  Description: INTERVENTIONS:  - Perform AM-PAC 6 Click Basic Mobility/ Daily Activity assessment daily.  - Set and communicate daily mobility goal to care team and patient/family/caregiver.   - Collaborate with rehabilitation services on mobility goals if consulted  - Perform Range of Motion 3 times a day.  - Reposition patient every 3 hours.  - Dangle patient 3 times a day  - Stand patient 3 times a day  - Ambulate patient 3 times a day  - Out of bed to chair 3 times a day   - Out of bed for meals 3 times a day  - Out of bed for toileting  - Record patient progress and toleration of activity level   Outcome: Progressing     Problem: DISCHARGE PLANNING  Goal: Discharge to home or other facility with appropriate resources  Description: INTERVENTIONS:  - Identify barriers to discharge w/patient and caregiver  -  Arrange for needed discharge resources and transportation as appropriate  - Identify discharge learning needs (meds, wound care, etc.)  - Arrange for interpretive services to assist at discharge as needed  - Refer to Case Management Department for coordinating discharge planning if the patient needs post-hospital services based on physician/advanced practitioner order or complex needs related to functional status, cognitive ability, or social support system  Outcome: Progressing

## 2024-02-02 NOTE — PLAN OF CARE
Problem: OCCUPATIONAL THERAPY ADULT  Goal: Performs self-care activities at highest level of function for planned discharge setting.  See evaluation for individualized goals.  Description: Treatment Interventions: ADL retraining, Activityengagement, Compensatory technique education, Equipment evaluation/education, Patient/family training, Endurance training, Functional transfer training  Equipment Recommended: Bedside commode       See flowsheet documentation for full assessment, interventions and recommendations.   Outcome: Progressing  Note: Limitation: Decreased ADL status, Non-func L UE, Decreased endurance, Decreased Safe judgement during ADL, Decreased UE strength (balance, activity tolerance)  Prognosis: Good  Assessment: Patient seen for OT treatment on 2/2/2024 s/p admission for Closed left humeral fracture Patient agreeable to OT session. Patient participated in hemidressing techniques , abduction sling mgmt education, patient/caregiver education and with intervention focus on maximizing functional independence during ADL tasks, patient safety. Pt's daughter (caregiver) demonstrates improved understanding of sling management and TSA precautions.  Personal factors continuing to impact D/C include: Assistance needed for ADLs and functional mobility, From OT standpoint, patient would benefit from skilled intervention to maximize independence with ADLs and functional mobility. Goals remain appropriate, continue POC. At this time, recommending D/C to: Level 2: moderate resource intensity     Rehab Resource Intensity Level, OT: II (Moderate Resource Intensity)       Bing Carpio OT

## 2024-02-02 NOTE — PROGRESS NOTES
Peripheral Nerve Block Follow-up Note - Acute Pain Service    Eulalio Raphael 78 y.o. female MRN: [id number]  Unit/Bed#: W -01 Encounter: [id number]      Assessment:   Principal Problem:    Closed left humeral fracture  Active Problems:    Essential hypertension    Type 2 diabetes mellitus without complication, without long-term current use of insulin (HCC)    Acquired hypothyroidism    Recurrent depression (HCC)    Closed nondisplaced fracture of left clavicle    Fall    Nasal bone fracture    Eulalio Raphael is a 78 y.o. year old female who presented following a fall and sustained a left proximal clavicle fracture, left proximal humerus fracture, nasal bone fracture.  She underwent surgical fixation of the left proximal humerus fracture with a reverse total shoulder arthroplasty for which she received a left-sided single shot interscalene block and PECS I/II with exparel for postoperative analgesia.  APS consulted for postoperative block follow-up.    Patient seen and examined at bedside this morning, resting and appears comfortable in bed.  Overall reporting adequate pain control postoperatively and reports some residual sensory deficits secondary to peripheral nerve blocks.  She did require 1 dose of oxycodone 5 mg overnight with good analgesic effect.  She denies any opioid-induced side effects at this time.    Plan:   Left-sided single shot interscalene and PECS I/II blocks with Exparel are functioning appropriately with expected sensory deficit.  Pain is currently well-controlled.    - Multimodal analgesia with:  Tylenol 975 mg every 8 hours scheduled  Lidocaine patch, 12 hours on/12 hours off, to affected area  Oxycodone 2.5 mg every 4 hours as needed for moderate pain  Oxycodone 5 mg every 4 hours as needed for severe pain  IV hydromorphone every 2 hours as needed for breakthrough pain  Consider discontinuation of intravenous opioids over next 24 hours if pain remains well-controlled  IV Narcan as  needed for respiratory depression/opioid reversal    APS will sign off at this time. Thank you for the consult. All opioids and other analgesics to be written at discretion of primary team. Please contact Acute Pain Service - [facility] via [employer] from [phone] with additional questions or concerns. See [employer] or [employer] for additional contacts and after hours information.    Pain History  Current pain location(s): Left upper extremity  Pain Scale:   0  Quality: Aching  24 hour history: No acute events overnight, remains hemodynamically stable.  Pain remains well-controlled and she is required a single dose of oxycodone 5 mg overnight with good analgesic effect.  She currently denies shortness of breath, nausea/vomiting.    Opioid requirement previous 24 hours:   5 mg oxycodone    Meds/Allergies   all current active meds have been reviewed, current meds:   Current Facility-Administered Medications   Medication Dose Route Frequency    acetaminophen (TYLENOL) tablet 975 mg  975 mg Oral Q8H MAGALI    amLODIPine (NORVASC) tablet 10 mg  10 mg Oral Daily    enoxaparin (LOVENOX) subcutaneous injection 30 mg  30 mg Subcutaneous Q12H    HYDROmorphone HCl (DILAUDID) injection 0.2 mg  0.2 mg Intravenous Q2H PRN    insulin lispro (HumaLOG) 100 units/mL subcutaneous injection 1-5 Units  1-5 Units Subcutaneous 4x Daily    levothyroxine tablet 25 mcg  25 mcg Oral Early Morning    lidocaine (LIDODERM) 5 % patch 1 patch  1 patch Topical Daily    naloxone (NARCAN) 0.04 mg/mL syringe 0.04 mg  0.04 mg Intravenous Q1MIN PRN    ondansetron (ZOFRAN) injection 4 mg  4 mg Intravenous Q8H PRN    oxyCODONE (ROXICODONE) split tablet 2.5 mg  2.5 mg Oral Q4H PRN    Or    oxyCODONE (ROXICODONE) IR tablet 5 mg  5 mg Oral Q4H PRN    PARoxetine (PAXIL) tablet 20 mg  20 mg Oral Daily    polyethylene glycol (MIRALAX) packet 17 g  17 g Oral Daily    senna (SENOKOT) tablet 17.2 mg  2 tablet Oral Daily   , and PTA meds:   Prior to Admission Medications    Prescriptions Last Dose Informant Patient Reported? Taking?   Multiple Vitamin (multivitamin) capsule  Self Yes No   Sig: Take 1 capsule by mouth daily   PARoxetine (PAXIL) 20 mg tablet  Self No No   Sig: Take 1 tablet (20 mg total) by mouth daily   amLODIPine (NORVASC) 10 mg tablet   No No   Sig: TAKE 1 TABLET BY MOUTH EVERY DAY   levothyroxine 25 mcg tablet   No No   Sig: TAKE 1 TABLET BY MOUTH EVERY DAY   metFORMIN (GLUCOPHAGE-XR) 500 mg 24 hr tablet   No No   Sig: Take 2 tablets (1,000 mg total) by mouth 2 (two) times a day with meals      Facility-Administered Medications: None       Allergies   Allergen Reactions    Ace Inhibitors Angioedema    Erythromycin Swelling       Objective     Temp:  [96.2 °F (35.7 °C)-97.7 °F (36.5 °C)] 96.2 °F (35.7 °C)  HR:  [37-82] 57  Resp:  [18] 18  BP: (119-187)/() 121/51    Physical Exam  Constitutional:       General: She is not in acute distress.     Appearance: She is not ill-appearing or toxic-appearing.   HENT:      Head: Normocephalic and atraumatic.   Cardiovascular:      Rate and Rhythm: Normal rate.   Pulmonary:      Effort: Pulmonary effort is normal.   Chest:      Chest wall: No tenderness.   Abdominal:      General: Abdomen is flat. There is no distension.      Palpations: Abdomen is soft.      Tenderness: There is no abdominal tenderness.   Musculoskeletal:         General: Swelling (B/L UE) and tenderness (L shoulder) present. No deformity.   Skin:     General: Skin is warm and dry.      Coloration: Skin is not pale.      Findings: No rash.   Neurological:      Mental Status: She is alert and oriented to person, place, and time. Mental status is at baseline.      Sensory: Sensory deficit (LUE) present.      Motor: Weakness (LUE) present.   Psychiatric:         Mood and Affect: Mood normal.         Behavior: Behavior normal.           Lab Results:   Results from last 7 days   Lab Units 02/02/24  0509   WBC Thousand/uL 11.40*   HEMOGLOBIN g/dL 7.9*    HEMATOCRIT % 24.1*   PLATELETS Thousands/uL 149      Results from last 7 days   Lab Units 02/02/24  0509 01/31/24  0645 01/30/24  1930   POTASSIUM mmol/L 4.2   < > 3.8   CHLORIDE mmol/L 103   < > 103   CO2 mmol/L 26   < > 25   BUN mg/dL 21   < > 15   CREATININE mg/dL 0.99   < > 0.90   CALCIUM mg/dL 9.1   < > 10.1   ALK PHOS U/L  --   --  91   ALT U/L  --   --  22   AST U/L  --   --  27    < > = values in this interval not displayed.       Imaging Studies: I have personally reviewed pertinent reports.        Counseling / Coordination of Care  Total floor / unit time spent today 15 minutes. Greater than 50% of total time was spent with the patient and / or family counseling and / or coordination of care. A description of the counseling / coordination of care: Patient interview, physical examination, review of PDMP, review of medical record, review of imaging and laboratory data, development of pain management plan, discussion of pain management plan with patient and primary service.      Please note that the APS provides consultative services regarding pain management only.  With the exception of ketamine, peripheral nerve catheters, and epidural infusions (and except when indicated), final decisions regarding starting or changing doses of analgesic medications are at the discretion of the consulting service.  Off hours consultation and/or medication management is generally not available.    OMER White  Acute Pain Service

## 2024-02-02 NOTE — INCIDENTAL FINDINGS
The following findings require follow up:  Radiographic finding   Finding:      Stable appearance of massively enlarged thyroid gland, with numerous nodules, and large exophytic nodule extending into the right hemithorax. When compared to the prior study there is interval decrease in the extent of rightward tracheal deviation status post left hemithyroidectomy.  Multiple calcified thyroid nodules in the right thyroid lobe, the largest measuring approximately 1.6 cm x 1.5 x 1.8 cm. A pedunculated partially calcified nodule is also suspected along the inferior margin of the right thyroid lobe measuring 1.2 x 0.9 cm. Multiple additional underlying thyroid nodules are suspected but not well visualized. There are questionable perithyroidal lymph nodes, 1 of which appears along the right tracheal wall, measuring 0.6 cm      Follow up required: Follow up with PCP   Follow up should be done within 2 week(s)    Please notify the following clinician to assist with the follow up:   Dr. Ni    Reviewed with patient and daughter. Confirmed that they have follow-up scheduled for an Ultrasound.

## 2024-02-03 VITALS
HEIGHT: 65 IN | OXYGEN SATURATION: 98 % | RESPIRATION RATE: 18 BRPM | BODY MASS INDEX: 22.9 KG/M2 | DIASTOLIC BLOOD PRESSURE: 54 MMHG | HEART RATE: 66 BPM | TEMPERATURE: 98 F | SYSTOLIC BLOOD PRESSURE: 138 MMHG

## 2024-02-03 PROBLEM — G93.40 ACUTE ENCEPHALOPATHY: Status: ACTIVE | Noted: 2024-02-03

## 2024-02-03 PROBLEM — R41.0 DELIRIUM: Status: ACTIVE | Noted: 2024-02-03

## 2024-02-03 LAB
GLUCOSE SERPL-MCNC: 142 MG/DL (ref 65–140)
GLUCOSE SERPL-MCNC: 256 MG/DL (ref 65–140)

## 2024-02-03 PROCEDURE — 97116 GAIT TRAINING THERAPY: CPT

## 2024-02-03 PROCEDURE — 99024 POSTOP FOLLOW-UP VISIT: CPT | Performed by: ORTHOPAEDIC SURGERY

## 2024-02-03 PROCEDURE — 82948 REAGENT STRIP/BLOOD GLUCOSE: CPT

## 2024-02-03 PROCEDURE — 99239 HOSP IP/OBS DSCHRG MGMT >30: CPT | Performed by: SURGERY

## 2024-02-03 PROCEDURE — 97530 THERAPEUTIC ACTIVITIES: CPT

## 2024-02-03 PROCEDURE — NC001 PR NO CHARGE: Performed by: SURGERY

## 2024-02-03 RX ORDER — ACETAMINOPHEN 325 MG/1
975 TABLET ORAL EVERY 8 HOURS PRN
Start: 2024-02-03

## 2024-02-03 RX ORDER — OXYCODONE HYDROCHLORIDE 5 MG/1
TABLET ORAL
Qty: 20 TABLET | Refills: 0 | Status: SHIPPED | OUTPATIENT
Start: 2024-02-03 | End: 2024-02-15 | Stop reason: SDUPTHER

## 2024-02-03 RX ORDER — LIDOCAINE 50 MG/G
1 PATCH TOPICAL DAILY
Qty: 15 PATCH | Refills: 0 | Status: SHIPPED | OUTPATIENT
Start: 2024-02-03

## 2024-02-03 RX ORDER — SENNA AND DOCUSATE SODIUM 50; 8.6 MG/1; MG/1
1 TABLET, FILM COATED ORAL 2 TIMES DAILY
Qty: 60 TABLET | Refills: 0 | Status: SHIPPED | OUTPATIENT
Start: 2024-02-03

## 2024-02-03 RX ORDER — INSULIN LISPRO 100 [IU]/ML
3 INJECTION, SOLUTION INTRAVENOUS; SUBCUTANEOUS
Status: DISCONTINUED | OUTPATIENT
Start: 2024-02-03 | End: 2024-02-03 | Stop reason: HOSPADM

## 2024-02-03 RX ADMIN — INSULIN LISPRO 3 UNITS: 100 INJECTION, SOLUTION INTRAVENOUS; SUBCUTANEOUS at 12:35

## 2024-02-03 RX ADMIN — ACETAMINOPHEN 975 MG: 325 TABLET, FILM COATED ORAL at 00:47

## 2024-02-03 RX ADMIN — ACETAMINOPHEN 975 MG: 325 TABLET, FILM COATED ORAL at 13:33

## 2024-02-03 RX ADMIN — ENOXAPARIN SODIUM 30 MG: 30 INJECTION SUBCUTANEOUS at 00:47

## 2024-02-03 RX ADMIN — AMLODIPINE BESYLATE 10 MG: 10 TABLET ORAL at 11:04

## 2024-02-03 RX ADMIN — INSULIN LISPRO 2 UNITS: 100 INJECTION, SOLUTION INTRAVENOUS; SUBCUTANEOUS at 12:36

## 2024-02-03 RX ADMIN — LEVOTHYROXINE SODIUM 25 MCG: 25 TABLET ORAL at 07:56

## 2024-02-03 RX ADMIN — Medication 3 MG: at 00:47

## 2024-02-03 RX ADMIN — ACETAMINOPHEN 975 MG: 325 TABLET, FILM COATED ORAL at 07:56

## 2024-02-03 RX ADMIN — LIDOCAINE 5% 1 PATCH: 700 PATCH TOPICAL at 11:05

## 2024-02-03 RX ADMIN — OXYCODONE HYDROCHLORIDE 5 MG: 5 TABLET ORAL at 13:33

## 2024-02-03 RX ADMIN — OXYCODONE HYDROCHLORIDE 5 MG: 5 TABLET ORAL at 08:34

## 2024-02-03 RX ADMIN — PAROXETINE HYDROCHLORIDE 20 MG: 20 TABLET, FILM COATED ORAL at 11:04

## 2024-02-03 NOTE — ASSESSMENT & PLAN NOTE
Lab Results   Component Value Date    HGBA1C 6.2 (H) 01/27/2023       Recent Labs     02/02/24  1138 02/02/24  1626 02/02/24 2047 02/03/24  0830   POCGLU 245* 323* 242* 142*         Blood Sugar Average: Last 72 hrs:  (P) 187.4    Hold home metformin. SSI while IP.  Glucose has been elevated over the past 24 hours.  Will start mealtime insulin as well.

## 2024-02-03 NOTE — PLAN OF CARE
Problem: PAIN - ADULT  Goal: Verbalizes/displays adequate comfort level or baseline comfort level  Description: Interventions:  - Encourage patient to monitor pain and request assistance  - Assess pain using appropriate pain scale  - Administer analgesics based on type and severity of pain and evaluate response  - Implement non-pharmacological measures as appropriate and evaluate response  - Consider cultural and social influences on pain and pain management  - Notify physician/advanced practitioner if interventions unsuccessful or patient reports new pain  Outcome: Progressing     Problem: INFECTION - ADULT  Goal: Absence or prevention of progression during hospitalization  Description: INTERVENTIONS:  - Assess and monitor for signs and symptoms of infection  - Monitor lab/diagnostic results  - Monitor all insertion sites, i.e. indwelling lines, tubes, and drains  - Monitor endotracheal if appropriate and nasal secretions for changes in amount and color  - Wheeler appropriate cooling/warming therapies per order  - Administer medications as ordered  - Instruct and encourage patient and family to use good hand hygiene technique  - Identify and instruct in appropriate isolation precautions for identified infection/condition  Outcome: Progressing  Goal: Absence of fever/infection during neutropenic period  Description: INTERVENTIONS:  - Monitor WBC    Outcome: Progressing     Problem: SAFETY ADULT  Goal: Patient will remain free of falls  Description: INTERVENTIONS:  - Educate patient/family on patient safety including physical limitations  - Instruct patient to call for assistance with activity   - Consult OT/PT to assist with strengthening/mobility   - Keep Call bell within reach  - Keep bed low and locked with side rails adjusted as appropriate  - Keep care items and personal belongings within reach  - Initiate and maintain comfort rounds  - Make Fall Risk Sign visible to staff  - Apply yellow socks and bracelet  for high fall risk patients  - Consider moving patient to room near nurses station  Outcome: Progressing  Goal: Maintain or return to baseline ADL function  Description: INTERVENTIONS:  -  Assess patient's ability to carry out ADLs; assess patient's baseline for ADL function and identify physical deficits which impact ability to perform ADLs (bathing, care of mouth/teeth, toileting, grooming, dressing, etc.)  - Assess/evaluate cause of self-care deficits   - Assess range of motion  - Assess patient's mobility; develop plan if impaired  - Assess patient's need for assistive devices and provide as appropriate  - Encourage maximum independence but intervene and supervise when necessary  - Involve family in performance of ADLs  - Assess for home care needs following discharge   - Consider OT consult to assist with ADL evaluation and planning for discharge  - Provide patient education as appropriate  Outcome: Progressing  Goal: Maintains/Returns to pre admission functional level  Description: INTERVENTIONS:  - Perform AM-PAC 6 Click Basic Mobility/ Daily Activity assessment daily.  - Set and communicate daily mobility goal to care team and patient/family/caregiver.   - Collaborate with rehabilitation services on mobility goals if consulted  - Out of bed for toileting  - Record patient progress and toleration of activity level   Outcome: Progressing     Problem: DISCHARGE PLANNING  Goal: Discharge to home or other facility with appropriate resources  Description: INTERVENTIONS:  - Identify barriers to discharge w/patient and caregiver  - Arrange for needed discharge resources and transportation as appropriate  - Identify discharge learning needs (meds, wound care, etc.)  - Arrange for interpretive services to assist at discharge as needed  - Refer to Case Management Department for coordinating discharge planning if the patient needs post-hospital services based on physician/advanced practitioner order or complex needs  related to functional status, cognitive ability, or social support system  Outcome: Progressing     Problem: Knowledge Deficit  Goal: Patient/family/caregiver demonstrates understanding of disease process, treatment plan, medications, and discharge instructions  Description: Complete learning assessment and assess knowledge base.  Interventions:  - Provide teaching at level of understanding  - Provide teaching via preferred learning methods  Outcome: Progressing

## 2024-02-03 NOTE — PLAN OF CARE
Problem: INFECTION - ADULT  Goal: Absence or prevention of progression during hospitalization  Description: INTERVENTIONS:  - Assess and monitor for signs and symptoms of infection  - Monitor lab/diagnostic results  - Monitor all insertion sites, i.e. indwelling lines, tubes, and drains  - Monitor endotracheal if appropriate and nasal secretions for changes in amount and color  - [place] appropriate cooling/warming therapies per order  - Administer medications as ordered  - Instruct and encourage patient and family to use good hand hygiene technique  - Identify and instruct in appropriate isolation precautions for identified infection/condition  2/3/2024 1300 by Keily Pelayo  Outcome: Adequate for Discharge  2/3/2024 1259 by Keily Pelayo  Outcome: Progressing  Goal: Absence of fever/infection during neutropenic period  Description: INTERVENTIONS:  - Monitor WBC    2/3/2024 1300 by Keily Pelayo  Outcome: Adequate for Discharge  2/3/2024 1259 by Keily Pelayo  Outcome: Progressing     Problem: SAFETY ADULT  Goal: Patient will remain free of falls  Description: INTERVENTIONS:  - Educate patient/family on patient safety including physical limitations  - Instruct patient to call for assistance with activity   - Consult OT/PT to assist with strengthening/mobility   - Keep Call bell within reach  - Keep bed low and locked with side rails adjusted as appropriate  - Keep care items and personal belongings within reach  - Initiate and maintain comfort rounds  - Make Fall Risk Sign visible to staff  - Obtain necessary fall risk management equipment:   - Apply yellow socks and bracelet for high fall risk patients  - Consider moving patient to room near nurses station  2/3/2024 1300 by Keily Pelayo  Outcome: Adequate for Discharge  2/3/2024 1259 by Keily Pelayo  Outcome: Progressing  Goal: Maintain or return to baseline ADL function  Description: INTERVENTIONS:  -  Assess patient's ability to carry out ADLs; assess patient's  baseline for ADL function and identify physical deficits which impact ability to perform ADLs (bathing, care of mouth/teeth, toileting, grooming, dressing, etc.)  - Assess/evaluate cause of self-care deficits   - Assess range of motion  - Assess patient's mobility; develop plan if impaired  - Assess patient's need for assistive devices and provide as appropriate  - Encourage maximum independence but intervene and supervise when necessary  - Involve family in performance of ADLs  - Assess for home care needs following discharge   - Consider OT consult to assist with ADL evaluation and planning for discharge  - Provide patient education as appropriate  2/3/2024 1300 by Keily Pelayo  Outcome: Adequate for Discharge  2/3/2024 1259 by Keily Pelayo  Outcome: Progressing  Goal: Maintains/Returns to pre admission functional level  Description: INTERVENTIONS:  - Perform AM-PAC 6 Click Basic Mobility/ Daily Activity assessment daily.  - Set and communicate daily mobility goal to care team and patient/family/caregiver.   - Collaborate with rehabilitation services on mobility goals if consulted  - Out of bed for toileting  - Record patient progress and toleration of activity level   2/3/2024 1300 by Keily Pelayo  Outcome: Adequate for Discharge  2/3/2024 1259 by Keily Pelayo  Outcome: Progressing     Problem: DISCHARGE PLANNING  Goal: Discharge to home or other facility with appropriate resources  Description: INTERVENTIONS:  - Identify barriers to discharge w/patient and caregiver  - Arrange for needed discharge resources and transportation as appropriate  - Identify discharge learning needs (meds, wound care, etc.)  - Arrange for interpretive services to assist at discharge as needed  - Refer to Case Management Department for coordinating discharge planning if the patient needs post-hospital services based on physician/advanced practitioner order or complex needs related to functional status, cognitive ability, or social  support system  2/3/2024 1300 by Keily Pelayo  Outcome: Adequate for Discharge  2/3/2024 1259 by Keily Pelayo  Outcome: Progressing     Problem: Knowledge Deficit  Goal: Patient/family/caregiver demonstrates understanding of disease process, treatment plan, medications, and discharge instructions  Description: Complete learning assessment and assess knowledge base.  Interventions:  - Provide teaching at level of understanding  - Provide teaching via preferred learning methods  2/3/2024 1300 by Keily Pelayo  Outcome: Adequate for Discharge  2/3/2024 1259 by Keily Pelayo  Outcome: Progressing

## 2024-02-03 NOTE — PROGRESS NOTES
"Progress Note - Orthopedics   Eulalio Raphael 78 y.o. female MRN: [id number]  Unit/Bed#: W -01 Encounter: [id number]    Assessment:  Status post left proximal humerus fracture with reversed total shoulder arthroplasty by Dr. Kaufman on 1 February 2024    Plan:  Continue with nonweightbearing and remain in sling.  She can mobilize otherwise and work on discharge planning.    Weight bearing: Nonweightbearing left upper extremity    Subjective: This is a 78-year-old woman who is status post RSA for her proximal humeral fracture by Dr. Kaufman on 1 February 2024.  She is doing quite well.  She is having minimal pain.  She is currently in a sling in bed.  Her daughter states she became confused overnight.  She is more alert today.    Vitals: Blood pressure 144/56, pulse 66, temperature 98 °F (36.7 °C), resp. rate 18, height 5' 5\" (1.651 m), SpO2 98%.,Body mass index is 22.9 kg/m².    No intake or output data in the 24 hours ending 02/03/24 0925    Invasive Devices       Peripheral Intravenous Line  Duration             Peripheral IV 01/30/24 Right Antecubital 4 days    Peripheral IV 02/01/24 Right Hand 2 days                    Physical Exam: She is currently in a sling.  She is intact in terms of motor and sensory for her radial median and ulnar nerves.  She struggles a little bit with understanding the exam but it looks like her motor exam is completely intact.  Her alignment looks good.  There is no sign of other issues.  Dressings are clean and dry.  Compartments are soft.    Lab, Imaging and other studies: CBC: No results found for: \"WBC\", \"HGB\", \"HCT\", \"MCV\", \"PLT\", \"ADJUSTEDWBC\", \"RBC\", \"MCH\", \"MCHC\", \"RDW\", \"MPV\", \"NRBC\"  CMP: No results found for: \"SODIUM\", \"CL\", \"CO2\", \"ANIONGAP\", \"BUN\", \"CREATININE\", \"GLUCOSE\", \"CALCIUM\", \"AST\", \"ALT\", \"ALKPHOS\", \"PROT\", \"BILITOT\", \"EGFR\"  PT/INR: No results found for: \"PT\", \"INR\"      "

## 2024-02-03 NOTE — PROGRESS NOTES
[facility]  Progress Note  Name: Eulalio Raphael I  MRN: [id number]  Unit/Bed#: W -01 I Date of Admission: 1/30/2024   Date of Service: 2/3/2024 I Hospital Day: 3    Assessment/Plan   Fall  Assessment & Plan  Mechanical fall   Injuries listed below  PT/OT    Closed nondisplaced fracture of left clavicle  Assessment & Plan  Secondary to fall  CT chest showed left proximal clavicular fracture    Ortho consulted and note appreciated  NWB LLE  Multimodal pain regimen  PT/OT    * Closed left humeral fracture  Assessment & Plan  Secondary to fall  CT imaging showed: Comminuted fracture through the surgical neck of the humerus. The humeral head articular surface, greater tuberosity, lesser tuberosity and shaft are all separate fracture fragments. The greater and lesser tuberosities are not distinctly appreciated given the high degree of comminution. There is significant angulation between the humeral shaft and remaining fracture components  Ortho consulted and note appreciated.   2/1 surgical fixation of left proximal humerus fracture with a reverse total shoulder arthroplasty   NWB LLE--arm sling  Multimodal pain regimen initiated.   Follow up with Ortho as an OP    Nasal bone fracture  Assessment & Plan  Secondary to fall  CT imaging showed: Suspected nondisplaced left nasal bone fracture with overlying soft tissue swelling.   OMFS consulted and note appreciated. Non operative management  Analgesia as needed. Ice to face for the first 24 hours.  Nasal precautions  FU with OMFS as an OP    Acute encephalopathy  Assessment & Plan  Patient is noted to get confused at night, improved in the morning.  We discussed the possibility of hospital associated delirium.  No focal symptoms.  Will monitor throughout the day and possibly discharge later today if stable.  Daughter believes that she will do better at home.  Geriatrics consulted and note appreciated.    Recurrent depression  "([facility])  Assessment & Plan  Continue home paxil    Acquired hypothyroidism  Assessment & Plan  Continue home levothyroxine    Type 2 diabetes mellitus without complication, without long-term current use of insulin ([facility])  Assessment & Plan  Lab Results   Component Value Date    HGBA1C 6.2 (H) 01/27/2023       Recent Labs     02/02/24  1138 02/02/24  1626 02/02/24  2047 02/03/24  0830   POCGLU 245* 323* 242* 142*         Blood Sugar Average: Last 72 hrs:  (P) 187.4    Hold home metformin. SSI while IP.  Glucose has been elevated over the past 24 hours.  Will start mealtime insulin as well.      Essential hypertension  Assessment & Plan  Continue home amlodipine              Bowel Regimen: Senna  VTE Prophylaxis:Sequential compression device (Venodyne)  and Enoxaparin (Lovenox)     Disposition: Discharge home with daughter-anticipate within the next 24-48 hours.    Subjective   Chief Complaint: \"It was worse last night\"    Subjective: Patient notes having worsened pain last night and into the morning.  She states that she just got pain medicine and is starting to feel better.  She also notes hallucinations overnight, seeing dead family members, and being agitated.  Patient is apologetic about it this morning.  She notes remembering all of the events.  Otherwise, she denies any new or worsening complaints.     Objective   Vitals:   Temp:  [97.3 °F (36.3 °C)-98 °F (36.7 °C)] 98 °F (36.7 °C)  HR:  [58-74] 66  Resp:  [18] 18  BP: (124-144)/(48-64) 144/56    I/O         02/01 0701  02/02 0700 02/02 0701  02/03 0700 02/03 0701  02/04 0700    I.V. 1700      IV Piggyback 150      Total Intake 1850      Urine 300      Blood 250      Total Output 550      Net +1300                      Physical Exam:   GENERAL APPEARANCE: No acute distress.  Sitting upright in chair eating breakfast.  NEURO: GCS is 15.  Light touch sensation intact in all extremities.   HEENT: Normocephalic, atraumatic.  Neck supple.  CV: Regular rate and rhythm.  " "+2 radial dorsalis pedis pulses, bilaterally.  LUNGS: Clear to auscultation, bilaterally.  Chest wall is nontender on palpation.  GI: Abdomen is soft nontender.  : Pelvis is stable.  MSK: Left upper extremity is swollen with posterior to arm sling applied.  Patient is able to range fingers and wrist on the upper extremity.  All other extremities display no no swelling or deformities and have full range of motion.  SKIN: Left shoulder surgical dressing is clean, dry, intact.  No erythema or excessive warmth associated with wound.  Skin is otherwise warm and dry.    Invasive Devices       Peripheral Intravenous Line  Duration             Peripheral IV 01/30/24 Right Antecubital 4 days    Peripheral IV 02/01/24 Right Hand 2 days                          Lab Results: Results: I have personally reviewed all pertinent laboratory/tests results, BMP/CMP: No results found for: \"SODIUM\", \"K\", \"CL\", \"CO2\", \"ANIONGAP\", \"BUN\", \"CREATININE\", \"GLUCOSE\", \"CALCIUM\", \"AST\", \"ALT\", \"ALKPHOS\", \"PROT\", \"BILITOT\", \"EGFR\", and CBC: No results found for: \"WBC\", \"HGB\", \"HCT\", \"MCV\", \"PLT\", \"ADJUSTEDWBC\", \"RBC\", \"MCH\", \"MCHC\", \"RDW\", \"MPV\", \"NRBC\"  Imaging: I have personally reviewed pertinent reports.     Other Studies: none  Daughter is at bedside and was updated on patient's status.       "

## 2024-02-03 NOTE — PHYSICAL THERAPY NOTE
PHYSICAL THERAPY NOTE          Patient Name: Eulalio Raphael  Today's Date: 2/3/2024           02/03/24 1342   PT Last Visit   PT Visit Date 02/03/24   Note Type   Note Type Treatment   Pain Assessment   Pain Assessment Tool 0-10   Pain Score 8  (spoke to RN and pt given pain medication mid tx session)   Pain Location/Orientation Orientation: Left;Location: Arm  (L UE brace adjusted in todays tx session)   Pain Onset/Description Onset: Ongoing   Effect of Pain on Daily Activities limits activity tolerance   Patient's Stated Pain Goal No pain   Hospital Pain Intervention(s) Repositioned;Ambulation/increased activity;Emotional support;Rest   Multiple Pain Sites No   Pain Rating: FLACC (Rest) - Face 1   Pain Rating: FLACC (Rest) - Legs 0   Pain Rating: FLACC (Rest) - Activity 0   Pain Rating: FLACC (Rest) - Cry 0   Pain Rating: FLACC (Rest) - Consolability 0   Score: FLACC (Rest) 1   Restrictions/Precautions   Weight Bearing Precautions Per Order Yes   LUE Weight Bearing Per Order (S)  NWB  (L UE)   Braces or Orthoses   (L UE abduction sling)   Other Precautions Chair Alarm;Bed Alarm;WBS;Limb alert;Fall Risk;Pain  (masimo)   General   Chart Reviewed Yes   Response to Previous Treatment Patient with no complaints from previous session.   Family/Caregiver Present Yes   Cognition   Overall Cognitive Status WFL   Arousal/Participation Alert;Responsive;Cooperative   Attention Within functional limits   Orientation Level Oriented X4   Memory Within functional limits   Following Commands Follows one step commands with increased time or repetition   Comments pt was cooperative in todays tx session but required several VC's for hand placement and SBQC management   Bed Mobility   Additional Comments pt seated OOB in the recliner pre/post tx session   Transfers   Sit to Stand 5  Supervision   Additional items Assist x 1;Armrests;Increased time  required;Verbal cues   Stand to Sit 5  Supervision   Additional items Assist x 1;Armrests;Increased time required;Verbal cues   Stand pivot 5  Supervision   Additional items Assist x 1;Armrests;Increased time required;Verbal cues   Toilet transfer 4  Minimal assistance   Additional items Assist x 1;Increased time required;Standard toilet;Other  (grab bars)   Additional Comments pt was able to complete functional transfers with SBQC and close /s w/ VC's for hand placement and SBQC management/positioning   Ambulation/Elevation   Gait pattern Decreased foot clearance;Short stride;Excessively slow;Foward flexed   Gait Assistance 4  Minimal assist   Additional items Assist x 1;Verbal cues   Assistive Device Small base quad cane  (utilized in R UE)   Distance 30'x1 SBQC and 15'x2 SBQC   Stair Management Assistance Not tested  (pt refusing stair trials in todays tx session due to increased pain in L UE 8/10 RN made aware via tiger text)   Balance   Static Sitting Fair +   Dynamic Sitting Fair   Static Standing Poor +   Dynamic Standing Poor +   Ambulatory Poor +  (SBQC)   Endurance Deficit   Endurance Deficit Yes   Endurance Deficit Description limited activity tolerance, functional mobility and ambulation distance   Activity Tolerance   Activity Tolerance Patient limited by fatigue;Other (Comment)  (increased pain)   Nurse Made Aware Spoke to RN Keily   Assessment   Problem List Decreased range of motion;Decreased endurance;Decreased strength;Impaired balance;Decreased mobility;Decreased safety awareness;Pain;Orthopedic restrictions  (L UE Edema)   Assessment pt began tx session seated OOB in the recliner and was agreeable to participate in PT intervention. pt and care giver educated on use of SBQC with functional transfers and ambulation with verbal and visual demonstration. pt and care giver verbalized understanding prior to initiating PT intervention. Progress was noted this tx session as pt was able to perform multipe  functional transfers with SBQC and VC's for R UE placement with /s, no LOB. pt was able to increase activity tolerance and ambulation distance compared to previous PT intervention as pt ambulated 30'x1 and 15'x2 SBQC with min ax1 for increased safety and balance. pt would benefit from continued skilled PT intervention in order to increase activity tolerance, funcitonal mobility, steps completed as pt silvana remain at an increased risk for falls and injuries due to limited ambulation distnace, inconsistant blanca with SBQC. Post tx pt in recliner with call bell, chair alarm activated, family present   Goals   Patient Goals to go home   STG Expiration Date 02/03/24   PT Treatment Day 2   Plan   Treatment/Interventions Functional transfer training;LE strengthening/ROM;Therapeutic exercise;Endurance training;Patient/family training;Equipment eval/education;Bed mobility;Gait training;Spoke to nursing   Progress Slow progress, decreased activity tolerance   PT Frequency 3-5x/wk   Discharge Recommendation   Rehab Resource Intensity Level, PT II (Moderate Resource Intensity)   Additional Comments recommend SBQC for all OOB mobility   AM-PAC Basic Mobility Inpatient   Turning in Flat Bed Without Bedrails 3   Lying on Back to Sitting on Edge of Flat Bed Without Bedrails 3   Moving Bed to Chair 3   Standing Up From Chair Using Arms 3   Walk in Room 3   Climb 3-5 Stairs With Railing 2   Basic Mobility Inpatient Raw Score 17   Basic Mobility Standardized Score 39.67   Highest Level Of Mobility   JH-HLM Goal 5: Stand one or more mins   JH-HLM Achieved 7: Walk 25 feet or more   Education   Education Provided Mobility training;Assistive device;Other  (functional transfers)   Patient Demonstrates acceptance/verbal understanding   End of Consult   Patient Position at End of Consult Bedside chair;Bed/Chair alarm activated;All needs within reach   The patient's AM-PAC Basic Mobility Inpatient Short Form Raw Score is 17. A Raw score of  greater than 16 suggests the patient may benefit from discharge to home. Please also refer to the recommendation of the Physical Therapist for safe discharge planning.    Madhav Gallardo

## 2024-02-03 NOTE — DISCHARGE SUMMARY
"  Discharge Summary - Eulalio Raphael 78 y.o. female MRN: [id number]    Unit/Bed#: W -01 Encounter: [id number]    Admission Date:   Admission Orders (From admission, onward)       Ordered        01/31/24 0054  Inpatient Admission  Once                            Admitting Diagnosis: Generalized anxiety disorder [F41.1]  Acquired hypothyroidism [E03.9]  Recurrent depression (HCC) [F33.9]  Closed fracture of nasal bone, initial encounter [S02.2XXA]  Closed displaced fracture of surgical neck of left humerus, unspecified fracture morphology, initial encounter [S42.212A]  Fracture of unspecified part of left clavicle, initial encounter for closed fracture [S42.002A]  Type 2 diabetes mellitus without complication, without long-term current use of insulin (HCC) [E11.9]  Lymphedema of left arm [I89.0]    HPI: \"Eulalio Raphael is a 78 y.o. female with hx of HTN, DMT2, hypothyroidism, FELY, lymphedema of left arm, breast cancer in remission, papillary thyroid cancer in remission, depression who presents via EMS from home after reported fall down 15 steps at home. Patient says she was getting ready to go down the steps when she lost her footing and slipped, tumbling down 15 steps. Family found her at the bottom of the steps on hands and knees, with blood coming out of her nose. She denies any LOC. She denies any preceding chest pain, sob, lightheadedness prior to fall. She is not on any blood thinners or ASA. In the ED, was found to have left proximal clavicle fracture and left proximal humeral fracture with displacement, as well as nasal bone fractures. Reviewed current meds with patient and daughter.\" - Per Veronica Callejas's H&P on 1/31/2024    Procedures Performed:   Orders Placed This Encounter   Procedures    ED ECG Documentation Only       Summary of Hospital Course: This is a 78-year-old female who suffered a fall down multiple steps resulting in multiple traumatic injuries including bilateral nasal bone " fractures, left humerus fracture, and left clavicle fracture.  She was admitted to the trauma service with consultation to OMFS for her nasal bone fracture.  They deemed her nonoperative management and she is to follow-up with them in 1 week as an outpatient.  Regarding her humerus fracture, she was taken to the OR on 2/1 where she underwent surgical fixation.  She did well postoperatively.  She was started on a multimodal pain regimen to assist with pain control.  Pain has been controlled with oral regimen.  She does have lymphedema in the left upper extremity at baseline.  Regarding her left clavicle she is to be nonweightbearing and she was placed in a arm sling.  She is to follow-up with orthopedics as an outpatient.  Vital signs and laboratory findings have been stable prior to discharge.  She was evaluated by PT/OT and cleared for discharge home with VNA and family support.  Daughter has been at bedside throughout hospitalization and was regularly updated.  She confirmed that she feels comfortable taking her mother home today and feels able to care for her.  Prior to discharge we did review hospitalization, diagnostic studies, discharge medications, return precautions, required follow-up-patient and daughter do not have any questions at the completion of her conversation.    Significant Findings, Care, Treatment and Services Provided:   XR shoulder 1 vw left    Result Date: 2/1/2024  Impression: Fluoroscopic guidance provided for procedure guidance.  Please refer to the separate procedure notes for additional details. Workstation performed: [id number]     XR humerus left    Result Date: 2/1/2024  Impression: Comminuted, displaced left proximal humerus fracture with little change in alignment from prior study. Workstation performed: [id number]     XR hand 3+ views LEFT    Result Date: 1/31/2024  Impression: No acute osseous abnormality. Workstation performed: [id number]     CT upper extremity wo contrast  "left    Result Date: 1/31/2024  Impression: 1.  Highly comminuted proximal humeral fracture as described 2.  Clavicular tip fracture Workstation performed: [id number]     XR chest 1 view portable    Result Date: 1/31/2024  Impression: No focal consolidation or pneumothorax. Left shoulder and clavicular fractures. Thyroid goiter.. Workstation performed: [id number]     XR shoulder 2+ views LEFT    Result Date: 1/31/2024  Impression: Comminuted left shoulder fracture as detailed above.. Workstation performed: [id number]     XR forearm 2 views LEFT    Result Date: 1/31/2024  Impression: Comminuted left shoulder fracture as detailed above.. Workstation performed: [id number]     XR humerus LEFT    Result Date: 1/31/2024  Impression: 1.  Comminuted proximal humeral fracture 2.  Likely coracoid tip fracture Workstation performed: [id number]     XR hip/pelv 2-3 vws left    Result Date: 1/31/2024  Impression: No acute osseous abnormality. Workstation performed: [id number]     CT chest wo contrast    Result Date: 1/30/2024  Impression: 1.  Left proximal clavicular fracture 2.  Left proximal humeral fracture 3.  Thyroid goiter with resolution of rightward tracheal deviation Workstation performed: [id number]     CT head without contrast    Result Date: 1/30/2024  Impression: Progression of now moderate chronic microangiopathic change with no acute intracranial abnormality identified. Suspected nondisplaced left nasal bone fracture with overlying soft tissue swelling. There is hyperdense material layering within the left maxillary sinus which could represent hemorrhage within the sinus, sequela of an occult nondisplaced fracture. This examination was marked \"immediate notification\" in Epic in order to begin the standard process by which the radiology reading room liaison alerts the referring practitioner. Workstation performed: [id number]     CT cervical spine without contrast    Result Date: 1/30/2024  Impression: No cervical spine " fracture or traumatic malalignment. Multinodular goiter with multiple calcified right thyroid nodules. Incompletely visualized soft tissue mass in the right upper thorax, possibly extrapleural for which a dedicated CT chest with contrast is recommended. The study was marked in EPIC for significant notification. Workstation performed: [id number]        Complications: none    Discharge Diagnosis: Nasal bone fracture, Left clavicle fracture, Left humerus fracture, Fall    Medical Problems       Resolved Problems  Date Reviewed: 2/3/2024   None         Condition at Discharge: good         Discharge instructions/Information to patient and family:   See after visit summary for information provided to patient and family.      Provisions for Follow-Up Care:  See after visit summary for information related to follow-up care and any pertinent home health orders.      PCP: Lovely Kim DO    Disposition: Home with VNA    Planned Readmission: No      Discharge Statement   I spent 25 minutes discharging the patient. This time was spent on the day of discharge. I had direct contact with the patient on the day of discharge. Additional documentation is required if more than 30 minutes were spent on discharge.     Discharge Medications:  See after visit summary for reconciled discharge medications provided to patient and family.

## 2024-02-03 NOTE — ASSESSMENT & PLAN NOTE
Patient is noted to get confused at night, improved in the morning.  We discussed the possibility of hospital associated delirium.  No focal symptoms.  Will monitor throughout the day and possibly discharge later today if stable.  Daughter believes that she will do better at home.  Geriatrics consulted and note appreciated.

## 2024-02-03 NOTE — PLAN OF CARE
Problem: PHYSICAL THERAPY ADULT  Goal: Performs mobility at highest level of function for planned discharge setting.  See evaluation for individualized goals.  Description: Treatment/Interventions: Functional transfer training, LE strengthening/ROM, Therapeutic exercise, Endurance training, Gait training, Bed mobility, Equipment eval/education, Patient/family training, Elevations          See flowsheet documentation for full assessment, interventions and recommendations.  Outcome: Progressing  Note:    Problem List: Decreased range of motion, Decreased endurance, Decreased strength, Impaired balance, Decreased mobility, Decreased safety awareness, Pain, Orthopedic restrictions (L UE Edema)  Assessment: pt began tx session seated OOB in the recliner and was agreeable to participate in PT intervention. pt and care giver educated on use of SBQC with functional transfers and ambulation with verbal and visual demonstration. pt and care giver verbalized understanding prior to initiating PT intervention. Progress was noted this tx session as pt was able to perform multipe functional transfers with SBQC and VC's for R UE placement with /s, no LOB. pt was able to increase activity tolerance and ambulation distance compared to previous PT intervention as pt ambulated 30'x1 and 15'x2 SBQC with min ax1 for increased safety and balance. pt would benefit from continued skilled PT intervention in order to increase activity tolerance, funcitonal mobility, steps completed as pt silvana remain at an increased risk for falls and injuries due to limited ambulation distnace, inconsistant blanca with SBQC. Post tx pt in recliner with call bell, chair alarm activated, family present        Rehab Resource Intensity Level, PT: II (Moderate Resource Intensity)    See flowsheet documentation for full assessment.

## 2024-02-05 ENCOUNTER — TRANSITIONAL CARE MANAGEMENT (OUTPATIENT)
Dept: FAMILY MEDICINE CLINIC | Facility: CLINIC | Age: 79
End: 2024-02-05

## 2024-02-05 DIAGNOSIS — Z71.89 COORDINATION OF COMPLEX CARE: Primary | ICD-10-CM

## 2024-02-05 LAB
DME PARACHUTE DELIVERY DATE ACTUAL: NORMAL
DME PARACHUTE DELIVERY DATE REQUESTED: NORMAL
DME PARACHUTE ITEM DESCRIPTION: NORMAL
DME PARACHUTE ITEM DESCRIPTION: NORMAL
DME PARACHUTE ORDER STATUS: NORMAL
DME PARACHUTE SUPPLIER NAME: NORMAL
DME PARACHUTE SUPPLIER PHONE: NORMAL

## 2024-02-06 ENCOUNTER — PATIENT OUTREACH (OUTPATIENT)
Dept: FAMILY MEDICINE CLINIC | Facility: CLINIC | Age: 79
End: 2024-02-06

## 2024-02-06 NOTE — PROGRESS NOTES
HRR referral received via IB. Chart reviewed. Patient admitted to IP at [facility] 1/30-2/3 under trauma services s/p fall down 15 steps, sustaining L humeral fracture. Patient underwent ORIF of left shoulder on 2/1. Also sustained L clavicle fractur and closed fracture of nasal bone.     PMH: HTN, DM 2 on Metformin last A1C was 6.2 on 1/27/24, Lymphedema L arm, breast cancer in remission    Patient lives with her daughter and independent of ADL's and IADL's prior to admit. No DME. Family transports.    This RNCM called patient and patient requested I speak with her daughter Ro whom she lives with. Ro states patient is doing fine. She is receiving PT through [facility]. She states patient has all of her medications and takes them as directed. She declined reviewing. She states patient is having some break through pain in Left arm and she is going to call patients doctor to see if they can adjust pain medications. She declines need for halp. She will call herself. Ro is aware patient needs a PCP KODY made within 7 days. She states she will make this apt as well.    Patient has cane and BSC that was ordered.    Patients daughter Ro declines CCM services or follow up. Declined my contact information. She states she has an apt in 2 weeks with Ortho, not noted in chart. Ro will transport patient to apt's.

## 2024-02-07 ENCOUNTER — OFFICE VISIT (OUTPATIENT)
Dept: FAMILY MEDICINE CLINIC | Facility: CLINIC | Age: 79
End: 2024-02-07
Payer: COMMERCIAL

## 2024-02-07 VITALS
SYSTOLIC BLOOD PRESSURE: 132 MMHG | DIASTOLIC BLOOD PRESSURE: 50 MMHG | RESPIRATION RATE: 16 BRPM | TEMPERATURE: 97.1 F | HEART RATE: 49 BPM

## 2024-02-07 DIAGNOSIS — E11.9 TYPE 2 DIABETES MELLITUS WITHOUT COMPLICATION, WITHOUT LONG-TERM CURRENT USE OF INSULIN (HCC): ICD-10-CM

## 2024-02-07 DIAGNOSIS — C73 PAPILLARY THYROID CARCINOMA (HCC): ICD-10-CM

## 2024-02-07 DIAGNOSIS — I10 ESSENTIAL HYPERTENSION: ICD-10-CM

## 2024-02-07 DIAGNOSIS — Z78.0 POST-MENOPAUSAL: ICD-10-CM

## 2024-02-07 DIAGNOSIS — S42.002S CLOSED NONDISPLACED FRACTURE OF LEFT CLAVICLE, UNSPECIFIED PART OF CLAVICLE, SEQUELA: Primary | ICD-10-CM

## 2024-02-07 DIAGNOSIS — I89.0 LYMPHEDEMA OF LEFT ARM: ICD-10-CM

## 2024-02-07 DIAGNOSIS — S42.215S CLOSED NONDISPLACED FRACTURE OF SURGICAL NECK OF LEFT HUMERUS, UNSPECIFIED FRACTURE MORPHOLOGY, SEQUELA: ICD-10-CM

## 2024-02-07 PROBLEM — G93.40 ACUTE ENCEPHALOPATHY: Status: RESOLVED | Noted: 2024-02-03 | Resolved: 2024-02-07

## 2024-02-07 LAB — SL AMB POCT HEMOGLOBIN AIC: 6.1 (ref ?–6.5)

## 2024-02-07 PROCEDURE — 99495 TRANSJ CARE MGMT MOD F2F 14D: CPT | Performed by: FAMILY MEDICINE

## 2024-02-07 PROCEDURE — 83036 HEMOGLOBIN GLYCOSYLATED A1C: CPT | Performed by: FAMILY MEDICINE

## 2024-02-07 NOTE — ASSESSMENT & PLAN NOTE
Regarding: pain iside of stomach 2 wk's  ----- Message from Caren Campbell sent at 7/19/2023  9:03 AM CDT -----  Patient Name: Elichevia Bohannon  Caller: patient  Call Back # :[phone]    Is this for an Active episode for Home Health, Hospice or Palliative Care? No   Specialist or PCP Name: Olayinka Fernandez  Symptoms: sharp pain inside of her stomach where she had surgery done 2017, constant pain for 2 wk's pain level 7 or 8  Pregnant (females aged 13-60. If Yes, how long?) :   Name of Clinic Site / Acct[facility] : [facility]      Are you currently at (or near) your place of residence? Yes [facility] [address]     Use following scripting for patients waiting for a callback:   \"Nurse callback times vary based on call volumes; please be aware the return phone call may come from an unidentified or out of state phone number. If your symptoms worsen or become life threatening while waiting, you should seek immediate assistance by calling 911 or going to the ER for evaluation.\"     She will schedule visit with OMFS for follow up.   Surgery was not recommended during hospitalization.

## 2024-02-07 NOTE — PROGRESS NOTES
Assessment & Plan     1. Closed nondisplaced fracture of left clavicle, unspecified part of clavicle, sequela  Assessment & Plan:  Continue non weight bearing and arm sling. Management per orthopedic surgery.     Orders:  -     DXA bone density spine hip and pelvis; Future; Expected date: 02/07/2024    2. Type 2 diabetes mellitus without complication, without long-term current use of insulin (HCC)  Assessment & Plan:  Well controlled on current medication regimen of metformin.   Lab Results   Component Value Date    HGBA1C 6.1 02/07/2024       Orders:  -     POCT hemoglobin A1c    3. Essential hypertension  Comments:  Controlled.    4. Closed nondisplaced fracture of surgical neck of left humerus, unspecified fracture morphology, sequela  Assessment & Plan:  S/p reversed total shoulder arthroplasty by Dr. Kaufman on 2/1/24. She is advised to remain non weight bearing. Her daughter will be scheduling follow up appointment with Dr. Kaufman. Continue PT. She does continue to have some pain.     Orders:  -     DXA bone density spine hip and pelvis; Future; Expected date: 02/07/2024    5. Lymphedema of left arm    6. Papillary thyroid carcinoma (HCC)    7. Post-menopausal  -     DXA bone density spine hip and pelvis; Future; Expected date: 02/07/2024         Subjective     Transitional Care Management Review:   Eulalio aRphael is a 78 y.o. female here for TCM follow up.     During the TCM phone call patient stated:  TCM Call       Date and time call was made  2/6/2024  1:34 PM    Hospital care reviewed  Records reviewed    Patient was hospitialized at  [facility]    Date of Admission  01/30/24    Date of discharge  02/03/24    Diagnosis  Closed left humeral fracture    Disposition  Home    Were the patients medications reviewed and updated  Yes    Current Symptoms  None          TCM Call       Post hospital issues  None    Should patient be enrolled in anticoag monitoring?  No    Referrals needed  Tigre  Shae,  (Orthopedic)    Did you obtain your prescribed medications  Yes    Do you need help managing your prescriptions or medications  No    Is transportation to your appointment needed  No    I have advised the patient to call PCP with any new or worsening symptoms  Ella Farnsworth CMA    Living Arrangements  Family members    Are you recieving any outpatient services  No    Are you recieving home care services  Yes    Types of home care services  Home health aid; Home PT    Are you using any community resources  No    Current waiver services  No    Have you fallen in the last 12 months  Yes    How many times  2    Interperter language line needed  No    Counseling  Family          HPI  Eulalio Raphael is a 77 yo female who is here today for hospital discharge follow up after being hospitalized at [facility] from 1/30 to 2/3/24 after she suffered a fall down multiple steps resulting in multiple fractures including bilateral nasal bone fractures, left humerus fracture, and left clavicle fracture. She was admitted under the trauma service. She underwent surgery for her left humerus fracture. She was seen by OMFS for her nasal fractures and did not need surgery at the time. For her left clavicle fracture, she has an arm sling and is non weightbearing. She was advised to schedule follow up with both orthopedic surgery and OMFS on discharge which have not been scheduled yet. She is being cared for at home by her daughter who is a medical professional. She also has home visiting nurse and PT that has started. OT also ordered.   Today, Eulalio reports that her pain is at a 7/10 overall. She has been taking tylenol every 8 hours and oxycodone in between. Her daughter will reach out to surgeon's office to see if anything else can be done in terms of pain control. She is eating/drinking well. Has some constipation, but otherwise normal bowel/bladder function. Incision site with no evidence of infection or  bleeding.     Review of Systems   Constitutional: Negative.    HENT: Negative.     Eyes: Negative.    Respiratory: Negative.     Cardiovascular: Negative.    Gastrointestinal: Negative.    Endocrine: Negative.    Genitourinary: Negative.    Musculoskeletal: Negative.         Pain related to her fractures as noted above.    Skin: Negative.    Allergic/Immunologic: Negative.    Neurological: Negative.    Hematological: Negative.    Psychiatric/Behavioral: Negative.         Objective     /50   Pulse (!) 49   Temp (!) 97.1 °F (36.2 °C)   Resp 16      Physical Exam  Vitals and nursing note reviewed.   Constitutional:       General: She is not in acute distress.     Appearance: She is well-developed.   HENT:      Head: Normocephalic and atraumatic.   Eyes:      Conjunctiva/sclera: Conjunctivae normal.   Cardiovascular:      Rate and Rhythm: Normal rate and regular rhythm.      Heart sounds: Murmur heard.   Pulmonary:      Effort: Pulmonary effort is normal. No respiratory distress.      Breath sounds: Normal breath sounds.   Abdominal:      Palpations: Abdomen is soft.      Tenderness: There is no abdominal tenderness.   Musculoskeletal:         General: No swelling.      Cervical back: Neck supple.      Comments: Left upper extremity lymphedema. Left upper extremity in arm sling.    Skin:     General: Skin is warm and dry.      Capillary Refill: Capillary refill takes less than 2 seconds.   Neurological:      Mental Status: She is alert.   Psychiatric:         Mood and Affect: Mood normal.       Medications have been reviewed by provider in current encounter    Tracy Brar MD

## 2024-02-07 NOTE — ASSESSMENT & PLAN NOTE
Well controlled on current medication regimen of metformin.   Lab Results   Component Value Date    HGBA1C 6.1 02/07/2024

## 2024-02-07 NOTE — ASSESSMENT & PLAN NOTE
S/p reversed total shoulder arthroplasty by Dr. Kaufman on 2/1/24. She is advised to remain non weight bearing. Her daughter will be scheduling follow up appointment with Dr. Kaufman. Continue PT. She does continue to have some pain.

## 2024-02-15 DIAGNOSIS — S42.212A CLOSED DISPLACED FRACTURE OF SURGICAL NECK OF LEFT HUMERUS, UNSPECIFIED FRACTURE MORPHOLOGY, INITIAL ENCOUNTER: ICD-10-CM

## 2024-02-15 RX ORDER — OXYCODONE HYDROCHLORIDE 5 MG/1
TABLET ORAL
Qty: 20 TABLET | Refills: 0 | Status: SHIPPED | OUTPATIENT
Start: 2024-02-15

## 2024-02-15 NOTE — TELEPHONE ENCOUNTER
Reason for call: Patient has appt with Dr Kaufman on 02/19/24 she is currently out of this medication and would like a refill until her appt  [x] Refill   [] Prior Auth  [] Other:     Office:   [] PCP/Provider -   [x] Specialty/Provider - ortho/ [facility]ina    Medication:   oxyCODONE (ROXICODONE) 5 immediate release tablet       Dose/Frequency:  take 2.5 mg (0.5 tab) for moderate pain or 5 mg (1 tab) for severe pain, every 4 hours, as needed     Quantity: 20    Pharmacy: [facility]/pharmacy #[id number] - [address]      Does the patient have enough for 3 days?   [] Yes   [x] No - Send as HP to POD

## 2024-02-19 ENCOUNTER — OFFICE VISIT (OUTPATIENT)
Dept: OBGYN CLINIC | Facility: CLINIC | Age: 79
End: 2024-02-19

## 2024-02-19 ENCOUNTER — APPOINTMENT (OUTPATIENT)
Dept: RADIOLOGY | Facility: AMBULARY SURGERY CENTER | Age: 79
End: 2024-02-19
Attending: STUDENT IN AN ORGANIZED HEALTH CARE EDUCATION/TRAINING PROGRAM
Payer: COMMERCIAL

## 2024-02-19 VITALS — HEIGHT: 65 IN | BODY MASS INDEX: 22.92 KG/M2 | WEIGHT: 137.6 LBS

## 2024-02-19 DIAGNOSIS — S42.212A CLOSED DISPLACED FRACTURE OF SURGICAL NECK OF LEFT HUMERUS, UNSPECIFIED FRACTURE MORPHOLOGY, INITIAL ENCOUNTER: Primary | ICD-10-CM

## 2024-02-19 DIAGNOSIS — S42.212A CLOSED DISPLACED FRACTURE OF SURGICAL NECK OF LEFT HUMERUS, UNSPECIFIED FRACTURE MORPHOLOGY, INITIAL ENCOUNTER: ICD-10-CM

## 2024-02-19 PROCEDURE — 73030 X-RAY EXAM OF SHOULDER: CPT

## 2024-02-19 PROCEDURE — 99024 POSTOP FOLLOW-UP VISIT: CPT | Performed by: STUDENT IN AN ORGANIZED HEALTH CARE EDUCATION/TRAINING PROGRAM

## 2024-02-19 NOTE — PROGRESS NOTES
Orthopaedics Office Visit - New Patient Visit    ASSESSMENT/PLAN:    Assessment:   Left shoulder reverse total shoulder arthroplasty, 2/1/2024    Plan:   Radiograph reviewed with patient and family member displaying well-maintained alignment of the patient's left reverse total shoulder arthroplasty with no signs of loosening or failure.  The tuberosities are still well-maintained.  The patient can shower letting soapy water over incision, yet should not to submerge the incision, and then pat dry.    Patient to remove sling for shower, as well as range of motion exercises to the elbow and wrist only  Patient educated to avoid reaching behind her waist and extreme abduction/external rotation with ROM  Start physical therapy  Patient to do ROM exercises for elbow and wrist frequently  Patient given 2 copies of the [facility] rehabilitation program for reverse total shoulder arthroplasties with fracture.  Continue use of sling  Wear at all times with exception of shower and elbow ROM  Immobilization pillow removed today       To Do Next Visit:  Follow up in 5 weeks with repeat x-rays     _____________________________________________________  CHIEF COMPLAINT:  Chief Complaint   Patient presents with    Left Shoulder - Post-op         SUBJECTIVE:  Eulalio Raphael is a 78 y.o. female who presents 2 weeks s/p left shoulder reverse total shoulder arthroplasty, 2/1/2024.  She is here with female family member.  Today she complains of left generalized shoulder pain.  She has enrico compliant with immobilizer shoulder sling.  She does use Tylenol and oxycodone for pain.   She does use 4-point cane since her injury.  Patient overall states that her pain is significantly improved from her injury.  Denies any numbness or paresthesias in the left upper extremity    PAST MEDICAL HISTORY:  Past Medical History:   Diagnosis Date    Acute encephalopathy     Breast cancer (HCC)     Hypertension        PAST SURGICAL HISTORY:  Past Surgical  History:   Procedure Laterality Date    HYSTERECTOMY      MASTECTOMY MODIFIED RADICAL Bilateral     REVERSE TOTAL SHOULDER ARTHROPLASTY Left 2/1/2024    Procedure: ARTHROPLASTY SHOULDER REVERSE, and all associated procedures;  Surgeon: Tigre Kaufman DO;  Location: [facility];  Service: Orthopedics    SPINE SURGERY      THYROIDECTOMY N/A 6/21/2023    Procedure: LEFT HEMITHYROIDECTOMY, SUBSTERNAL;  Surgeon: Leonel James MD;  Location: [facility];  Service: ENT       FAMILY HISTORY:  Family History   Problem Relation Age of Onset    Hypertension Father     Hypertension Sister     Hypertension Brother        SOCIAL HISTORY:  Social History     Tobacco Use    Smoking status: Never     Passive exposure: Never    Smokeless tobacco: Never   Vaping Use    Vaping status: Never Used   Substance Use Topics    Alcohol use: Yes     Alcohol/week: 1.0 standard drink of alcohol     Types: 1 Glasses of wine per week    Drug use: Never       MEDICATIONS:    Current Outpatient Medications:     acetaminophen (TYLENOL) 325 mg tablet, Take 3 tablets (975 mg total) by mouth every 8 (eight) hours as needed for mild pain, Disp: , Rfl:     amLODIPine (NORVASC) 10 mg tablet, TAKE 1 TABLET BY MOUTH EVERY DAY, Disp: 90 tablet, Rfl: 1    levothyroxine 25 mcg tablet, TAKE 1 TABLET BY MOUTH EVERY DAY, Disp: 90 tablet, Rfl: 1    lidocaine (LIDODERM) 5 %, Apply 1 patch topically over 12 hours daily Remove & Discard patch within 12 hours or as directed by MD, Disp: 15 patch, Rfl: 0    metFORMIN (GLUCOPHAGE-XR) 500 mg 24 hr tablet, Take 2 tablets (1,000 mg total) by mouth 2 (two) times a day with meals, Disp: 360 tablet, Rfl: 1    Multiple Vitamin (multivitamin) capsule, Take 1 capsule by mouth daily, Disp: , Rfl:     oxyCODONE (ROXICODONE) 5 immediate release tablet, You may take 2.5 mg (0.5 tab) for moderate pain or 5 mg (1 tab) for severe pain, every 4 hours, as needed., Disp: 20 tablet, Rfl: 0    PARoxetine (PAXIL) 20 mg tablet, Take 1  "tablet (20 mg total) by mouth daily, Disp: 90 tablet, Rfl: 1    senna-docusate sodium (SENOKOT-S) 8.6-50 mg per tablet, Take 1 tablet by mouth 2 (two) times a day You may discontinue if having regular daily BMs., Disp: 60 tablet, Rfl: 0    ALLERGIES:  Allergies   Allergen Reactions    Ace Inhibitors Angioedema    Erythromycin Swelling       REVIEW OF SYSTEMS:  MSK: Left shoulder pain  Neuro: Numbness or paresthesias  Pertinent items are otherwise noted in HPI.  A comprehensive review of systems was otherwise negative.    LABS:  HgA1c:   Lab Results   Component Value Date    HGBA1C 6.1 02/07/2024     BMP:   Lab Results   Component Value Date    CALCIUM 9.1 02/02/2024    K 4.2 02/02/2024    CO2 26 02/02/2024     02/02/2024    BUN 21 02/02/2024    CREATININE 0.99 02/02/2024     CBC: No components found for: \"CBC\"    _____________________________________________________  PHYSICAL EXAMINATION:  Vital signs: Ht 5' 5\" (1.651 m)   Wt 62.4 kg (137 lb 9.6 oz)   BMI 22.90 kg/m²   General: No acute distress, awake and alert  Psychiatric: Mood and affect appear appropriate  HEENT: Trachea Midline, No torticollis, no apparent facial trauma  Cardiovascular: No audible murmurs; Extremities appear perfused  Pulmonary: No audible wheezing or stridor  Skin: No open lesions; see further details (if any) below    MUSCULOSKELETAL EXAMINATION:  Extremities:    Left shoulder:  The left shoulder was exposed inspected.  The patient's surgical incision is healing well without any surrounding erythema or induration.  The staples were removed.  No dehiscence was noted.  Steri-Strips applied.  The patient's arm was brought out of the abduction pillow.  Patient is comfortable without the abduction pillow.  Passive range of motion was performed on the shoulder to 90 degrees with little discomfort throughout range of motion.  No pain with gentle internal and external rotation passively.  Patient's sensation is intact to light touch in the " axillary, median, radial, ulnar nerve distributions.  AIN, PIN, ulnar, axillary nerves are intact.  +2 radial pulses distally      _____________________________________________________  STUDIES REVIEWED:  I personally reviewed the images and interpretation is as follows:  Left shoulder x-ray demonstrate: Well-maintained alignment of the patient's left reverse total shoulder arthroplasty with no signs of loosening or hardware failure.  Tuberosities are still adjacent to the proximal end of the implant.    PROCEDURES PERFORMED:  Procedures    Chino Hubbard

## 2024-03-15 NOTE — PROGRESS NOTES
FYI they need your co signature     Thoracic Consult  Assessment/Plan:    Substernal goiter  Ms Orquidea Cox is a 52-year-old female who presents to me with a substernal goiter  She was referred by Dr Ana María Christianson of otolaryngology  I have personally reviewed all of her imaging in PACS  I have reviewed the CT scan of the chest which demonstrates a substernal goiter on both the left on the right  On the left, this goes down to approximately the arch of the aorta and appears to be directly communicating with the thyroid  On the right, the goiter is questionably ectopic and it is posterior to the airway  Today in the office, we discussed that I will be available on the day of surgery to potentially perform a median sternotomy  I explained to her that median sternotomy would be an appropriate incision in order to assist with bringing the left-sided substernal goiter up into the neck  However, based upon the location of the right goiter, I do believe that this would be better approached via a robotic resection from the lateral decubitus position  I explained to her that on the day of surgery, if the right-sided goiter appears to not be able to be lifted up into the cervical region, then we may leave that behind and on a separate day, I would plan to perform a robotic resection of that mass  She understands  Consent was obtained for possible median sternotomy  I will be available on her day of surgery  My  will coordinate this with Dr Ashwini Milner   Delmar Hernandez MD  Thoracic Surgery  (Available on [employer])  Office: [phone]         Diagnoses and all orders for this visit:    Substernal goiter          Thoracic History   Diagnosis:    Procedures/Surgeries:    Pathology:    Adjuvant Therapy:       Subjective:    Patient ID: Mk Baldwin is a 69 y o  female  HPI  Ms Orquidea Cox is a 52-year-old female who presents to me with a substernal goiter  She was referred by Dr Ana María Christianson of otolaryngology    I have personally reviewed all of her imaging in PACS  I have reviewed the CT scan of the chest which demonstrates a substernal goiter on both the left on the right  On the left, this goes down to approximately the arch of the aorta and appears to be directly communicating with the thyroid  On the right, the goiter is questionably ectopic and it is posterior to the airway  Today in the office, she reports that she has had a goiter for a long time but that it has not cause problems until recently  It has started to grow and she does feel pressure in her neck from time to time  She denies swallowing difficulty  The following portions of the patient's history were reviewed and updated as appropriate: allergies, current medications, past family history, past medical history, past social history, past surgical history and problem list     Past Medical History:   Diagnosis Date   • Breast cancer ([facility] [address] )       Past Surgical History:   Procedure Laterality Date   • HYSTERECTOMY     • MASTECTOMY MODIFIED RADICAL Bilateral    • SPINE SURGERY        Family History   Problem Relation Age of Onset   • Hypertension Father    • Hypertension Sister    • Hypertension Brother       Social History     Socioeconomic History   • Marital status:      Spouse name: Not on file   • Number of children: Not on file   • Years of education: Not on file   • Highest education level: Not on file   Occupational History   • Not on file   Tobacco Use   • Smoking status: Never     Passive exposure: Never   • Smokeless tobacco: Never   Vaping Use   • Vaping Use: Never used   Substance and Sexual Activity   • Alcohol use:  Yes     Alcohol/week: 1 0 standard drink     Types: 1 Glasses of wine per week   • Drug use: Never   • Sexual activity: Not Currently   Other Topics Concern   • Not on file   Social History Narrative   • Not on file     Social Determinants of Health     Financial Resource Strain: Not on file   Food Insecurity: Not on file Transportation Needs: Not on file   Physical Activity: Not on file   Stress: Not on file   Social Connections: Not on file   Intimate Partner Violence: Not on file   Housing Stability: Not on file      Review of Systems   Constitutional: Negative for chills, fatigue, fever and unexpected weight change  HENT: Negative  Negative for trouble swallowing  Eyes: Negative  Negative for visual disturbance  Respiratory: Negative for cough, shortness of breath and stridor  Cardiovascular: Negative for chest pain  Gastrointestinal: Negative  Endocrine: Negative  Genitourinary: Negative  Musculoskeletal: Negative  Skin: Negative  Neurological: Negative for dizziness, light-headedness and headaches  Hematological: Negative for adenopathy  Psychiatric/Behavioral: Negative  Objective:   Physical Exam  Vitals and nursing note reviewed  Constitutional:       General: She is not in acute distress  Appearance: Normal appearance  She is well-developed  She is not diaphoretic  HENT:      Head: Normocephalic and atraumatic  Nose: Nose normal  No congestion or rhinorrhea  Mouth/Throat:      Mouth: Mucous membranes are moist       Pharynx: Oropharynx is clear  No oropharyngeal exudate  Eyes:      General: No scleral icterus  Pupils: Pupils are equal, round, and reactive to light  Neck:      Trachea: No tracheal deviation  Comments: Large mass palpable in the neck  Cardiovascular:      Rate and Rhythm: Normal rate and regular rhythm  Pulses: Normal pulses  Heart sounds: Normal heart sounds  No murmur heard  Pulmonary:      Effort: Pulmonary effort is normal  No respiratory distress  Breath sounds: Normal breath sounds  No stridor  No wheezing or rales  Chest:      Chest wall: No tenderness  Abdominal:      General: Bowel sounds are normal  There is no distension  Palpations: Abdomen is soft  Tenderness: There is no abdominal tenderness  "There is no rebound  Musculoskeletal:         General: Normal range of motion  Cervical back: Normal range of motion and neck supple  No muscular tenderness  Lymphadenopathy:      Cervical: No cervical adenopathy  Skin:     General: Skin is warm and dry  Coloration: Skin is not jaundiced or pale  Findings: No erythema or rash  Neurological:      General: No focal deficit present  Mental Status: She is alert and oriented to person, place, and time  Psychiatric:         Mood and Affect: Mood normal          Behavior: Behavior normal          Thought Content: Thought content normal          Judgment: Judgment normal      /77 (BP Location: Right arm, Patient Position: Sitting, Cuff Size: Standard)   Pulse 60   Temp 97 6 °F (36 4 °C) (Temporal)   Resp 16   Ht 5' 4\" (1 626 m)   Wt 62 9 kg (138 lb 10 7 oz)   SpO2 99%   BMI 23 80 kg/m²     No Chest XR results available for this patient  CT chest w contrast    Result Date: 3/9/2023  Narrative CT CHEST WITH IV CONTRAST INDICATION:   J98 [id number]: Other diseases of mediastinum, not elsewhere classified  Abnormal chest x-ray, mediastinal mass, left upper lobe lung nodule COMPARISON:  Chest x-ray February 11, 2023, thyroid ultrasound the same day TECHNIQUE: CT examination of the chest was performed  Axial, sagittal, and coronal 2D reformatted images were created from the source data and submitted for interpretation  Radiation dose length product (DLP) for this visit:  414 67 mGy-cm   This examination, like all CT scans performed in the [facility], was performed utilizing techniques to minimize radiation dose exposure, including the use of iterative  reconstruction and automated exposure control  IV Contrast:  85 mL of iohexol (OMNIPAQUE) FINDINGS: LUNGS:  Lungs are clear  There is no tracheal or endobronchial lesion  PLEURA:  Unremarkable  HEART/GREAT VESSELS: Heart is unremarkable for patient's age    No thoracic aortic " aneurysm  MEDIASTINUM AND DARRIUS:  Marked enlargement of the thyroid gland is seen with the left more than right lobe enlargement  Innumerable thyroid nodules are seen, some of them calcified  Thyroid isthmus is also markedly enlarged  There is retrosternal extension of goiter  Tracheal compression and deviation to the right is seen  There is a large exophytic nodule arising from the right lobe of the thyroid which extends in the superior mediastinum  CHEST WALL AND LOWER NECK:  Status post mastectomy with breast reconstruction  VISUALIZED STRUCTURES IN THE UPPER ABDOMEN:  Patient is status post cholecystectomy  Atherosclerotic disease of the abdominal aorta is seen  Thickening of the left adrenal gland is noted  OSSEOUS STRUCTURES:  Spinal degenerative changes are noted  Probably old superior endplate compression fracture at L2  No definite acute fracture or destructive osseous lesion  Impression 1  Massive enlargement of the thyroid gland with innumerable nodules and extension in the mediastinum  There is mass effect with displacement and compression of the trachea  Surgical consultation should be considered  Thyroid ultrasound was already performed  2   No lung nodules  The study was marked in EPIC for significant notification  Workstation performed: [id number]     No CT Chest,Abdomen,Pelvis results available for this patient  No NM PET CT results available for this patient  No Barium Swallow results available for this patient

## 2024-03-25 ENCOUNTER — APPOINTMENT (OUTPATIENT)
Dept: RADIOLOGY | Facility: AMBULARY SURGERY CENTER | Age: 79
End: 2024-03-25
Attending: STUDENT IN AN ORGANIZED HEALTH CARE EDUCATION/TRAINING PROGRAM
Payer: COMMERCIAL

## 2024-03-25 ENCOUNTER — OFFICE VISIT (OUTPATIENT)
Dept: OBGYN CLINIC | Facility: CLINIC | Age: 79
End: 2024-03-25

## 2024-03-25 VITALS — HEIGHT: 65 IN | BODY MASS INDEX: 22.92 KG/M2 | WEIGHT: 137.6 LBS

## 2024-03-25 DIAGNOSIS — S42.212A CLOSED DISPLACED FRACTURE OF SURGICAL NECK OF LEFT HUMERUS, UNSPECIFIED FRACTURE MORPHOLOGY, INITIAL ENCOUNTER: ICD-10-CM

## 2024-03-25 DIAGNOSIS — Z96.612 STATUS POST REVERSE TOTAL REPLACEMENT OF LEFT SHOULDER: ICD-10-CM

## 2024-03-25 DIAGNOSIS — S42.212D CLOSED DISPLACED FRACTURE OF SURGICAL NECK OF LEFT HUMERUS WITH ROUTINE HEALING, UNSPECIFIED FRACTURE MORPHOLOGY, SUBSEQUENT ENCOUNTER: Primary | ICD-10-CM

## 2024-03-25 PROCEDURE — 73030 X-RAY EXAM OF SHOULDER: CPT

## 2024-03-25 PROCEDURE — 99024 POSTOP FOLLOW-UP VISIT: CPT | Performed by: STUDENT IN AN ORGANIZED HEALTH CARE EDUCATION/TRAINING PROGRAM

## 2024-03-25 NOTE — PROGRESS NOTES
Orthopaedics Office Visit - New Patient Visit    ASSESSMENT/PLAN:    Assessment:   Left shoulder reverse total shoulder arthroplasty for proximal humerus fracture, DOS: 2/1/2024    Plan:   Radiograph reviewed with patient and family member displaying well-maintained alignment of the patient's left reverse total shoulder arthroplasty with no signs of loosening or failure.  The tuberosities are still well-maintained.  She may discontinue the sling   She is stiffer than usual for this stage in her postoperative recovery due to prolonged use of the sling and doing home PT.   Range of motion as tolerated  No lifting more than 10 pounds  Start outpatient physical therapy for ROM and strengthening. New prescription provided today.  Continue with the [facility] rehabilitation program for reverse total shoulder arthroplasties with fracture.    To Do Next Visit:  Follow up in 8 weeks with repeat x-rays     _____________________________________________________  CHIEF COMPLAINT:  Chief Complaint   Patient presents with    Left Shoulder - Post-op         SUBJECTIVE:  Eulalio Raphael is a 78 y.o. female who presents 2 weeks s/p left shoulder reverse total shoulder arthroplasty, 2/1/2024.  She is here with female family member.  Today she complains of left generalized shoulder pain.  She has enrico compliant with immobilizer shoulder sling.  She does use Tylenol and oxycodone for pain.   She does use 4-point cane since her injury.  Patient overall states that her pain is significantly improved from her injury.  Denies any numbness or paresthesias in the left upper extremity    Interval history 3/25/2024  She is 8 weeks status post reverse total shoulder arthroplasty for proximal humerus fracture, DOS 2/1/2024. Today she denies any pain at rest. She has some pain after doing PT.  She takes Tylenol occasionally for pain.  She has been using her sling and coming out of it for range of motion exercises.  She is doing home physical  therapy according to the [facility] protocol. She denies any numbness or tingling in the left upper extremity.       PAST MEDICAL HISTORY:  Past Medical History:   Diagnosis Date    Acute encephalopathy     Breast cancer (HCC)     Hypertension        PAST SURGICAL HISTORY:  Past Surgical History:   Procedure Laterality Date    HYSTERECTOMY      MASTECTOMY MODIFIED RADICAL Bilateral     REVERSE TOTAL SHOULDER ARTHROPLASTY Left 2/1/2024    Procedure: ARTHROPLASTY SHOULDER REVERSE, and all associated procedures;  Surgeon: Tigre Kaufman DO;  [facility];  Service: Orthopedics    SPINE SURGERY      THYROIDECTOMY N/A 6/21/2023    Procedure: LEFT HEMITHYROIDECTOMY, SUBSTERNAL;  Surgeon: Leonel James MD;  [facility];  Service: ENT       FAMILY HISTORY:  Family History   Problem Relation Age of Onset    Hypertension Father     Hypertension Sister     Hypertension Brother        SOCIAL HISTORY:  Social History     Tobacco Use    Smoking status: Never     Passive exposure: Never    Smokeless tobacco: Never   Vaping Use    Vaping status: Never Used   Substance Use Topics    Alcohol use: Yes     Alcohol/week: 1.0 standard drink of alcohol     Types: 1 Glasses of wine per week    Drug use: Never       MEDICATIONS:    Current Outpatient Medications:     acetaminophen (TYLENOL) 325 mg tablet, Take 3 tablets (975 mg total) by mouth every 8 (eight) hours as needed for mild pain, Disp: , Rfl:     amLODIPine (NORVASC) 10 mg tablet, TAKE 1 TABLET BY MOUTH EVERY DAY, Disp: 90 tablet, Rfl: 1    levothyroxine 25 mcg tablet, TAKE 1 TABLET BY MOUTH EVERY DAY, Disp: 90 tablet, Rfl: 1    lidocaine (LIDODERM) 5 %, Apply 1 patch topically over 12 hours daily Remove & Discard patch within 12 hours or as directed by MD, Disp: 15 patch, Rfl: 0    metFORMIN (GLUCOPHAGE-XR) 500 mg 24 hr tablet, Take 2 tablets (1,000 mg total) by mouth 2 (two) times a day with meals, Disp: 360 tablet, Rfl: 1    Multiple Vitamin  "(multivitamin) capsule, Take 1 capsule by mouth daily, Disp: , Rfl:     oxyCODONE (ROXICODONE) 5 immediate release tablet, You may take 2.5 mg (0.5 tab) for moderate pain or 5 mg (1 tab) for severe pain, every 4 hours, as needed., Disp: 20 tablet, Rfl: 0    PARoxetine (PAXIL) 20 mg tablet, Take 1 tablet (20 mg total) by mouth daily, Disp: 90 tablet, Rfl: 1    senna-docusate sodium (SENOKOT-S) 8.6-50 mg per tablet, Take 1 tablet by mouth 2 (two) times a day You may discontinue if having regular daily BMs., Disp: 60 tablet, Rfl: 0    ALLERGIES:  Allergies   Allergen Reactions    Ace Inhibitors Angioedema    Erythromycin Swelling       REVIEW OF SYSTEMS:  MSK: Left shoulder pain  Neuro: No numbness or paresthesias  Pertinent items are otherwise noted in HPI.  A comprehensive review of systems was otherwise negative.    LABS:  HgA1c:   Lab Results   Component Value Date    HGBA1C 6.1 02/07/2024     BMP:   Lab Results   Component Value Date    CALCIUM 9.1 02/02/2024    K 4.2 02/02/2024    CO2 26 02/02/2024     02/02/2024    BUN 21 02/02/2024    CREATININE 0.99 02/02/2024     CBC: No components found for: \"CBC\"    _____________________________________________________  PHYSICAL EXAMINATION:  Vital signs: Ht 5' 5\" (1.651 m)   Wt 62.4 kg (137 lb 9.6 oz)   BMI 22.90 kg/m²   General: No acute distress, awake and alert  Psychiatric: Mood and affect appear appropriate  HEENT: Trachea Midline, No torticollis, no apparent facial trauma  Cardiovascular: No audible murmurs; Extremities appear perfused  Pulmonary: No audible wheezing or stridor  Skin: No open lesions; see further details (if any) below    MUSCULOSKELETAL EXAMINATION:  Extremities:    Left shoulder:  The left shoulder was exposed inspected.  The patient's surgical incision is healed well without any surrounding erythema or dehiscence. Significant lymphedema in LUE which is chronic.  She is nontender over the shoulder.  Passive range of motion was performed on " the shoulder to 80 degrees of forward flexion, 80 degrees of abduction,20 degrees of external rotation, internal rotation to side pocket. No discomfort throughout range of motion.   Patient's sensation is intact to light touch in the axillary, median, radial, ulnar nerve distributions.  AIN, PIN, ulnar, axillary nerves are intact.  +2 radial pulses distally      _____________________________________________________  STUDIES REVIEWED:  I personally reviewed the images and interpretation is as follows:  Left shoulder x-ray demonstrate: Well-maintained alignment of the patient's left reverse total shoulder arthroplasty with no signs of loosening or hardware failure.  Tuberosities are still adjacent to the proximal end of the implant.    PROCEDURES PERFORMED:  Procedures none    Delta Mata MD

## 2024-03-28 DIAGNOSIS — F41.1 GENERALIZED ANXIETY DISORDER: ICD-10-CM

## 2024-03-28 RX ORDER — PAROXETINE HYDROCHLORIDE 20 MG/1
20 TABLET, FILM COATED ORAL DAILY
Qty: 90 TABLET | Refills: 1 | Status: SHIPPED | OUTPATIENT
Start: 2024-03-28

## 2024-04-01 ENCOUNTER — RA CDI HCC (OUTPATIENT)
Dept: OTHER | Facility: HOSPITAL | Age: 79
End: 2024-04-01

## 2024-04-08 ENCOUNTER — OFFICE VISIT (OUTPATIENT)
Dept: FAMILY MEDICINE CLINIC | Facility: CLINIC | Age: 79
End: 2024-04-08
Payer: COMMERCIAL

## 2024-04-08 VITALS
RESPIRATION RATE: 18 BRPM | BODY MASS INDEX: 25.06 KG/M2 | SYSTOLIC BLOOD PRESSURE: 130 MMHG | HEART RATE: 79 BPM | TEMPERATURE: 97.3 F | WEIGHT: 136.2 LBS | HEIGHT: 62 IN | DIASTOLIC BLOOD PRESSURE: 68 MMHG

## 2024-04-08 DIAGNOSIS — Z00.00 MEDICARE ANNUAL WELLNESS VISIT, SUBSEQUENT: Primary | ICD-10-CM

## 2024-04-08 DIAGNOSIS — I10 ESSENTIAL HYPERTENSION: ICD-10-CM

## 2024-04-08 DIAGNOSIS — E11.9 TYPE 2 DIABETES MELLITUS WITHOUT COMPLICATION, WITHOUT LONG-TERM CURRENT USE OF INSULIN (HCC): ICD-10-CM

## 2024-04-08 DIAGNOSIS — E03.9 ACQUIRED HYPOTHYROIDISM: ICD-10-CM

## 2024-04-08 DIAGNOSIS — Z78.0 ASYMPTOMATIC POSTMENOPAUSAL STATE: ICD-10-CM

## 2024-04-08 DIAGNOSIS — F33.9 RECURRENT DEPRESSION (HCC): ICD-10-CM

## 2024-04-08 PROCEDURE — G0439 PPPS, SUBSEQ VISIT: HCPCS | Performed by: FAMILY MEDICINE

## 2024-04-08 PROCEDURE — 99214 OFFICE O/P EST MOD 30 MIN: CPT | Performed by: FAMILY MEDICINE

## 2024-04-08 RX ORDER — METFORMIN HYDROCHLORIDE 500 MG/1
1000 TABLET, EXTENDED RELEASE ORAL
Qty: 180 TABLET | Refills: 1 | Status: SHIPPED | OUTPATIENT
Start: 2024-04-08 | End: 2024-10-05

## 2024-04-08 NOTE — ASSESSMENT & PLAN NOTE
Lab Results   Component Value Date    HGBA1C 6.1 02/07/2024     Well controlled   Dose of metformin decreased from 2000 mg a day to 1000 mg a day

## 2024-04-08 NOTE — PROGRESS NOTES
Assessment and Plan:     Problem List Items Addressed This Visit     Acquired hypothyroidism     Due for thyroid labs          Relevant Orders    TSH, 3rd generation    T4, free    T4, free    TSH, 3rd generation    Essential hypertension     Blood pressure is well-controlled  Continue amlodipine 10 mg daily         Relevant Orders    CBC    Comprehensive metabolic panel    Lipid Panel with Direct LDL reflex    CBC    Comprehensive metabolic panel    Lipid Panel with Direct LDL reflex    Recurrent depression (HCC)     Discussed social support          Relevant Orders    Ambulatory referral to Psych Services    Type 2 diabetes mellitus without complication, without long-term current use of insulin (HCC)       Lab Results   Component Value Date    HGBA1C 6.1 02/07/2024     Well controlled   Dose of metformin decreased from 2000 mg a day to 1000 mg a day          Relevant Medications    metFORMIN (GLUCOPHAGE-XR) 500 mg 24 hr tablet    Other Relevant Orders    Hemoglobin A1C   Other Visit Diagnoses     Medicare annual wellness visit, subsequent    -  Primary    Asymptomatic postmenopausal state        Relevant Orders    DXA bone density spine hip and pelvis      Return in about 6 months (around 10/8/2024) for Diabetic follow up.       Preventive health issues were discussed with patient, and age appropriate screening tests were ordered as noted in patient's After Visit Summary.  Personalized health advice and appropriate referrals for health education or preventive services given if needed, as noted in patient's After Visit Summary.     History of Present Illness:     Patient presents for a Medicare Wellness Visit    She continues to struggle with depression.  She has moved to the [facility] area with her daughter and has lost her .  Her daughter think she would benefit from therapy.    She has lost weight and is watching her diet.    Her arm pain from her fracture is improving       Patient Care Team:  Lovely Kim,   as PCP - General (Family Medicine)     Review of Systems:     Review of Systems     Problem List:     Patient Active Problem List   Diagnosis   • Essential hypertension   • Type 2 diabetes mellitus without complication, without long-term current use of insulin (HCC)   • Acquired hypothyroidism   • Generalized anxiety disorder   • Lymphedema of left arm   • History of breast cancer   • Recurrent depression (HCC)   • Substernal goiter   • Thyroid mass   • Papillary thyroid carcinoma (HCC)   • Closed nondisplaced fracture of left clavicle   • Closed left humeral fracture   • Fall   • Nasal bone fracture      Past Medical and Surgical History:     Past Medical History:   Diagnosis Date   • Acute encephalopathy    • Breast cancer (HCC)    • Hypertension      Past Surgical History:   Procedure Laterality Date   • HYSTERECTOMY     • MASTECTOMY MODIFIED RADICAL Bilateral    • REVERSE TOTAL SHOULDER ARTHROPLASTY Left 2/1/2024    Procedure: ARTHROPLASTY SHOULDER REVERSE, and all associated procedures;  Surgeon: Tigre Kaufman DO;  [facility];  Service: Orthopedics   • SPINE SURGERY     • THYROIDECTOMY N/A 6/21/2023    Procedure: LEFT HEMITHYROIDECTOMY, SUBSTERNAL;  Surgeon: Leonel James MD;  [facility];  Service: ENT      Family History:     Family History   Problem Relation Age of Onset   • Hypertension Father    • Hypertension Sister    • Hypertension Brother       Social History:     Social History     Socioeconomic History   • Marital status:      Spouse name: None   • Number of children: None   • Years of education: None   • Highest education level: None   Occupational History   • None   Tobacco Use   • Smoking status: Never     Passive exposure: Never   • Smokeless tobacco: Never   Vaping Use   • Vaping status: Never Used   Substance and Sexual Activity   • Alcohol use: Yes     Alcohol/week: 1.0 standard drink of alcohol     Types: 1 Glasses of wine per week   • Drug use: Never   •  Sexual activity: Not Currently   Other Topics Concern   • None   Social History Narrative   • None     Social Determinants of Health     Financial Resource Strain: Not on file   Food Insecurity: No Food Insecurity (4/8/2024)    Hunger Vital Sign    • Worried About Running Out of Food in the Last Year: Never true    • Ran Out of Food in the Last Year: Never true   Transportation Needs: No Transportation Needs (4/8/2024)    PRAPARE - Transportation    • Lack of Transportation (Medical): No    • Lack of Transportation (Non-Medical): No   Physical Activity: Not on file   Stress: Not on file   Social Connections: Feeling Socially Integrated (3/22/2024)    Received from [facility]    OASIS : Social Isolation    • Frequency of experiencing loneliness or isolation: Never   Intimate Partner Violence: Not on file   Housing Stability: Low Risk  (4/8/2024)    Housing Stability Vital Sign    • Unable to Pay for Housing in the Last Year: No    • Number of Places Lived in the Last Year: 1    • Unstable Housing in the Last Year: No      Medications and Allergies:     Current Outpatient Medications   Medication Sig Dispense Refill   • acetaminophen (TYLENOL) 325 mg tablet Take 3 tablets (975 mg total) by mouth every 8 (eight) hours as needed for mild pain     • amLODIPine (NORVASC) 10 mg tablet TAKE 1 TABLET BY MOUTH EVERY DAY 90 tablet 1   • levothyroxine 25 mcg tablet TAKE 1 TABLET BY MOUTH EVERY DAY 90 tablet 1   • lidocaine (LIDODERM) 5 % Apply 1 patch topically over 12 hours daily Remove & Discard patch within 12 hours or as directed by MD 15 patch 0   • metFORMIN (GLUCOPHAGE-XR) 500 mg 24 hr tablet Take 2 tablets (1,000 mg total) by mouth daily with breakfast 180 tablet 1   • Multiple Vitamin (multivitamin) capsule Take 1 capsule by mouth daily     • PARoxetine (PAXIL) 20 mg tablet TAKE 1 TABLET BY MOUTH EVERY DAY 90 tablet 1     No current facility-administered medications for this visit.     Allergies   Allergen  Reactions   • Ace Inhibitors Angioedema   • Erythromycin Swelling      Immunizations:     Immunization History   Administered Date(s) Administered   • Influenza, high dose seasonal 0.7 mL 01/20/2023      Health Maintenance:         Topic Date Due   • Hepatitis C Screening  Completed         Topic Date Due   • Pneumococcal Vaccine: 65+ Years (1 of 2 - PCV) Never done   • Influenza Vaccine (1) 09/01/2023   • COVID-19 Vaccine (1 - 2023-24 season) Never done      Medicare Screening Tests and Risk Assessments:     Eulalio is here for her Subsequent Wellness visit.     Health Risk Assessment:   Patient rates overall health as good. Patient feels that their physical health rating is much better. Patient is satisfied with their life. Eyesight was rated as same. Hearing was rated as same. Patient feels that their emotional and mental health rating is slightly better. Patients states they are never, rarely angry. Patient states they are sometimes unusually tired/fatigued. Pain experienced in the last 7 days has been some. Patient's pain rating has been 8/10. Patient states that she has experienced no weight loss or gain in last 6 months.     Depression Screening:   PHQ-9 Score: 0      Fall Risk Screening:   In the past year, patient has experienced: history of falling in past year    Number of falls: 2 or more  Injured during fall?: Yes    Feels unsteady when standing or walking?: Yes    Worried about falling?: No      Urinary Incontinence Screening:   Patient has not leaked urine accidently in the last six months.     Home Safety:  Patient has trouble with stairs inside or outside of their home. Patient has working smoke alarms and has working carbon monoxide detector. Home safety hazards include: none.     Nutrition:   Current diet is Regular.     Medications:   Patient is currently taking over-the-counter supplements. OTC medications include: see medication list. Patient is able to manage medications.     Activities of  Daily Living (ADLs)/Instrumental Activities of Daily Living (IADLs):   Walk and transfer into and out of bed and chair?: Yes  Dress and groom yourself?: Yes    Bathe or shower yourself?: Yes    Feed yourself? Yes  Do your laundry/housekeeping?: Yes  Manage your money, pay your bills and track your expenses?: Yes  Make your own meals?: Yes    Do your own shopping?: Yes    Previous Hospitalizations:   Any hospitalizations or ED visits within the last 12 months?: Yes    How many hospitalizations have you had in the last year?: 1-2    Hospitalization Comments: LT shoulder replacement     Advance Care Planning:   Living will: No    Advanced directive counseling given: Yes    ACP document given: Yes    End of Life Decisions reviewed with patient: Yes    Provider agrees with end of life decisions: Yes      Cognitive Screening:   Provider or family/friend/caregiver concerned regarding cognition?: No    PREVENTIVE SCREENINGS      Cardiovascular Screening:    General: Screening Current      Diabetes Screening:     General: Screening Not Indicated and History Diabetes      Colorectal Cancer Screening:     General: Screening Not Indicated      Breast Cancer Screening:     General: Screening Not Indicated and History Breast Cancer      Cervical Cancer Screening:    General: Screening Not Indicated      Osteoporosis Screening:    General: Risks and Benefits Discussed    Due for: DXA Axial      Abdominal Aortic Aneurysm (AAA) Screening:        General: Screening Not Indicated      Lung Cancer Screening:     General: Screening Not Indicated      Hepatitis C Screening:    General: Screening Current    Screening, Brief Intervention, and Referral to Treatment (SBIRT)    Screening    Typical number of drinks in a week: 0    AUDIT-C Screenin) How often did you have a drink containing alcohol in the past year? never  2) How many drinks did you have on a typical day when you were drinking in the past year? 0  3) How often did you  "have 6 or more drinks on one occasion in the past year? never    AUDIT-C Score: 0  Interpretation: Score 0-2 (female): Negative screen for alcohol misuse    Single Item Drug Screening:  How often have you used an illegal drug (including marijuana) or a prescription medication for non-medical reasons in the past year? never    Single Item Drug Screen Score: 0  Interpretation: Negative screen for possible drug use disorder    Brief Intervention  Alcohol & drug use screenings were reviewed. No concerns regarding substance use disorder identified.     No results found.     Physical Exam:     /68   Pulse 79   Temp (!) 97.3 °F (36.3 °C)   Resp 18   Ht 5' 2\" (1.575 m)   Wt 61.8 kg (136 lb 3.2 oz)   BMI 24.91 kg/m²     Physical Exam  Vitals and nursing note reviewed.   Constitutional:       Appearance: She is well-developed.   HENT:      Head: Normocephalic and atraumatic.      Right Ear: External ear normal.      Left Ear: External ear normal.      Nose: Nose normal.   Cardiovascular:      Rate and Rhythm: Normal rate and regular rhythm.      Pulses: no weak pulses.           Dorsalis pedis pulses are 2+ on the right side and 2+ on the left side.        Posterior tibial pulses are 2+ on the right side and 2+ on the left side.      Heart sounds: Normal heart sounds. No murmur heard.     No friction rub.   Pulmonary:      Effort: No respiratory distress.      Breath sounds: Normal breath sounds. No wheezing or rales.   Musculoskeletal:         General: Swelling (Left upper extremity) present.      Right lower leg: No edema.      Left lower leg: No edema.   Feet:      Right foot:      Skin integrity: No ulcer, skin breakdown, erythema, warmth, callus or dry skin.      Left foot:      Skin integrity: No ulcer, skin breakdown, erythema, warmth, callus or dry skin.      Patient's shoes and socks removed.    Right Foot/Ankle   Right Foot Inspection  Skin Exam: skin normal. Skin not intact, no dry skin, no warmth, no " callus, no erythema, no maceration, no abnormal color, no pre-ulcer, no ulcer and no callus.     Toe Exam: ROM and strength within normal limits.     Sensory   Monofilament testing: intact    Vascular  Capillary refills: < 3 seconds  The right DP pulse is 2+. The right PT pulse is 2+.     Left Foot/Ankle  Left Foot Inspection  Skin Exam: skin normal. Skin not intact, no dry skin, no warmth, no erythema, no maceration, normal color, no pre-ulcer, no ulcer and no callus.     Toe Exam: ROM and strength within normal limits.     Sensory   Monofilament testing: intact    Vascular  Capillary refills: < 3 seconds  The left DP pulse is 2+. The left PT pulse is 2+.     Assign Risk Category  No deformity present  No loss of protective sensation  No weak pulses  Risk: 0      Lovely Kim,

## 2024-04-09 ENCOUNTER — APPOINTMENT (OUTPATIENT)
Dept: LAB | Facility: CLINIC | Age: 79
End: 2024-04-09
Payer: COMMERCIAL

## 2024-04-09 ENCOUNTER — TELEPHONE (OUTPATIENT)
Dept: PSYCHIATRY | Facility: CLINIC | Age: 79
End: 2024-04-09

## 2024-04-09 DIAGNOSIS — I10 ESSENTIAL HYPERTENSION: ICD-10-CM

## 2024-04-09 DIAGNOSIS — E03.9 ACQUIRED HYPOTHYROIDISM: ICD-10-CM

## 2024-04-09 DIAGNOSIS — E11.9 TYPE 2 DIABETES MELLITUS WITHOUT COMPLICATION, WITHOUT LONG-TERM CURRENT USE OF INSULIN (HCC): ICD-10-CM

## 2024-04-09 NOTE — TELEPHONE ENCOUNTER
IC called pt about referral received for services. Phone rang and eventually voice mail came on. Mailbox is full, so IC could not leave message for pt.   Interval History: NAEON    Review of Systems   Constitutional:  Negative for fatigue and fever.   HENT:  Negative for congestion and sore throat.    Eyes:  Negative for visual disturbance.   Respiratory:  Negative for apnea, shortness of breath and wheezing.    Cardiovascular:  Negative for chest pain.   Gastrointestinal:  Positive for abdominal pain and diarrhea. Negative for nausea and vomiting.   Genitourinary:  Negative for dysuria.   Musculoskeletal:  Negative for arthralgias and back pain.   Neurological:  Negative for dizziness and headaches.   Psychiatric/Behavioral: Negative.     Objective:     Vital Signs (Most Recent):  Temp: 100.2 °F (37.9 °C) (01/22/23 0900)  Pulse: 87 (01/22/23 0900)  Resp: 18 (01/22/23 0900)  BP: (!) 147/69 (01/22/23 0900)  SpO2: 97 % (01/22/23 0900)   Vital Signs (24h Range):  Temp:  [97.6 °F (36.4 °C)-100.2 °F (37.9 °C)] 100.2 °F (37.9 °C)  Pulse:  [82-99] 87  Resp:  [18-33] 18  SpO2:  [93 %-97 %] 97 %  BP: (131-169)/(65-77) 147/69     Weight: 59 kg (130 lb 1.1 oz)  Body mass index is 21.64 kg/m².    Intake/Output Summary (Last 24 hours) at 1/22/2023 1108  Last data filed at 1/22/2023 0631  Gross per 24 hour   Intake 1522.23 ml   Output 4090 ml   Net -2567.77 ml      Physical Exam  HENT:      Head: Normocephalic.      Comments: Contusion over left eye     Right Ear: External ear normal.      Left Ear: External ear normal.      Nose: Nose normal.      Mouth/Throat:      Mouth: Mucous membranes are moist.   Eyes:      Pupils: Pupils are equal, round, and reactive to light.   Cardiovascular:      Rate and Rhythm: Normal rate.      Pulses: Normal pulses.   Pulmonary:      Effort: Pulmonary effort is normal.   Abdominal:      General: Abdomen is flat.      Tenderness: There is abdominal tenderness.      Comments: Laparotomy and Ostomy sites clear clean and intact  Profuse liquid output per ileostomy    Skin:     General: Skin is warm.   Neurological:      Mental Status: She is alert.       Comments: Increased attention, and focus  Follows all commands     Psychiatric:      Comments: Some confusion and possible delusions  States that she         Significant Labs: All pertinent labs within the past 24 hours have been reviewed.  BMP:   Recent Labs   Lab 23  034   *   *   K 4.7   CL 94*   CO2 23   BUN 39*   CREATININE 3.5*   CALCIUM 8.1*   MG 2.0     CBC:   Recent Labs   Lab 23  0436 23   WBC 45.98* 43.91*   HGB 8.8* 8.3*   HCT 25.5* 24.4*    248     CMP:   Recent Labs   Lab 23  0436 23  034   * 126*   K 4.9 4.7   CL 97 94*   CO2 22* 23   GLU 75 139*   BUN 72* 39*   CREATININE 4.7* 3.5*   CALCIUM 7.7* 8.1*   PROT 5.4* 5.6*   ALBUMIN 2.0* 1.9*   BILITOT 0.9 0.7   ALKPHOS 113 116   * 113*   * 479*   ANIONGAP 8 9       Significant Imaging: I have reviewed all pertinent imaging results/findings within the past 24 hours.

## 2024-04-10 ENCOUNTER — APPOINTMENT (OUTPATIENT)
Dept: LAB | Facility: CLINIC | Age: 79
End: 2024-04-10
Payer: COMMERCIAL

## 2024-04-10 DIAGNOSIS — I10 ESSENTIAL HYPERTENSION: ICD-10-CM

## 2024-04-10 LAB
ALBUMIN SERPL BCP-MCNC: 3.6 G/DL (ref 3.5–5)
ALP SERPL-CCNC: 122 U/L (ref 34–104)
ALT SERPL W P-5'-P-CCNC: 13 U/L (ref 7–52)
ANION GAP SERPL CALCULATED.3IONS-SCNC: 8 MMOL/L (ref 4–13)
AST SERPL W P-5'-P-CCNC: 18 U/L (ref 13–39)
BILIRUB SERPL-MCNC: 0.31 MG/DL (ref 0.2–1)
BUN SERPL-MCNC: 13 MG/DL (ref 5–25)
CALCIUM SERPL-MCNC: 9.9 MG/DL (ref 8.4–10.2)
CHLORIDE SERPL-SCNC: 105 MMOL/L (ref 96–108)
CHOLEST SERPL-MCNC: 203 MG/DL
CO2 SERPL-SCNC: 28 MMOL/L (ref 21–32)
CREAT SERPL-MCNC: 0.73 MG/DL (ref 0.6–1.3)
ERYTHROCYTE [DISTWIDTH] IN BLOOD BY AUTOMATED COUNT: 16.6 % (ref 11.6–15.1)
GFR SERPL CREATININE-BSD FRML MDRD: 79 ML/MIN/1.73SQ M
GLUCOSE P FAST SERPL-MCNC: 107 MG/DL (ref 65–99)
HCT VFR BLD AUTO: 40.1 % (ref 34.8–46.1)
HDLC SERPL-MCNC: 75 MG/DL
HGB BLD-MCNC: 12.2 G/DL (ref 11.5–15.4)
LDLC SERPL CALC-MCNC: 116 MG/DL (ref 0–100)
MCH RBC QN AUTO: 24.4 PG (ref 26.8–34.3)
MCHC RBC AUTO-ENTMCNC: 30.4 G/DL (ref 31.4–37.4)
MCV RBC AUTO: 80 FL (ref 82–98)
PLATELET # BLD AUTO: 306 THOUSANDS/UL (ref 149–390)
PMV BLD AUTO: 10.1 FL (ref 8.9–12.7)
POTASSIUM SERPL-SCNC: 4.2 MMOL/L (ref 3.5–5.3)
PROT SERPL-MCNC: 6.8 G/DL (ref 6.4–8.4)
RBC # BLD AUTO: 5.01 MILLION/UL (ref 3.81–5.12)
SODIUM SERPL-SCNC: 141 MMOL/L (ref 135–147)
T4 FREE SERPL-MCNC: 0.82 NG/DL (ref 0.61–1.12)
TRIGL SERPL-MCNC: 59 MG/DL
TSH SERPL DL<=0.05 MIU/L-ACNC: 1.5 UIU/ML (ref 0.45–4.5)
WBC # BLD AUTO: 5.84 THOUSAND/UL (ref 4.31–10.16)

## 2024-04-10 PROCEDURE — 85027 COMPLETE CBC AUTOMATED: CPT

## 2024-04-10 PROCEDURE — 80053 COMPREHEN METABOLIC PANEL: CPT

## 2024-04-10 PROCEDURE — 84439 ASSAY OF FREE THYROXINE: CPT

## 2024-04-10 PROCEDURE — 80061 LIPID PANEL: CPT

## 2024-04-10 PROCEDURE — 36415 COLL VENOUS BLD VENIPUNCTURE: CPT

## 2024-04-10 PROCEDURE — 84443 ASSAY THYROID STIM HORMONE: CPT

## 2024-04-11 ENCOUNTER — EVALUATION (OUTPATIENT)
Dept: PHYSICAL THERAPY | Facility: CLINIC | Age: 79
End: 2024-04-11
Payer: COMMERCIAL

## 2024-04-11 ENCOUNTER — APPOINTMENT (OUTPATIENT)
Dept: LAB | Facility: CLINIC | Age: 79
End: 2024-04-11
Payer: COMMERCIAL

## 2024-04-11 DIAGNOSIS — M25.512 ACUTE PAIN OF LEFT SHOULDER: Primary | ICD-10-CM

## 2024-04-11 DIAGNOSIS — S42.212D CLOSED DISPLACED FRACTURE OF SURGICAL NECK OF LEFT HUMERUS WITH ROUTINE HEALING, UNSPECIFIED FRACTURE MORPHOLOGY, SUBSEQUENT ENCOUNTER: ICD-10-CM

## 2024-04-11 DIAGNOSIS — Z96.612 STATUS POST REVERSE TOTAL REPLACEMENT OF LEFT SHOULDER: ICD-10-CM

## 2024-04-11 LAB
CREAT UR-MCNC: 99 MG/DL
MICROALBUMIN UR-MCNC: 10.1 MG/L
MICROALBUMIN/CREAT 24H UR: 10 MG/G CREATININE (ref 0–30)

## 2024-04-11 PROCEDURE — 82043 UR ALBUMIN QUANTITATIVE: CPT

## 2024-04-11 PROCEDURE — 82570 ASSAY OF URINE CREATININE: CPT

## 2024-04-11 PROCEDURE — 97162 PT EVAL MOD COMPLEX 30 MIN: CPT | Performed by: PHYSICAL THERAPIST

## 2024-04-11 NOTE — TELEPHONE ENCOUNTER
IC called pt about referral received for services. Phone rang and eventually voice mail came on. Mailbox is full, so IC could not leave message for pt.  2nd outreach attempt to pt.

## 2024-04-11 NOTE — PROGRESS NOTES
PT Evaluation     Today's date: 2024  Patient name: Eulalio Raphael  : 1945  MRN: [id number]  Referring provider: Tigre Kaufman DO  Dx:   Encounter Diagnosis     ICD-10-CM    1. Acute pain of left shoulder  M25.512       2. Closed displaced fracture of surgical neck of left humerus with routine healing, unspecified fracture morphology, subsequent encounter  S42.212D Ambulatory Referral to Physical Therapy      3. Status post reverse total replacement of left shoulder  Z96.612 Ambulatory Referral to Physical Therapy                     Assessment  Assessment details: Eulalio Raphael is a 78 y.o. female who presents with pain, decreased strength, decreased ROM, decreased joint mobility, and postural dysfunction. Due to these impairments, patient has difficulty performing ADL's, lifting/carrying, reaching. Patient's clinical presentation is consistent with their referring diagnosis of Closed displaced fracture of surgical neck of left humerus with routine healing, unspecified fracture morphology, subsequent encounter  Plan: Ambulatory Referral to Physical Therapy    Status post reverse total replacement of left shoulder  Plan: Ambulatory Referral to Physical Therapy  . Patient has been educated in home exercise program and plan of care. Patient would benefit from skilled physical therapy services to address their aforementioned functional limitations and progress towards prior level of function and independence with home exercise program.     Impairments: abnormal or restricted ROM, activity intolerance, impaired physical strength, lacks appropriate home exercise program, pain with function, poor posture  and poor body mechanics  Barriers to therapy: Lymphedema (+)  Understanding of Dx/Px/POC: good   Prognosis: fair    Goals  Short Term Goals to be accomplished in 3 weeks:  STG1: Pt will be I with HEP  STG2: Pt will be I with posture management   STG3: Pt will demo 50% improvement in shoulder AROM to  improve self care and household ADLs   STG4: Pt will demo 1/2 MMT strength in shoulder  STG5: Pt will report pain < 3/10 in shoulder     Long Term Goals to be accomplished in 6 weeks:   LTG1: Pt will demo full shoulder AROM to return to household duties  LTG2: Pt will return to self care/household ADLs pain free as per PLOF  LTG3: Pt will demo shoulder strength WNL       Plan  Plan details:  HEP development, stretching, strengthening, A/AA/PROM, joint mobilizations, posture education, STM/MI as needed to reduce muscle tension, muscle reeducation, PLOC discussed and agreed upon with patient.      Patient would benefit from: PT eval and skilled physical therapy  Planned modality interventions: cryotherapy and thermotherapy: hydrocollator packs  Planned therapy interventions: manual therapy, neuromuscular re-education, self care, therapeutic exercise, therapeutic activities and home exercise program  Frequency: 2x week (2-3x/week)  Treatment plan discussed with: patient        Subjective Evaluation    History of Present Illness  Mechanism of injury: Prior to surgery pt was experiencing lymphedema in L UE. She had a fall 2 months ago suffering from humerus fracture 24 and underwent reverse TSA 24. Pt had home health coming in. Pt still having stiffness but felt her home PT went well. Pain usually about 7/10 in her shoulder. Pt feel;s she has pain with all activity so is limited in mobility and functionality. She acknowledges she is performing about half of her ADLs currently. Pt is unable to meal prep and is having assistance form her daughter on a regular basis for self care. Occass taking tylenol.     Pt goal to be able to reach her arm up and hold her arm up so she can return to her typical daily tasks.   Pain  Current pain ratin  At worst pain ratin          Objective     Postural Observations  Seated posture: poor  Standing posture: fair    Additional Postural Observation Details  Inc thoracic  kyphosis, rounded shldrs, FHP    Observations     Additional Observation Details  Significant Lymphedema throughout L UE    Neurological Testing     Sensation     Shoulder   Left Shoulder   Intact: light touch    Right Shoulder   Intact: Light touch    Active Range of Motion   Left Shoulder   Flexion: 40 degrees   Abduction: 55 degrees   External rotation 0°: -28 degrees   Internal rotation BTB: Active internal rotation behind the back: L back pocket.     Right Shoulder   Flexion: 95 degrees   Abduction: 95 degrees   External rotation 0°: 40 degrees   Internal rotation BTB: L5   Horizontal adduction: WFL    Additional Active Range of Motion Details  Lacking 28 deg from neutral posture rotation L shoulder (28 deg interanl rotation is max ER)    Passive Range of Motion   Left Shoulder   Flexion: 115 degrees   Abduction: 120 degrees   External rotation 0°: -20 degrees     Strength/Myotome Testing     Right Shoulder   Normal muscle strength    Additional Strength Details  L shldr and elbow strength 3- to 3/5 MMT throughout             Eval/ Re-eval Auth #/ Referral # Total visits Start date  Expiration date Total active units  Total manual units  PT only or  PT+OT?   4/11/24 Requested                                                     Precautions: Standard. Hx Breast cancer and m,mastectomy >17 years ago. L UE lymphedema. MD f/u 5/6/24.      Visit 1       4/11/24                           Neuro Re-Ed                                                        Ther Ex       Table slides Flex, Abd Rev      Supine HHA flex OH x10      SL ER x10      PROM KW 5'      Scap sq x10                           Ther Activity       HEP Access Code [email or web address]                                  Modalities

## 2024-04-18 ENCOUNTER — EVALUATION (OUTPATIENT)
Dept: PHYSICAL THERAPY | Facility: CLINIC | Age: 79
End: 2024-04-18
Payer: COMMERCIAL

## 2024-04-18 DIAGNOSIS — S42.212D CLOSED DISPLACED FRACTURE OF SURGICAL NECK OF LEFT HUMERUS WITH ROUTINE HEALING, UNSPECIFIED FRACTURE MORPHOLOGY, SUBSEQUENT ENCOUNTER: Primary | ICD-10-CM

## 2024-04-18 DIAGNOSIS — Z85.3 HISTORY OF BREAST CANCER IN FEMALE: ICD-10-CM

## 2024-04-18 DIAGNOSIS — I89.0 LYMPHEDEMA OF LEFT ARM: ICD-10-CM

## 2024-04-18 DIAGNOSIS — Z96.612 STATUS POST REVERSE TOTAL REPLACEMENT OF LEFT SHOULDER: ICD-10-CM

## 2024-04-18 DIAGNOSIS — M25.512 ACUTE PAIN OF LEFT SHOULDER: ICD-10-CM

## 2024-04-18 PROCEDURE — 97164 PT RE-EVAL EST PLAN CARE: CPT | Performed by: PHYSICAL THERAPIST

## 2024-04-18 PROCEDURE — 97535 SELF CARE MNGMENT TRAINING: CPT | Performed by: PHYSICAL THERAPIST

## 2024-04-18 NOTE — PROGRESS NOTES
Lymphedema Evaluation    Today's Date: 2024  Patient name: Eulalio Raphael  : 1945  MRN: [id number]  Referring provider: Tigre Kaufman DO  Dx:  Encounter Diagnosis     ICD-10-CM    1. Closed displaced fracture of surgical neck of left humerus with routine healing, unspecified fracture morphology, subsequent encounter  S42.212D PT plan of care cert/re-cert      2. Status post reverse total replacement of left shoulder  Z96.612 PT plan of care cert/re-cert      3. Acute pain of left shoulder  M25.512 PT plan of care cert/re-cert      4. Lymphedema of left arm  I89.0 PT plan of care cert/re-cert      5. History of breast cancer in female  Z85.3 PT plan of care cert/re-cert          Start Time: 1115  Stop Time: 1155  Total time in clinic (min): 40 minutes       Assessment/Plan  Eulalio Raphael is a 78 y.o. year old female with complaints of L UE swelling. Patient referred to lymphedema therapy by PT Marleni Ramos.  Patient's presentation is consistent with secondary UE lymphedema secondary to L humeral fracture with L reverse TSA repair with PMH of L breast cancer and subsequent L UE lymphedema with the following impairments found on evaluation: swelling, decreased ROM, decreased strength, and functional limitations including reaching, lifting, and overhead activities. Relevant medical history includes PMH L breast cancer. The listed physical impairments contribute to the following functional limitations: decreased tolerance to reaching, lifting, overhead activity; and the following disability: difficulty making meals, donning and doffing clothing, reaching, and lifting. Eulalio is a good candidate for both orthopedic physical therapy to address elevated pain levels, decreased ROM, strength, and functional limitations and lymphedema physical therapy to address increased swelling and pain, which is affecting her orthopedic deficits and would benefit from skilled physical therapy to address the above  impairments in order to allow the patient to achieve the goals listed and return to prior level of function. Lymphedema Physical therapy plan of care to include manual lymph drainage, compression garments, skin management. Compression prescription includes: TBD. Insurance coverage for compression supplies: TBD    During initial evaluation, education was provided on lymphatic anatomy and physiology, edema differential diagnosis, edema risk reduction behaviors, and healthy habits; decongestion vs maintenance treatment options. Recommendations were made for skin care, elevation of the edematous limb, and muscle pump exercises to address edema and patient consented to these recommendations. Patient also educated on diagnosis, plan of care and prognosis.  Eulalio is in agreement with recommended plan of care and goals for therapy, and demonstrates motivation for active participation in proposed plan of care. Care coordinated with PT Marleni Ramos- patient to be seen twice per week for PT- 1x/wk with PT Marleni Ramos for orthopedic management and 1x/wk with PT Debbie Kimbrough for swelling and lymphedema treatment, which is impacting her L UE healing and impairments.     Plan  Patient would benefit from: skilled physical therapy  Referral necessary: No  Planned therapy interventions: manual therapy, massage, muscle pump exercises, neuromuscular re-education, therapeutic activities, therapeutic exercise, stretching, strengthening, graded activity, functional ROM exercises, flexibility and compression  Frequency: 2x week- Care coordinated with PT Marleni Ramos- patient to be seen twice per week for PT- 1x/wk with PT Marleni Ramos for orthopedic management and 1x/wk with PT Debbie Kimbrough for swelling and lymphedema treatment, which is impacting her L UE healing and impairments.   Duration in weeks: 12  Plan of Care beginning date: 4/11/24  Plan of Care expiration date: 6/30/24  Treatment plan discussed with: patient  "      Subjective/History:  Chief Complaint: L UE swelling  HPI: Per evaluation performed by PT Marleni Ramos on 4/11/24: \"Prior to surgery pt was experiencing lymphedema in L UE. She had a fall 2 months ago suffering from humerus fracture 1/30/24 and underwent reverse TSA 2/1/24. Pt had home health coming in. Pt still having stiffness but felt her home PT went well. Pain usually about 7/10 in her shoulder. Pt feel;s she has pain with all activity so is limited in mobility and functionality. She acknowledges she is performing about half of her ADLs currently. Pt is unable to meal prep and is having assistance form her daughter on a regular basis for self care. Occass taking tylenol. \"  Prior Edema Treatments:   Imaging: x-ray revealing closed nondisplaced fracture of L clavicle and L humeral fracture  Relevant PMHx: Pt has PMH of L breast cancer with subsequent L UE swelling   Personal history of Cancer? Yes; PMH L breast cancer; original diagnosis 17 years ago, 5 years later recurred and swelling present in L UE ever since. \"Removed all of them\" referring to lymph nodes. Bilateral mastectomy with lymph node transplant.     Lymphedema special questions  Feeling of heaviness, fullness, pressure? yes  Resolves with elevation: No  Sleeping location? In bed,   Change in fit of shoes/clothes/jewelry?yes  History of Cellulitis?  no  History of S/DVT? no    Systems Review:  Cardiovascular hx: Yes; HTN, diabetes  Thyroid dysfunction: Yes; papillary thyroid carcinoma  Diabetes: Yes  Kidney disease:No   Liver disease: No  Abdominal surgeries: Yes  Orthopedic injuries/surgeries: Yes; L reverse TSA to repair L humeral fracture after patient sustained a fall.     Pain: 6/10 current; 8/10 with movement  Function: Limited with making a meal, reaching, dressing, household chores  Working Status: Retired  Exercise status: N/A  Patient goals: \"Pt goal to be able to reach her arm up and hold her arm up so she can return to her " "typical daily tasks.\"      Objective  Lymphedema Exam: Upper Extremity  Hand dominance: R hand  Upper extremity Edema location and quality: left  Fingers: most swelling present at base of fingers  Dorsum of hand: Present  Palm: None  Wrist: Present  Forearm: Present  Elbow: Present  Upper arm: Present  Shoulder: Minimal  Lymphedema classification (0 latent, 1 reverse, 2 non-rev, 3 elephantiasis): grade 2  >> REFER TO FLOW SHEET FOR GIRTH MEASUREMENTS <<  Axillary Web Syndrome? no    Skin Assessment:  Stemmer's sign? no  Fibrosis (0 normal - 4 >severe): grade 2  Scar present: yes; scar from reverse TSA demonstrates good healing, no bleeding or drainage  Surface anatomy changes: Yes  Bony prominences: Limited view of L styloid process in wrist  Tendon pathways: Limited view of tendons of L hand  Joint creases: Distorted L elbow crease and wrist crease    Functional Capacity:  Gait assessment: Patient independently walks with quad cane in R UE, no remarkable antalgias or deficits besides increased visual reliance when ambulating  Transfer status: able to independently supine <> sit and sit <> stand throughout evaluation  Ability to don/doff clothing/garments: Patient needs assist with L UE with donning jacket   Upper quarter Sensation: Normal  Upper quarter Range of Motion: Normal R UE, abnormal L UE secondary to precautions/ restrictions from reverse TSA  L wrist flexion/ extension: WFL  L elbow flexion/ extension AROM: 130 degrees flexion, full extension achieved  Upper Quarter Strength: Abnormal, L UE not tested per MD reverse TSA protocol  R shoulder flexion, abduction, ER/IR at side : 4/5 when tested in sitting  L elbow flexion/ extension: 3/5  L wrist flexion/ extension: 3/5  L shoulder MMT not tested secondary to protocol     UPPER EXTREMITY LEFT CALCULATIONS    Flowsheet Row Most Recent Value   Volume UE (mL) 5385   Difference from last visit (mL)  -5385   Difference from first visit (mL)  -5385      UPPER " EXTREMITY RIGHT CALCULATIONS    Flowsheet Row Most Recent Value   Volume UE (mL) 3296   Difference from last visit (mL)  -3296   Difference from first visit (mL)  -3296                Precautions: Precautions: Standard. Hx Breast cancer and m,mastectomy >17 years ago. L UE lymphedema. MD f/u 5/6/24.    Insurance:  AMA/CMS Eval/ Re-eval POC expires FOTO Auth Status Co-Insurance                                                    4/11   2. 4/15 RE lymph   3.    4.   5.    6.      7.    8.    9.    10.    11.    12.      13.    14.    15.    16.  17.  18.      19.    20.   21.   22.   23.   24.      25.    26. 27. 28. 29. 30.   31. 32.  33. 34. 35. 36.        Compression Rx 4/15                          Manual Ther         volumetrics Done         MLD         Compression Bandaging         Wound Care                  Ther Ex         Remedial Exercise                                    Ther Activity & Self Care         Skin care         Garment Fit/Train         Sleeping position                  Pt Education         Edema differential dx         Lymphatic A&P Done        Compression options Done

## 2024-04-22 ENCOUNTER — OFFICE VISIT (OUTPATIENT)
Dept: PHYSICAL THERAPY | Facility: CLINIC | Age: 79
End: 2024-04-22
Payer: COMMERCIAL

## 2024-04-22 DIAGNOSIS — M25.512 ACUTE PAIN OF LEFT SHOULDER: ICD-10-CM

## 2024-04-22 DIAGNOSIS — Z85.3 HISTORY OF BREAST CANCER IN FEMALE: ICD-10-CM

## 2024-04-22 DIAGNOSIS — Z96.612 STATUS POST REVERSE TOTAL REPLACEMENT OF LEFT SHOULDER: ICD-10-CM

## 2024-04-22 DIAGNOSIS — S42.212D CLOSED DISPLACED FRACTURE OF SURGICAL NECK OF LEFT HUMERUS WITH ROUTINE HEALING, UNSPECIFIED FRACTURE MORPHOLOGY, SUBSEQUENT ENCOUNTER: Primary | ICD-10-CM

## 2024-04-22 PROCEDURE — 97140 MANUAL THERAPY 1/> REGIONS: CPT | Performed by: PHYSICAL THERAPIST

## 2024-04-22 NOTE — PROGRESS NOTES
"Daily Note     Today's date: 2024  Patient name: Eulalio Raphael  : 1945  MRN: [id number]  Referring provider: Tigre Kaufman DO  Dx:   Encounter Diagnosis     ICD-10-CM    1. Closed displaced fracture of surgical neck of left humerus with routine healing, unspecified fracture morphology, subsequent encounter  S42.212D       2. Status post reverse total replacement of left shoulder  Z96.612       3. Acute pain of left shoulder  M25.512       4. History of breast cancer in female  Z85.3           Start Time: 1255  Stop Time: 1345  Total time in clinic (min): 50 minutes    Subjective: Eulalio reports \"arm is in some pain\" with patient rating pain 6-7/10 upon arrival to therapy today.        Objective: See treatment diary below      Assessment: Tolerated treatment well. PT reviewing lymphatic system and principles of MLD treatment. MLD Sequence included:  diaphragmatic breathing, short neck sequence, superficial abdominal sequence, stimulation of bilateral axillary lymph nodes, L inguinal lymph nodes and interaxillary watershed.  This was followed by stationary circles proximal to distal. PT issuing 5 inch tubigrip for L UE and 4 inch Tubigrip for L hand, awaiting response from MD regarding a script for patient in order to check compression benefits. Patient would benefit from continued PT      Plan: Continue per plan of care.  Progress treatment as tolerated.        Precautions: Precautions: Standard. Hx Breast cancer and m,mastectomy >17 years ago. L UE lymphedema. MD f/u 24.      Insurance:  AMA/CMS Eval/ Re-eval POC expires FOTO Auth Status Co-Insurance    - KW       CMS - LX   22 visits total approved  Ref. ID  [id number]                                        2.  RE lymph   3.     lymph   4.   5.    6.      7.    8.    9.    10.    11.    12.      13.    14.    15.    16.  17.  18.      19.    20.   21.   22.   23.   24.      25.    26. 27. 28. 29. 30.   31. 32.  33. 34. 35. " 36.        Compression Rx 4/18 4/22                         Manual Ther         volumetrics Done         MLD  40' total        Compression Bandaging  Tubigrip 10'        Wound Care                  Ther Ex         Remedial Exercise                                    Ther Activity & Self Care         Skin care         Garment Fit/Train  Done- reviewed with pt & daughter       Sleeping position                  Pt Education         Edema differential dx         Lymphatic A&P Done        Compression options Done

## 2024-04-29 DIAGNOSIS — I89.0 LYMPHEDEMA OF LEFT ARM: Primary | ICD-10-CM

## 2024-04-30 ENCOUNTER — OFFICE VISIT (OUTPATIENT)
Dept: PHYSICAL THERAPY | Facility: CLINIC | Age: 79
End: 2024-04-30
Payer: COMMERCIAL

## 2024-04-30 DIAGNOSIS — S42.212D CLOSED DISPLACED FRACTURE OF SURGICAL NECK OF LEFT HUMERUS WITH ROUTINE HEALING, UNSPECIFIED FRACTURE MORPHOLOGY, SUBSEQUENT ENCOUNTER: Primary | ICD-10-CM

## 2024-04-30 DIAGNOSIS — Z96.612 STATUS POST REVERSE TOTAL REPLACEMENT OF LEFT SHOULDER: ICD-10-CM

## 2024-04-30 DIAGNOSIS — M25.512 ACUTE PAIN OF LEFT SHOULDER: ICD-10-CM

## 2024-04-30 DIAGNOSIS — I89.0 LYMPHEDEMA OF LEFT ARM: ICD-10-CM

## 2024-04-30 DIAGNOSIS — Z85.3 HISTORY OF BREAST CANCER IN FEMALE: ICD-10-CM

## 2024-04-30 PROCEDURE — 97140 MANUAL THERAPY 1/> REGIONS: CPT | Performed by: PHYSICAL THERAPIST

## 2024-04-30 NOTE — PROGRESS NOTES
Daily Note     Today's date: 2024  Patient name: Eulalio Raphael  : 1945  MRN: [id number]  Referring provider: Tigre Kaufman DO  Dx:   Encounter Diagnosis     ICD-10-CM    1. Closed displaced fracture of surgical neck of left humerus with routine healing, unspecified fracture morphology, subsequent encounter  S42.212D       2. Status post reverse total replacement of left shoulder  Z96.612       3. Acute pain of left shoulder  M25.512       4. History of breast cancer in female  Z85.3       5. Lymphedema of left arm  I89.0                        Subjective: Eulalio reports she has been wearing the tubigrip since Debbie gave it to her at her last visit.      Objective: See treatment diary below      Assessment: Tolerated treatment well.  MLD Sequence included:  diaphragmatic breathing, short neck sequence, superficial abdominal sequence, stimulation of bilateral axillary lymph nodes, L inguinal lymph nodes and interaxillary watershed.  This was followed by stationary circles proximal to distal. Cut patient a  new piece of tubigrip for hand.  Patient would benefit from continued PT      Plan: Continue per plan of care.  Progress treatment as tolerated.        Precautions: Precautions: Standard. Hx Breast cancer and m,mastectomy >17 years ago. L UE lymphedema. MD f/u 24.      Insurance:  AMA/CMS Eval/ Re-eval POC expires FOTO Auth Status Co-Insurance    - KW       CMS - LX   22 visits total approved  Ref. ID  [id number]                                        2.  RE lymph   3.     lymph   4.   lymph   5.    6.      7.    8.    9.    10.    11.    12.      13.    14.    15.    16.  17.  18.      19.    20.   21.   22.   23.   24.      25.    26. 27. 28. 29. 30.   31. 32.  33. 34. 35. 36.        Compression Rx                         Manual Ther         volumetrics Done         MLD  40' total  X53 min      Compression Bandaging  Tubigrip 10'        Wound Care                   Ther Ex         Remedial Exercise                                    Ther Activity & Self Care         Skin care         Garment Fit/Train  Done- reviewed with pt & daughter       Sleeping position                  Pt Education         Edema differential dx         Lymphatic A&P Done        Compression options Done

## 2024-05-01 ENCOUNTER — OFFICE VISIT (OUTPATIENT)
Dept: PHYSICAL THERAPY | Facility: CLINIC | Age: 79
End: 2024-05-01
Payer: COMMERCIAL

## 2024-05-01 DIAGNOSIS — S42.212D CLOSED DISPLACED FRACTURE OF SURGICAL NECK OF LEFT HUMERUS WITH ROUTINE HEALING, UNSPECIFIED FRACTURE MORPHOLOGY, SUBSEQUENT ENCOUNTER: Primary | ICD-10-CM

## 2024-05-01 DIAGNOSIS — M25.512 ACUTE PAIN OF LEFT SHOULDER: ICD-10-CM

## 2024-05-01 DIAGNOSIS — Z96.612 STATUS POST REVERSE TOTAL REPLACEMENT OF LEFT SHOULDER: ICD-10-CM

## 2024-05-01 PROCEDURE — 97110 THERAPEUTIC EXERCISES: CPT

## 2024-05-01 NOTE — PROGRESS NOTES
"Daily Note          Today's date: 2024  Patient name: Eulalio Raphael  : 1945  MRN: [id number]  Referring provider: Tigre Kaufman DO  Dx:   Encounter Diagnosis     ICD-10-CM    1. Closed displaced fracture of surgical neck of left humerus with routine healing, unspecified fracture morphology, subsequent encounter  S42.212D       2. Status post reverse total replacement of left shoulder  Z96.612       3. Acute pain of left shoulder  M25.512           Start Time: 935  Stop Time: 1010  Total time in clinic (min): 35 minutes    Subjective: Pt is feeling well. LUE is wrapped secondary to lymphedema. Pt is accompanied by daughter.       Objective: See treatment diary below      Assessment: Tolerated treatment well. Noted limited mobility in LUE abduction and ER at 90 degree Abd. Improvement in ER ROM at 60 degree ROM. Flexion ROM improved with PROM. Pt required tactile cueing for scapular retraction bilaterally. Patient would benefit from continued PT to address limitations in LUE ROM to promote independence with ADLS.      Plan: Continue per plan of care.       Precautions: Precautions: Standard. Hx Breast cancer and m,mastectomy >17 years ago. L UE lymphedema. MD f/u 24.      Insurance:  AMA/CMS Eval/ Re-eval POC expires FOTO Auth Status Co-Insurance    - KW       CMS - LX   22 visits total approved  Ref. ID  [id number]                                        2.  RE lymph   3.     lymph   4.   lymph   5. 5/   6.      7.    8.    9.    10.    11.    12.      13.    14.    15.    16.  17.  18.      19.    20.   21.   22.   23.   24.      25.    26. 27. 28. 29. 30.   31. 32.  33. 34. 35. 36.        Visit 1 2      24                          Neuro Re-Ed                                                        Ther Ex       Table slides Flex, Abd Rev Flex, Abd  20x ea      Supine HHA flex OH x10 x20     SL ER x10 x20     PROM KW 5' MR 10'     Scap sq x10 5\" x20     Wand abd " AAROM  X10                    Ther Activity       HEP Access Code [email or web address]                                  Modalities

## 2024-05-06 ENCOUNTER — OFFICE VISIT (OUTPATIENT)
Dept: OBGYN CLINIC | Facility: CLINIC | Age: 79
End: 2024-05-06
Payer: COMMERCIAL

## 2024-05-06 ENCOUNTER — APPOINTMENT (OUTPATIENT)
Dept: RADIOLOGY | Facility: AMBULARY SURGERY CENTER | Age: 79
End: 2024-05-06
Attending: STUDENT IN AN ORGANIZED HEALTH CARE EDUCATION/TRAINING PROGRAM
Payer: COMMERCIAL

## 2024-05-06 VITALS — HEIGHT: 62 IN | WEIGHT: 136.2 LBS | BODY MASS INDEX: 25.06 KG/M2

## 2024-05-06 DIAGNOSIS — S42.212D CLOSED DISPLACED FRACTURE OF SURGICAL NECK OF LEFT HUMERUS WITH ROUTINE HEALING, UNSPECIFIED FRACTURE MORPHOLOGY, SUBSEQUENT ENCOUNTER: Primary | ICD-10-CM

## 2024-05-06 DIAGNOSIS — S42.212D CLOSED DISPLACED FRACTURE OF SURGICAL NECK OF LEFT HUMERUS WITH ROUTINE HEALING, UNSPECIFIED FRACTURE MORPHOLOGY, SUBSEQUENT ENCOUNTER: ICD-10-CM

## 2024-05-06 PROCEDURE — 73030 X-RAY EXAM OF SHOULDER: CPT

## 2024-05-06 PROCEDURE — 99213 OFFICE O/P EST LOW 20 MIN: CPT | Performed by: STUDENT IN AN ORGANIZED HEALTH CARE EDUCATION/TRAINING PROGRAM

## 2024-05-06 NOTE — PROGRESS NOTES
Orthopaedics Office Visit - New Patient Visit    ASSESSMENT/PLAN:    Assessment:   Left shoulder reverse total shoulder arthroplasty for proximal humerus fracture, DOS: 2/1/2024    Plan:   Radiograph reviewed with patient and family member displaying well-maintained alignment of the patient's left reverse total shoulder arthroplasty with no signs of loosening or failure.  The tuberosities are still well-maintained and there is heterotopic ossification formation   She is stiffer than usual for this stage in her postoperative recovery due to prolonged use of the sling and doing home PT. Recommended to increase PT to twice a week to work on shoulder ROM.  Will be removing restrictions on range of motion as instructed in the physical therapy prescription  Range of motion as tolerated  No lifting more than 10 pounds  Continue outpatient physical therapy for ROM and strengthening. New prescription provided today.  Patient will follow-up in 6 weeks for repeat x-ray evaluation and repeat range of motion check  Continue with the [facility] rehabilitation program for reverse total shoulder arthroplasties with fracture.    To Do Next Visit:  Follow up in 8 weeks with repeat x-rays     _____________________________________________________  CHIEF COMPLAINT:  Chief Complaint   Patient presents with    Left Shoulder - Post-op         SUBJECTIVE:  Eulalio Raphael is a 78 y.o. female who presents 2 weeks s/p left shoulder reverse total shoulder arthroplasty, 2/1/2024.  She is here with female family member.  Today she complains of left generalized shoulder pain.  She has enrico compliant with immobilizer shoulder sling.  She does use Tylenol and oxycodone for pain.   She does use 4-point cane since her injury.  Patient overall states that her pain is significantly improved from her injury.  Denies any numbness or paresthesias in the left upper extremity    Interval history 3/25/2024  She is 8 weeks status post reverse total shoulder  arthroplasty for proximal humerus fracture, DOS 2/1/2024. Today she denies any pain at rest. She has some pain after doing PT.  She takes Tylenol occasionally for pain.  She has been using her sling and coming out of it for range of motion exercises.  She is doing home physical therapy according to the [facility] protocol. She denies any numbness or tingling in the left upper extremity.     Interval history 5/6/2024  She is 3 months out from her left reverse total shoulder arthroplasty for her proximal humerus fracture. DOS 2/1/2024. She reports mild laterally based pain on the left shoulder that increases with range of motion exercises. She has been doing physical therapy once a week for range of motion and feels like she should do twice a week to increase her ROM.  She asked breast the physical therapy exercises she has been performing with physical therapy and most of these exercises were directed at increasing her mobility and function with her elbow range of motion.  She denies new injury or fall. She denies new numbness or tingling       PAST MEDICAL HISTORY:  Past Medical History:   Diagnosis Date    Acute encephalopathy     Breast cancer (HCC)     Hypertension        PAST SURGICAL HISTORY:  Past Surgical History:   Procedure Laterality Date    HYSTERECTOMY      MASTECTOMY MODIFIED RADICAL Bilateral     REVERSE TOTAL SHOULDER ARTHROPLASTY Left 2/1/2024    Procedure: ARTHROPLASTY SHOULDER REVERSE, and all associated procedures;  Surgeon: Tigre Kaufman DO;  Location: [facility];  Service: Orthopedics    SPINE SURGERY      THYROIDECTOMY N/A 6/21/2023    Procedure: LEFT HEMITHYROIDECTOMY, SUBSTERNAL;  Surgeon: Leonel James MD;  Location: [facility];  Service: ENT       FAMILY HISTORY:  Family History   Problem Relation Age of Onset    Hypertension Father     Hypertension Sister     Hypertension Brother        SOCIAL HISTORY:  Social History     Tobacco Use    Smoking status: Never     Passive  "exposure: Never    Smokeless tobacco: Never   Vaping Use    Vaping status: Never Used   Substance Use Topics    Alcohol use: Yes     Alcohol/week: 1.0 standard drink of alcohol     Types: 1 Glasses of wine per week    Drug use: Never       MEDICATIONS:    Current Outpatient Medications:     acetaminophen (TYLENOL) 325 mg tablet, Take 3 tablets (975 mg total) by mouth every 8 (eight) hours as needed for mild pain, Disp: , Rfl:     amLODIPine (NORVASC) 10 mg tablet, TAKE 1 TABLET BY MOUTH EVERY DAY, Disp: 90 tablet, Rfl: 1    levothyroxine 25 mcg tablet, TAKE 1 TABLET BY MOUTH EVERY DAY, Disp: 90 tablet, Rfl: 1    lidocaine (LIDODERM) 5 %, Apply 1 patch topically over 12 hours daily Remove & Discard patch within 12 hours or as directed by MD, Disp: 15 patch, Rfl: 0    metFORMIN (GLUCOPHAGE-XR) 500 mg 24 hr tablet, Take 2 tablets (1,000 mg total) by mouth daily with breakfast, Disp: 180 tablet, Rfl: 1    Multiple Vitamin (multivitamin) capsule, Take 1 capsule by mouth daily, Disp: , Rfl:     PARoxetine (PAXIL) 20 mg tablet, TAKE 1 TABLET BY MOUTH EVERY DAY, Disp: 90 tablet, Rfl: 1    ALLERGIES:  Allergies   Allergen Reactions    Ace Inhibitors Angioedema    Erythromycin Swelling       REVIEW OF SYSTEMS:  MSK: Left shoulder pain  Neuro: No numbness or paresthesias  Pertinent items are otherwise noted in HPI.  A comprehensive review of systems was otherwise negative.    LABS:  HgA1c:   Lab Results   Component Value Date    HGBA1C 6.1 02/07/2024     BMP:   Lab Results   Component Value Date    CALCIUM 9.9 04/10/2024    K 4.2 04/10/2024    CO2 28 04/10/2024     04/10/2024    BUN 13 04/10/2024    CREATININE 0.73 04/10/2024     CBC: No components found for: \"CBC\"    _____________________________________________________  PHYSICAL EXAMINATION:  Vital signs: Ht 5' 2\" (1.575 m)   Wt 61.8 kg (136 lb 3.2 oz)   BMI 24.91 kg/m²   General: No acute distress, awake and alert  Psychiatric: Mood and affect appear " appropriate  HEENT: Trachea Midline, No torticollis, no apparent facial trauma  Cardiovascular: No audible murmurs; Extremities appear perfused  Pulmonary: No audible wheezing or stridor  Skin: No open lesions; see further details (if any) below    MUSCULOSKELETAL EXAMINATION:  Extremities:    Left shoulder:  The left shoulder was exposed inspected.  The patient's surgical incision is healed well without any surrounding erythema or dehiscence. Significant lymphedema in LUE which is chronic.  There is firmness to the left shoulder about the deltoid and along the incision consistent with heterotopic ossification. She is mildly tender over the shoulder laterally.  Passive range of motion was performed on the shoulder to 90 degrees of forward flexion, 90 degrees of abduction,20 degrees of external rotation, internal rotation to side pocket. No discomfort throughout range of motion.   Patient's sensation is intact to light touch in the axillary, median, radial, ulnar nerve distributions.  AIN, PIN, ulnar, axillary nerves are intact.  +2 radial pulses distally      _____________________________________________________  STUDIES REVIEWED:  I personally reviewed the images and interpretation is as follows:  Left shoulder x-ray demonstrate: Well-maintained alignment of the patient's left reverse total shoulder arthroplasty with no signs of loosening or hardware failure.  Tuberosities are healed adjacent to the proximal end of the implant demonstrating appropriate position. There is heterotopic ossification around the implant     PROCEDURES PERFORMED:  Procedures none     Júnior Smith MD

## 2024-05-09 ENCOUNTER — OFFICE VISIT (OUTPATIENT)
Dept: PHYSICAL THERAPY | Facility: CLINIC | Age: 79
End: 2024-05-09
Payer: COMMERCIAL

## 2024-05-09 DIAGNOSIS — I89.0 LYMPHEDEMA OF LEFT ARM: ICD-10-CM

## 2024-05-09 DIAGNOSIS — S42.212D CLOSED DISPLACED FRACTURE OF SURGICAL NECK OF LEFT HUMERUS WITH ROUTINE HEALING, UNSPECIFIED FRACTURE MORPHOLOGY, SUBSEQUENT ENCOUNTER: Primary | ICD-10-CM

## 2024-05-09 DIAGNOSIS — Z96.612 STATUS POST REVERSE TOTAL REPLACEMENT OF LEFT SHOULDER: ICD-10-CM

## 2024-05-09 DIAGNOSIS — Z85.3 HISTORY OF BREAST CANCER IN FEMALE: ICD-10-CM

## 2024-05-09 DIAGNOSIS — M25.512 ACUTE PAIN OF LEFT SHOULDER: ICD-10-CM

## 2024-05-09 PROCEDURE — 97140 MANUAL THERAPY 1/> REGIONS: CPT | Performed by: PHYSICAL THERAPIST

## 2024-05-09 NOTE — PROGRESS NOTES
Daily Note     Today's date: 2024  Patient name: Eulalio Raphael  : 1945  MRN: [id number]  Referring provider: Tigre Kaufman DO  Dx:   Encounter Diagnosis     ICD-10-CM    1. Closed displaced fracture of surgical neck of left humerus with routine healing, unspecified fracture morphology, subsequent encounter  S42.212D       2. Status post reverse total replacement of left shoulder  Z96.612       3. Acute pain of left shoulder  M25.512       4. History of breast cancer in female  Z85.3       5. Lymphedema of left arm  I89.0                          Subjective: Eulalio reports her arm has been much bigger than this before.      Objective: See treatment diary below      Assessment: Tolerated treatment well.  MLD Sequence included:  diaphragmatic breathing, short neck sequence, superficial abdominal sequence, stimulation of bilateral axillary lymph nodes, L inguinal lymph nodes and interaxillary watershed.  Patient continues to tolerate MLD without complaints.  We discussed ordering medi reduction garment for patient and she agrees.  Patient was measured today and garment will be ordered for patient.   Patient would benefit from continued PT      Plan: Continue per plan of care.  Progress treatment as tolerated.        Precautions: Precautions: Standard. Hx Breast cancer and m,mastectomy >17 years ago. L UE lymphedema. MD f/u 24.      Insurance:  AMA/CMS Eval/ Re-eval POC expires FOTO Auth Status Co-Insurance    - KW       CMS - LX   22 visits total approved  Ref. ID  [id number]                                        2.  RE lymph   3.     lymph   4.   lymph   5.   5/1 shoulder 6.   5 lymph   7.    8.    9.    10.    11.    12.      13.    14.    15.    16.  17.  18.      19.    20.   21.   22.   23.   24.      25.    26. 27. 28. 29. 30.   31. 32.  33. 34. 35. 36.        Compression Rx                        Manual Ther         volumetrics Done         MLD  40'  total  X53 min x50     Compression Bandaging  Tubigrip 10'        Wound Care             X10 garment measuring     Ther Ex         Remedial Exercise                                    Ther Activity & Self Care         Skin care         Garment Fit/Train  Done- reviewed with pt & daughter       Sleeping position                  Pt Education         Edema differential dx         Lymphatic A&P Done        Compression options Done

## 2024-05-22 ENCOUNTER — OFFICE VISIT (OUTPATIENT)
Dept: PHYSICAL THERAPY | Facility: CLINIC | Age: 79
End: 2024-05-22
Payer: COMMERCIAL

## 2024-05-22 DIAGNOSIS — Z96.612 STATUS POST REVERSE TOTAL REPLACEMENT OF LEFT SHOULDER: Primary | ICD-10-CM

## 2024-05-22 DIAGNOSIS — S42.212D CLOSED DISPLACED FRACTURE OF SURGICAL NECK OF LEFT HUMERUS WITH ROUTINE HEALING, UNSPECIFIED FRACTURE MORPHOLOGY, SUBSEQUENT ENCOUNTER: ICD-10-CM

## 2024-05-22 DIAGNOSIS — M25.512 ACUTE PAIN OF LEFT SHOULDER: ICD-10-CM

## 2024-05-22 PROCEDURE — 97112 NEUROMUSCULAR REEDUCATION: CPT

## 2024-05-22 PROCEDURE — 97110 THERAPEUTIC EXERCISES: CPT

## 2024-05-22 NOTE — PROGRESS NOTES
Daily Note          Today's date: 2024  Patient name: Eulalio Raphael  : 1945  MRN: [id number]  Referring provider: Tigre Kaufman DO  Dx:   Encounter Diagnosis     ICD-10-CM    1. Status post reverse total replacement of left shoulder  Z96.612       2. Closed displaced fracture of surgical neck of left humerus with routine healing, unspecified fracture morphology, subsequent encounter  S42.212D Ambulatory Referral to Physical Therapy      3. Acute pain of left shoulder  M25.512                      Subjective: Patient reports pain is improving, but is still there. She presents without anything on her LUE and has not heard anything in regards to her lymphedema garment.      Objective: See treatment diary below      Assessment: Tolerated treatment well. PROM is improving from previous sessions with continued stiffness into ER. Advanced AAROM activities to tolerance as well as added resisted scap isos which patient tolerated well. Patient struggled with addition of wall slides into flexion secondary to difficulty making contact with wall with Lt hand due to swelling. Patient would benefit from continued PT to address limitations in LUE ROM to promote independence with ADLS.      Plan: Continue per plan of care. Advance per primary PT.        Precautions: Precautions: Standard. Hx Breast cancer and m,mastectomy >17 years ago. L UE lymphedema. MD f/u 24.      Insurance:  AMA/CMS Eval/ Re-eval POC expires FOTO Auth Status Co-Insurance    - KW       CMS - LX   22 visits total approved  Ref. ID  [id number]                                        2.  RE lymph   3.     lymph   4.   lymph   5. 5/   6.      7.    8.    9.    10.    11.    12.      13.    14.    15.    16.  17.  18.      19.    20.   21.   22.   23.   24.      25.    26. 27. 28. 29. 30.   31. 32.  33. 34. 35. 36.        Visit 1 2 3     24                         Neuro Re-Ed       Scap isos   Flex,  "abd, ext 15 x 5 sec each                                              Ther Ex       Table slides Flex, Abd Rev Flex, Abd  20x ea  Ball slides on table into flexion and abd x30 each    Supine HHA flex OH x10 x20 x30    SL ER x10 x20 Active x20    PROM KW 5' MR 10' KH x 10 min    Scap sq x10 5\" x20     Wand abd AAROM  X10      Wall slides    Into flexion x20           Ther Activity       HEP Access Code [email or web address]                                  Modalities                          "

## 2024-05-23 ENCOUNTER — OFFICE VISIT (OUTPATIENT)
Dept: PHYSICAL THERAPY | Facility: CLINIC | Age: 79
End: 2024-05-23
Payer: COMMERCIAL

## 2024-05-23 DIAGNOSIS — I89.0 LYMPHEDEMA OF LEFT ARM: Primary | ICD-10-CM

## 2024-05-23 DIAGNOSIS — Z85.3 HISTORY OF BREAST CANCER IN FEMALE: ICD-10-CM

## 2024-05-23 PROCEDURE — 97140 MANUAL THERAPY 1/> REGIONS: CPT | Performed by: PHYSICAL THERAPIST

## 2024-05-23 NOTE — PROGRESS NOTES
Daily Note     Today's date: 2024  Patient name: Eulalio Raphael  : 1945  MRN: [id number]  Referring provider: Tigre Kaufman DO  Dx:   Encounter Diagnosis     ICD-10-CM    1. Lymphedema of left arm  I89.0       2. History of breast cancer in female  Z85.3                 Start Time: 1130  Stop Time: 1225  Total time in clinic (min): 55 minutes    Subjective: Eulalio reports she is having some shoulder pain from her PT appointment yesterday.      Objective: See treatment diary below      Assessment: Tolerated treatment well.  MLD Sequence included:  diaphragmatic breathing, short neck sequence, superficial abdominal sequence, stimulation of bilateral axillary lymph nodes, L inguinal lymph nodes and interaxillary watershed.  Patient continues to tolerates MLD well.  Reduction garment was ordered for patient and should arrive within the next week.   Patient would benefit from continued PT      Plan: Continue per plan of care.  Progress treatment as tolerated.        Precautions: Precautions: Standard. Hx Breast cancer and m,mastectomy >17 years ago. L UE lymphedema. MD f/jorge 24.      Insurance:  AMA/CMS Eval/ Re-eval POC expires FOTO Auth Status Co-Insurance    - KW       CMS - LX   22 visits total approved  Ref. ID  [id number]                                        2.  RE lymph   3. 4    lymph   4. 4  lymph   5.   5/1 shoulder 6.   5/ lymph   7. ortho   8.  lymph   9.    10.    11.    12.      13.    14.    15.    16.  17.  18.      19.    20.   21.   22.   23.   24.      25.    26. 27. 28. 29. 30.   31. 32.  33. 34. 35. 36.        Compression Rx  5                      Manual Ther         volumetrics Done         MLD  40' total  X53 min x50 X53 min    Compression Bandaging  Tubigrip 10'        Wound Care             X10 garment measuring     Ther Ex         Remedial Exercise                                    Ther Activity & Self Care         Skin care          Garment Fit/Train  Done- reviewed with pt & daughter       Sleeping position                  Pt Education         Edema differential dx         Lymphatic A&P Done        Compression options Done

## 2024-05-28 ENCOUNTER — OFFICE VISIT (OUTPATIENT)
Dept: PHYSICAL THERAPY | Facility: CLINIC | Age: 79
End: 2024-05-28
Payer: COMMERCIAL

## 2024-05-28 DIAGNOSIS — S42.212D CLOSED DISPLACED FRACTURE OF SURGICAL NECK OF LEFT HUMERUS WITH ROUTINE HEALING, UNSPECIFIED FRACTURE MORPHOLOGY, SUBSEQUENT ENCOUNTER: ICD-10-CM

## 2024-05-28 DIAGNOSIS — Z96.612 STATUS POST REVERSE TOTAL REPLACEMENT OF LEFT SHOULDER: ICD-10-CM

## 2024-05-28 DIAGNOSIS — M25.512 ACUTE PAIN OF LEFT SHOULDER: Primary | ICD-10-CM

## 2024-05-28 PROCEDURE — 97110 THERAPEUTIC EXERCISES: CPT

## 2024-05-28 NOTE — PROGRESS NOTES
"Daily Note     Today's date: 2024  Patient name: Eulalio Raphael  : 1945  MRN: [id number]  Referring provider: Tigre Kaufman DO  Dx:   Encounter Diagnosis     ICD-10-CM    1. Acute pain of left shoulder  M25.512       2. Status post reverse total replacement of left shoulder  Z96.612       3. Closed displaced fracture of surgical neck of left humerus with routine healing, unspecified fracture morphology, subsequent encounter  S42.212D           Start Time: 830  Stop Time: 09  Total time in clinic (min): 30 minutes    Subjective: Patient states \"so far so good\". Patient arrived to 8 am appointment at 8:30.      Objective: See treatment diary below      Assessment: Tolerated treatment well. Decreased time in session due to patient's late arrival. Noted improvement in shoulder mobility after AAROM and isometric exercises. She continues to demonstrate limited shoulder abduction with compensatory patterns present. Patient demonstrated fatigue post treatment, exhibited good technique with therapeutic exercises, and would benefit from continued PT to improve mobility.       Plan: Continue per plan of care.       Precautions: Precautions: Standard. Hx Breast cancer and m,mastectomy >17 years ago. L UE lymphedema. MD f/u 24.      Insurance:  AMA/CMS Eval/ Re-eval POC expires FOTO Auth Status Co-Insurance    - KW       CMS - LX   22 visits total approved  Ref. ID  [id number]                                        2.  RE lymph   3. 4    lymph   4. 4  lymph   5.   5/ shoulder 6.   5/9 lymph   7. ortho   8.  lymph   9.    ortho 10.    11.    12.      13.    14.    15.    16.  17.  18.      19.    20.   21.   22.   23.   24.      25.    26. 27. 28. 29. 30.   31. 32.  33. 34. 35. 36.        Visit 1 2 3 4    24                        Neuro Re-Ed       Scap isos   Flex, abd, ext 15 x 5 sec each Flex, abd, ext 15 x 5 sec each                             " "                Ther Ex       Table slides Flex, Abd Rev Flex, Abd  20x ea  Ball slides on table into flexion and abd x30 each Ball slides on table into flexion and abd x30 each   Supine HHA flex OH x10 x20 x30 X30    SL ER x10 x20 Active x20 X20    PROM KW 5' MR 10' KH x 10 min MR 5 min    Scap sq x10 5\" x20  5\" x20    Wand abd AAROM  X10   X10    Wall slides    Into flexion x20           Ther Activity       HEP Access Code [email or web address]                                  Modalities                          "

## 2024-05-30 ENCOUNTER — OFFICE VISIT (OUTPATIENT)
Dept: PHYSICAL THERAPY | Facility: CLINIC | Age: 79
End: 2024-05-30
Payer: COMMERCIAL

## 2024-05-30 DIAGNOSIS — Z85.3 HISTORY OF BREAST CANCER IN FEMALE: ICD-10-CM

## 2024-05-30 DIAGNOSIS — I89.0 LYMPHEDEMA OF LEFT ARM: Primary | ICD-10-CM

## 2024-05-30 PROCEDURE — 97140 MANUAL THERAPY 1/> REGIONS: CPT | Performed by: PHYSICAL THERAPIST

## 2024-05-30 PROCEDURE — 97530 THERAPEUTIC ACTIVITIES: CPT | Performed by: PHYSICAL THERAPIST

## 2024-05-30 NOTE — PROGRESS NOTES
"Lymphedema Re-Evaluation    Today's Date: 2024  Patient name: Eulalio Raphael  : 1945  MRN: [id number]  Referring provider: Tigre Kaufman DO  Dx:  Encounter Diagnosis     ICD-10-CM    1. Lymphedema of left arm  I89.0       2. History of breast cancer in female  Z85.3                           Assessment/Plan  Eulalio Raphael  has completed 6 visits of PT for lymphedema since IE.  Since IE patient presents with 500 mL decrease in L UE volume.  Distal LE presenting with increased tissue softness and improved skin stretch compared with IE.  L UE volume is still significant and is impeding progress in shoulder strengthening due to weight of limb.  She will continue to benefit from CDT at this time.      Plan  Patient would benefit from: skilled physical therapy  Referral necessary: No  Planned therapy interventions: manual therapy, massage, muscle pump exercises, neuromuscular re-education, therapeutic activities, therapeutic exercise, stretching, strengthening, graded activity, functional ROM exercises, flexibility and compression  Frequency: 2x week- Care coordinated with PT Marleni Ramos- patient to be seen twice per week for PT- 1x/wk with PT Marleni Ramos for orthopedic management and 1x/wk with PT Debbie Kimbrough for swelling and lymphedema treatment, which is impacting her L UE healing and impairments.   Duration in weeks: 12  Plan of Care beginning date: 24  Plan of Care expiration date: 24  Treatment plan discussed with: patient       Subjective/History:  24:  \"My arm still feels really heavy, it is hard to lift.\"      Chief Complaint: L UE swelling  HPI: Per evaluation performed by PT Marleni Ramos on 24: \"Prior to surgery pt was experiencing lymphedema in L UE. She had a fall 2 months ago suffering from humerus fracture 24 and underwent reverse TSA 24. Pt had home health coming in. Pt still having stiffness but felt her home PT went well. Pain usually about 7/10 " "in her shoulder. Pt feel;s she has pain with all activity so is limited in mobility and functionality. She acknowledges she is performing about half of her ADLs currently. Pt is unable to meal prep and is having assistance form her daughter on a regular basis for self care. Occass taking tylenol. \"  Prior Edema Treatments:   Imaging: x-ray revealing closed nondisplaced fracture of L clavicle and L humeral fracture  Relevant PMHx: Pt has PMH of L breast cancer with subsequent L UE swelling   Personal history of Cancer? Yes; PMH L breast cancer; original diagnosis 17 years ago, 5 years later recurred and swelling present in L UE ever since. \"Removed all of them\" referring to lymph nodes. Bilateral mastectomy with lymph node transplant.     Lymphedema special questions  Feeling of heaviness, fullness, pressure? yes  Resolves with elevation: No  Sleeping location? In bed,   Change in fit of shoes/clothes/jewelry?yes  History of Cellulitis?  no  History of S/DVT? no    Systems Review:  Cardiovascular hx: Yes; HTN, diabetes  Thyroid dysfunction: Yes; papillary thyroid carcinoma  Diabetes: Yes  Kidney disease:No   Liver disease: No  Abdominal surgeries: Yes  Orthopedic injuries/surgeries: Yes; L reverse TSA to repair L humeral fracture after patient sustained a fall.     Pain: 6/10 current; 8/10 with movement  Function: Limited with making a meal, reaching, dressing, household chores  Working Status: Retired  Exercise status: N/A  Patient goals: \"Pt goal to be able to reach her arm up and hold her arm up so she can return to her typical daily tasks.\"      Objective  Lymphedema Exam: Upper Extremity  Hand dominance: R hand  Upper extremity Edema location and quality: left  Fingers: most swelling present at base of fingers  Dorsum of hand: Present  Palm: None  Wrist: Present  Forearm: Present  Elbow: Present  Upper arm: Present  Shoulder: Minimal  Lymphedema classification (0 latent, 1 reverse, 2 non-rev, 3 elephantiasis): " grade 2  >> REFER TO FLOW SHEET FOR GIRTH MEASUREMENTS <<  Axillary Web Syndrome? no    Skin Assessment:  Stemmer's sign? no  Fibrosis (0 normal - 4 >severe): grade 2  Scar present: yes; scar from reverse TSA demonstrates good healing, no bleeding or drainage  Surface anatomy changes: Yes  Bony prominences: Limited view of L styloid process in wrist  Tendon pathways: Limited view of tendons of L hand  Joint creases: Distorted L elbow crease and wrist crease    Functional Capacity:  Gait assessment: Patient independently walks with quad cane in R UE, no remarkable antalgias or deficits besides increased visual reliance when ambulating  Transfer status: able to independently supine <> sit and sit <> stand throughout evaluation  Ability to don/doff clothing/garments: Patient needs assist with L UE with donning jacket   Upper quarter Sensation: Normal  Upper quarter Range of Motion: Normal R UE, abnormal L UE secondary to precautions/ restrictions from reverse TSA  L wrist flexion/ extension: WFL  L elbow flexion/ extension AROM: 130 degrees flexion, full extension achieved  Upper Quarter Strength: Abnormal, L UE not tested per MD reverse TSA protocol  R shoulder flexion, abduction, ER/IR at side : 4/5 when tested in sitting  L elbow flexion/ extension: 3/5  L wrist flexion/ extension: 3/5  L shoulder MMT not tested secondary to protocol     UPPER EXTREMITY LEFT CALCULATIONS      Flowsheet Row Most Recent Value   Volume UE (mL) 4848   Difference from last visit (mL)  537   Difference from first visit (mL)  537   Difference from last visit (%)  10   Difference from first visit (%)  10                    Precautions: Precautions: Standard. Hx Breast cancer and m,mastectomy >17 years ago. L UE lymphedema. MD f/jorge 5/6/24.    Insurance:  AMA/CMS Eval/ Re-eval POC expires FOTO Auth Status Co-Insurance                                                4/11   2. 4/18 RE lymph   3. 4/22    lymph   4. 4/30  lymph   5.   5/1 shoulder  6.   5/9 lymph   7. ortho   8. 5/23 lymph   9.   5/28 ortho 10.   5/30 lymph 11.    12.      13.    14.    15.    16.  17.  18.      19.    20.   21.   22.   23.   24.      25.    26. 27. 28. 29. 30.   31. 32.  33. 34. 35. 36.          Compression Rx 4/18 4/22 4/30 5/9 5/23 5/30                     Manual Ther         volumetrics Done         MLD  40' total  X53 min x50 X53 min X35 min   Compression Bandaging  Tubigrip 10'        Wound Care             X10 garment measuring  X10 min volumetrics   Ther Ex         Remedial Exercise                                    Ther Activity & Self Care         Skin care         Garment Fit/Train  Done- reviewed with pt & daughter       Sleeping position                  Pt Education         Edema differential dx         Lymphatic A&P Done        Compression options Done

## 2024-06-03 ENCOUNTER — VBI (OUTPATIENT)
Dept: ADMINISTRATIVE | Facility: OTHER | Age: 79
End: 2024-06-03

## 2024-06-04 ENCOUNTER — OFFICE VISIT (OUTPATIENT)
Dept: PHYSICAL THERAPY | Facility: CLINIC | Age: 79
End: 2024-06-04
Payer: COMMERCIAL

## 2024-06-04 DIAGNOSIS — Z85.3 HISTORY OF BREAST CANCER IN FEMALE: ICD-10-CM

## 2024-06-04 DIAGNOSIS — Z96.612 STATUS POST REVERSE TOTAL REPLACEMENT OF LEFT SHOULDER: ICD-10-CM

## 2024-06-04 DIAGNOSIS — S42.212D CLOSED DISPLACED FRACTURE OF SURGICAL NECK OF LEFT HUMERUS WITH ROUTINE HEALING, UNSPECIFIED FRACTURE MORPHOLOGY, SUBSEQUENT ENCOUNTER: ICD-10-CM

## 2024-06-04 DIAGNOSIS — M25.512 ACUTE PAIN OF LEFT SHOULDER: Primary | ICD-10-CM

## 2024-06-04 DIAGNOSIS — I89.0 LYMPHEDEMA OF LEFT ARM: ICD-10-CM

## 2024-06-04 PROCEDURE — 97110 THERAPEUTIC EXERCISES: CPT

## 2024-06-04 PROCEDURE — 97112 NEUROMUSCULAR REEDUCATION: CPT

## 2024-06-04 NOTE — PROGRESS NOTES
Daily Note     Today's date: 2024  Patient name: Eulalio Raphael  : 1945  MRN: [id number]  Referring provider: Tigre Kaufman DO  Dx:   Encounter Diagnosis     ICD-10-CM    1. Acute pain of left shoulder  M25.512       2. Status post reverse total replacement of left shoulder  Z96.612       3. Closed displaced fracture of surgical neck of left humerus with routine healing, unspecified fracture morphology, subsequent encounter  S42.212D       4. Lymphedema of left arm  I89.0       5. History of breast cancer in female  Z85.3                      Subjective: Patient reports feeling pretty good today. She feels that she is gradually improving.       Objective: See treatment diary below      Assessment: Tolerated treatment well. Continued advancement of AAROM activities with transition to AROM activities. Patient struggled to achieve approx 60 deg shoulder flexion actively without significant shoulder hiking. Patient was appropriately challenged by theraband rows reporting fatigue, but no pain. Patient demonstrated fatigue post treatment, exhibited good technique with therapeutic exercises, and would benefit from continued PT to improve mobility.       Plan: Continue per plan of care.       Precautions: Precautions: Standard. Hx Breast cancer and m,mastectomy >17 years ago. L UE lymphedema. MD f/u 24.      Insurance:  AMA/CMS Eval/ Re-eval POC expires FOTO Auth Status Co-Insurance    - KW       CMS - LX   22 visits total approved  Ref. ID  [id number]                                        2.  RE lymph   3. 4    lymph   4. 4  lymph   5.   5/ shoulder 6.   5/9 lymph   7. ortho   8. 5 lymph   9.    ortho 10.    11.    12.      13.    14.    15.    16.  17.  18.      19.    20.   21.   22.   23.   24.      25.    26. 27. 28. 29. 30.   31. 32.  33. 34. 35. 36.        Visit 1 2 3 4 5    24                           Neuro Re-Ed        Scap isos    "Flex, abd, ext 15 x 5 sec each Flex, abd, ext 15 x 5 sec each Flex, abd, ext 20 x 5 sec each   Active flexion     2x10 in mirror   Rows      Gtb 3x10                                   Ther Ex        Table slides Flex, Abd Rev Flex, Abd  20x ea  Ball slides on table into flexion and abd x30 each Ball slides on table into flexion and abd x30 each Ball slides on table into flexion and abd x30 each   Supine HHA flex OH x10 x20 x30 X30  X30    SL ER x10 x20 Active x20 X20     PROM KW 5' MR 10' KH x 10 min MR 5 min  KH x 10 min   Scap sq x10 5\" x20  5\" x20  5\" x20    Wand abd AAROM  X10   X10     Wall slides    Into flexion x20             Ther Activity        HEP Access Code [email or web address]                                       Modalities                             "

## 2024-06-12 ENCOUNTER — OFFICE VISIT (OUTPATIENT)
Dept: PHYSICAL THERAPY | Facility: CLINIC | Age: 79
End: 2024-06-12
Payer: COMMERCIAL

## 2024-06-12 ENCOUNTER — APPOINTMENT (OUTPATIENT)
Dept: PHYSICAL THERAPY | Facility: CLINIC | Age: 79
End: 2024-06-12
Payer: COMMERCIAL

## 2024-06-12 DIAGNOSIS — Z96.612 STATUS POST REVERSE TOTAL REPLACEMENT OF LEFT SHOULDER: ICD-10-CM

## 2024-06-12 DIAGNOSIS — Z85.3 HISTORY OF BREAST CANCER IN FEMALE: ICD-10-CM

## 2024-06-12 DIAGNOSIS — I89.0 LYMPHEDEMA OF LEFT ARM: Primary | ICD-10-CM

## 2024-06-12 DIAGNOSIS — S42.212D CLOSED DISPLACED FRACTURE OF SURGICAL NECK OF LEFT HUMERUS WITH ROUTINE HEALING, UNSPECIFIED FRACTURE MORPHOLOGY, SUBSEQUENT ENCOUNTER: ICD-10-CM

## 2024-06-12 DIAGNOSIS — M25.512 ACUTE PAIN OF LEFT SHOULDER: ICD-10-CM

## 2024-06-12 PROCEDURE — 97140 MANUAL THERAPY 1/> REGIONS: CPT | Performed by: PHYSICAL THERAPIST

## 2024-06-12 NOTE — PROGRESS NOTES
"Daily Note     Today's date: 2024  Patient name: Eulalio Raphael  : 1945  MRN: [id number]  Referring provider: Tigre Kaufman DO  Dx:   Encounter Diagnosis     ICD-10-CM    1. Lymphedema of left arm  I89.0       2. History of breast cancer in female  Z85.3       3. Acute pain of left shoulder  M25.512       4. Status post reverse total replacement of left shoulder  Z96.612       5. Closed displaced fracture of surgical neck of left humerus with routine healing, unspecified fracture morphology, subsequent encounter  S42.212D                 Start Time: 1700  Stop Time: 1753  Total time in clinic (min): 53 minutes    Subjective: Eulalio reports \"arm is okay, if I move it a certain way, it hurts. Otherwise it's not too bad\"     Objective: See treatment diary below      Assessment: Tolerated treatment well.  MLD Sequence included:  diaphragmatic breathing, short neck sequence, superficial abdominal sequence, stimulation of bilateral axillary lymph nodes, L inguinal lymph nodes and interaxillary watershed.  Patient continues to tolerates MLD well. PT instructing patient to bring in reduction kit next visit to properly fit for patient with patient verbalizing understanding.  Patient would benefit from continued PT      Plan: Continue per plan of care.  Progress treatment as tolerated.        Precautions: Precautions: Standard. Hx Breast cancer and m,mastectomy >17 years ago. L UE lymphedema. MD f/jorge 24.      Insurance:  AMA/CMS Eval/ Re-eval POC expires FOTO Auth Status Co-Insurance    - KW       CMS - LX   22 visits total approved  Ref. ID  [id number]                                        2. 4 RE lymph   3. 4    lymph   4. 4  lymph   5.   5/1 shoulder 6.   5/9 lymph   7. ortho   8. 5 lymph   9. ortho   10.   530 RE lymph 11. ortho 12.   6/12 lymph   13.    14.    15.    16.  17.  18.      19.    20.   21.   22.   23.   24.      25.    26. 27. 28. 29. 30.   31. 32.  33. 34. 35. 36. "        Compression Rx 6/12 4/22 4/30 5/9 5/23 5/30                     Manual Ther         volumetrics       Done    MLD 53 min  40' total  X53 min x50 X53 min Done    Compression Bandaging  Tubigrip 10'        Wound Care             X10 garment measuring     Ther Ex         Remedial Exercise                                    Ther Activity & Self Care         Skin care         Garment Fit/Train  Done- reviewed with pt & daughter       Sleeping position                  Pt Education         Edema differential dx         Lymphatic A&P Done        Compression options Done

## 2024-06-13 ENCOUNTER — OFFICE VISIT (OUTPATIENT)
Dept: PHYSICAL THERAPY | Facility: CLINIC | Age: 79
End: 2024-06-13
Payer: COMMERCIAL

## 2024-06-13 DIAGNOSIS — Z96.612 STATUS POST REVERSE TOTAL REPLACEMENT OF LEFT SHOULDER: ICD-10-CM

## 2024-06-13 DIAGNOSIS — I89.0 LYMPHEDEMA OF LEFT ARM: ICD-10-CM

## 2024-06-13 DIAGNOSIS — S42.212D CLOSED DISPLACED FRACTURE OF SURGICAL NECK OF LEFT HUMERUS WITH ROUTINE HEALING, UNSPECIFIED FRACTURE MORPHOLOGY, SUBSEQUENT ENCOUNTER: Primary | ICD-10-CM

## 2024-06-13 DIAGNOSIS — Z85.3 HISTORY OF BREAST CANCER IN FEMALE: ICD-10-CM

## 2024-06-13 DIAGNOSIS — M25.512 ACUTE PAIN OF LEFT SHOULDER: ICD-10-CM

## 2024-06-13 PROCEDURE — 97112 NEUROMUSCULAR REEDUCATION: CPT

## 2024-06-13 PROCEDURE — 97110 THERAPEUTIC EXERCISES: CPT

## 2024-06-13 NOTE — PROGRESS NOTES
Daily Note     Today's date: 2024  Patient name: Eulalio Raphael  : 1945  MRN: [id number]  Referring provider: Tigre Kaufman DO  Dx:   Encounter Diagnosis     ICD-10-CM    1. Closed displaced fracture of surgical neck of left humerus with routine healing, unspecified fracture morphology, subsequent encounter  S42.212D       2. Acute pain of left shoulder  M25.512       3. Status post reverse total replacement of left shoulder  Z96.612       4. Lymphedema of left arm  I89.0       5. History of breast cancer in female  Z85.3                      Subjective: Patient provides no new complaints at this time. She is wearing full arm garment on LUE secondary to lymphedema.       Objective: See treatment diary below      Assessment: Tolerated treatment well. Patient continues to be limited in LUE AROM secondary to decreased strength. She requires verbal and tactile cueing to avoid shoulder hiking during flexion and abduction. Attempted ER AROM with GTB, but patient was unable to complete the exercises secondary to significant limitation in ER as well as difficulty understanding the task. Downgraded exercise to AAROM with a cane and tactile cueing to keep elbow to side, which helped patient understand the mechanics of the exercise. Provided new HEP with table slides in flexion and abduction as a reminder to practice those exercises daily. Patient demonstrated fatigue post treatment and would benefit from continued PT to improve shoulder mobility and strength to promote functional independence.       Plan: Continue per plan of care.  Progress treatment as tolerated.        Precautions: Precautions: Standard. Hx Breast cancer and m,mastectomy >17 years ago. L UE lymphedema. MD f/jorge 24.      Insurance:  AMA/CMS Eval/ Re-eval POC expires FOTO Auth Status Co-Insurance    - KW       CMS - LX   22 visits total approved  Ref. ID  [id number]                                        2.  RE lymph   3.  "4/22    lymph   4. 4/30  lymph   5.   5/1 shoulder 6.   5/9 lymph   7. ortho   8. 5/23 lymph   9. ortho   10.   5/30 RE lymph 11. ortho 12.   6/12 lymph   13.    14.    15.    16.  17.  18.      19.    20.   21.   22.   23.   24.      25.    26. 27. 28. 29. 30.   31. 32.  33. 34. 35. 36.        Visit 1 2 3 4 5 6    4/11/24 5/1/24 5/22/24 5/28/24 6/4/24 6/13/24                              Neuro Re-Ed         Scap isos   Flex, abd, ext 15 x 5 sec each Flex, abd, ext 15 x 5 sec each Flex, abd, ext 20 x 5 sec each Flex, abd, ext 20 x 5 sec each   Active flexion     2x10 in mirror 2x10 in mirror   Rows      Gtb 3x10 GTB 3x10                                       Ther Ex         Table slides Flex, Abd Rev Flex, Abd  20x ea  Ball slides on table into flexion and abd x30 each Ball slides on table into flexion and abd x30 each Ball slides on table into flexion and abd x30 each Ball slides on table into flexion and abd x30 each   Supine HHA flex OH x10 x20 x30 X30  X30  x30   SL ER x10 x20 Active x20 X20   Standing AROM 20x   PROM KW 5' MR 10' KH x 10 min MR 5 min  KH x 10 min MR 10min   Scap sq x10 5\" x20  5\" x20  5\" x20     Wand abd AAROM  X10   X10      Wall slides    Into flexion x20               Ther Activity         HEP Access Code [email or web address]                                            Modalities                             Access Code: [email or web address]  URL: [email or web address]  Date: 06/13/2024  Prepared by: Consuelo Méndez    Exercises  - Seated Shoulder Flexion Towel Slide at Table Top  - 1 x daily - 7 x weekly - 3 sets - 10 reps  - Seated Shoulder Abduction Towel Slide at Table Top  - 1 x daily - 7 x weekly - 3 sets - 10 reps     "

## 2024-06-25 ENCOUNTER — OFFICE VISIT (OUTPATIENT)
Dept: PHYSICAL THERAPY | Facility: CLINIC | Age: 79
End: 2024-06-25
Payer: COMMERCIAL

## 2024-06-25 DIAGNOSIS — I10 ESSENTIAL HYPERTENSION: ICD-10-CM

## 2024-06-25 DIAGNOSIS — Z96.612 STATUS POST REVERSE TOTAL REPLACEMENT OF LEFT SHOULDER: ICD-10-CM

## 2024-06-25 DIAGNOSIS — M25.512 ACUTE PAIN OF LEFT SHOULDER: ICD-10-CM

## 2024-06-25 DIAGNOSIS — E03.9 ACQUIRED HYPOTHYROIDISM: ICD-10-CM

## 2024-06-25 DIAGNOSIS — S42.212D CLOSED DISPLACED FRACTURE OF SURGICAL NECK OF LEFT HUMERUS WITH ROUTINE HEALING, UNSPECIFIED FRACTURE MORPHOLOGY, SUBSEQUENT ENCOUNTER: Primary | ICD-10-CM

## 2024-06-25 PROCEDURE — 97112 NEUROMUSCULAR REEDUCATION: CPT

## 2024-06-25 PROCEDURE — 97110 THERAPEUTIC EXERCISES: CPT

## 2024-06-25 RX ORDER — LEVOTHYROXINE SODIUM 0.03 MG/1
25 TABLET ORAL DAILY
Qty: 90 TABLET | Refills: 1 | Status: SHIPPED | OUTPATIENT
Start: 2024-06-25

## 2024-06-25 RX ORDER — AMLODIPINE BESYLATE 10 MG/1
TABLET ORAL
Qty: 90 TABLET | Refills: 1 | Status: SHIPPED | OUTPATIENT
Start: 2024-06-25

## 2024-06-25 NOTE — PROGRESS NOTES
"Daily Note     Today's date: 2024  Patient name: Eulalio Raphael  : 1945  MRN: [id number]  Referring provider: Tigre Kaufman DO  Dx:   Encounter Diagnosis     ICD-10-CM    1. Closed displaced fracture of surgical neck of left humerus with routine healing, unspecified fracture morphology, subsequent encounter  S42.212D       2. Acute pain of left shoulder  M25.512       3. Status post reverse total replacement of left shoulder  Z96.612                      Subjective: Patient does not report any new complaints. She had removed her lymphedema sleeve right before session, because she stated \"her arm needed to breathe\".       Objective: See treatment diary below      Assessment: Tolerated treatment well. She continues to be limited in AROM, specifically in flexion. When attempting AROM flexion in the mirror pt was unable to produce enough force to clear neutral without compensation. Instructed patient on AAROM shoulder flexion with eccentric lowering, which she tolerated much better. During ER, patient had difficult time keeping elbow in neutral. Added towel underarm for tactile cueing, which improved quality of movement. Patient demonstrated fatigue post treatment and would benefit from continued PT to promote functional mobility in left arm. Provided her with an HEP for AAROM shoulder flexion.      Plan: Continue per plan of care.       Precautions: Precautions: Standard. Hx Breast cancer and m,mastectomy >17 years ago. L UE lymphedema. MD f/jorge 24.      Insurance:  AMA/CMS Eval/ Re-eval POC expires FOTO Auth Status Co-Insurance    - KW       CMS - LX   22 visits total approved  Ref. ID  [id number]                                        2. 4 RE lymph   3. 4    lymph   4. 4  lymph   5.   5/1 shoulder 6.   5/9 lymph   7. ortho   8. 5 lymph   9. ortho   10.   5 RE lymph 11. ortho 12.   6/12 lymph   13. [place]    14. Ortho    15.    16.  17.  18.      19.    20.   21.   " "22.   23.   24.      25.    26. 27. 28. 29. 30.   31. 32.  33. 34. 35. 36.        Visit 1 2 3 4 5 6 7    4/11/24 5/1/24 5/22/24 5/28/24 6/4/24 6/13/24 6/25                                 Neuro Re-Ed          Scap isos   Flex, abd, ext 15 x 5 sec each Flex, abd, ext 15 x 5 sec each Flex, abd, ext 20 x 5 sec each Flex, abd, ext 20 x 5 sec each Flex, abd, ext 20 x 5 sec each   Active flexion     2x10 in mirror 2x10 in mirror AAROM cane 2x10     Eccentric lowering x12   Rows      Gtb 3x10 GTB 3x10 GTB 3x10   Shoulder ext        GTB 3x10                                 Ther Ex          Table slides Flex, Abd Rev Flex, Abd  20x ea  Ball slides on table into flexion and abd x30 each Ball slides on table into flexion and abd x30 each Ball slides on table into flexion and abd x30 each Ball slides on table into flexion and abd x30 each Ball slides on table into flexion and abd x30 each   Supine HHA flex OH x10 x20 x30 X30  X30  x30    SL ER x10 x20 Active x20 X20   Standing AROM 20x AROM 20x YTB   PROM KW 5' MR 10' KH x 10 min MR 5 min  KH x 10 min MR 10min MR 10 min   Scap sq x10 5\" x20  5\" x20  5\" x20   5\" x20   Wand abd AAROM  X10   X10       Wall slides    Into flexion x20    Into flex x20              Ther Activity          HEP Access Code [email or web address]                                                 Modalities                                Access Code: [email or web address]  URL: [email or web address]  Date: 06/13/2024  Prepared by: Consuelo Honey    Exercises  - Seated Shoulder Flexion Towel Slide at Table Top  - 1 x daily - 7 x weekly - 3 sets - 10 reps  - Seated Shoulder Abduction Towel Slide at Table Top  - 1 x daily - 7 x weekly - 3 sets - 10 reps       "

## 2024-06-26 ENCOUNTER — APPOINTMENT (OUTPATIENT)
Dept: PHYSICAL THERAPY | Facility: CLINIC | Age: 79
End: 2024-06-26
Payer: COMMERCIAL

## 2024-07-01 ENCOUNTER — OFFICE VISIT (OUTPATIENT)
Dept: PHYSICAL THERAPY | Facility: CLINIC | Age: 79
End: 2024-07-01
Payer: COMMERCIAL

## 2024-07-01 ENCOUNTER — APPOINTMENT (OUTPATIENT)
Dept: RADIOLOGY | Facility: AMBULARY SURGERY CENTER | Age: 79
End: 2024-07-01
Attending: STUDENT IN AN ORGANIZED HEALTH CARE EDUCATION/TRAINING PROGRAM
Payer: COMMERCIAL

## 2024-07-01 ENCOUNTER — OFFICE VISIT (OUTPATIENT)
Dept: OBGYN CLINIC | Facility: CLINIC | Age: 79
End: 2024-07-01
Payer: COMMERCIAL

## 2024-07-01 VITALS — WEIGHT: 136.2 LBS | HEIGHT: 62 IN | BODY MASS INDEX: 25.06 KG/M2

## 2024-07-01 DIAGNOSIS — S42.212D CLOSED DISPLACED FRACTURE OF SURGICAL NECK OF LEFT HUMERUS WITH ROUTINE HEALING, UNSPECIFIED FRACTURE MORPHOLOGY, SUBSEQUENT ENCOUNTER: ICD-10-CM

## 2024-07-01 DIAGNOSIS — M25.512 ACUTE PAIN OF LEFT SHOULDER: ICD-10-CM

## 2024-07-01 DIAGNOSIS — S42.212D CLOSED DISPLACED FRACTURE OF SURGICAL NECK OF LEFT HUMERUS WITH ROUTINE HEALING, UNSPECIFIED FRACTURE MORPHOLOGY, SUBSEQUENT ENCOUNTER: Primary | ICD-10-CM

## 2024-07-01 DIAGNOSIS — Z96.612 STATUS POST REVERSE TOTAL REPLACEMENT OF LEFT SHOULDER: ICD-10-CM

## 2024-07-01 PROCEDURE — 99213 OFFICE O/P EST LOW 20 MIN: CPT | Performed by: STUDENT IN AN ORGANIZED HEALTH CARE EDUCATION/TRAINING PROGRAM

## 2024-07-01 PROCEDURE — 73030 X-RAY EXAM OF SHOULDER: CPT

## 2024-07-01 PROCEDURE — 97112 NEUROMUSCULAR REEDUCATION: CPT

## 2024-07-01 PROCEDURE — 97110 THERAPEUTIC EXERCISES: CPT

## 2024-07-01 NOTE — PROGRESS NOTES
Orthopaedics Office Visit - New Patient Visit    ASSESSMENT/PLAN:    Assessment:   Left shoulder reverse total shoulder arthroplasty for proximal humerus fracture, DOS: 2/1/2024    Plan:   Radiograph reviewed with patient and family member displaying well-maintained alignment of the patient's left reverse total shoulder arthroplasty with no signs of loosening or failure.  The tuberosities are still well-maintained and there is heterotopic ossification formation   Recommended the patient continue with physical therapy and home stretching exercises to try to loosen up the soft tissues surrounding her shoulder to help with stiffness.  Home exercise were discussed and demonstrated for the patient today   range of motion as tolerated  No lifting more than 10 pounds  Continue outpatient physical therapy for ROM and strengthening. New prescription provided today.  Patient will follow-up in 12 weeks for repeat x-ray evaluation and repeat range of motion check  Continue with the [facility] rehabilitation program for reverse total shoulder arthroplasties with fracture.    To Do Next Visit:    _____________________________________________________  CHIEF COMPLAINT:  Chief Complaint   Patient presents with    Left Shoulder - Post-op         SUBJECTIVE:  Eulalio Raphael is a 78 y.o. female who presents 2 weeks s/p left shoulder reverse total shoulder arthroplasty, 2/1/2024.  She is here with female family member.  Today she complains of left generalized shoulder pain.  She has enrico compliant with immobilizer shoulder sling.  She does use Tylenol and oxycodone for pain.   She does use 4-point cane since her injury.  Patient overall states that her pain is significantly improved from her injury.  Denies any numbness or paresthesias in the left upper extremity    Interval history 3/25/2024  She is 8 weeks status post reverse total shoulder arthroplasty for proximal humerus fracture, DOS 2/1/2024. Today she denies any pain at rest.  She has some pain after doing PT.  She takes Tylenol occasionally for pain.  She has been using her sling and coming out of it for range of motion exercises.  She is doing home physical therapy according to the [facility] protocol. She denies any numbness or tingling in the left upper extremity.     Interval history 5/6/2024  She is 3 months out from her left reverse total shoulder arthroplasty for her proximal humerus fracture. DOS 2/1/2024. She reports mild laterally based pain on the left shoulder that increases with range of motion exercises. She has been doing physical therapy once a week for range of motion and feels like she should do twice a week to increase her ROM.  She asked breast the physical therapy exercises she has been performing with physical therapy and most of these exercises were directed at increasing her mobility and function with her elbow range of motion.  She denies new injury or fall. She denies new numbness or tingling     Interval history 7/1/2024:    The patient presents 5 months s/p reverse total shoulder arthroplasty s/p proximal humerus fracture, 2/1/2024.  She is doing well and progressing.  The patient states that she has little to no pain at rest and with gentle activities.  She takes Tylenol occasionally and sparingly for discomfort.  She does use Voltaren gel and rest with benefit.   She continues physical therapy with benefit.  Her lymphedema is stable in the left upper extremity.  The lymphedema in her left upper extremity is stable.  Patient is happy with her progress.  She denies new injury or fall. She denies new numbness or tingling       PAST MEDICAL HISTORY:  Past Medical History:   Diagnosis Date    Acute encephalopathy     Breast cancer (HCC)     Hypertension        PAST SURGICAL HISTORY:  Past Surgical History:   Procedure Laterality Date    HYSTERECTOMY      MASTECTOMY MODIFIED RADICAL Bilateral     REVERSE TOTAL SHOULDER ARTHROPLASTY Left 2/1/2024    Procedure:  ARTHROPLASTY SHOULDER REVERSE, and all associated procedures;  Surgeon: Tigre Kaufman DO;  Location: [facility];  Service: Orthopedics    SPINE SURGERY      THYROIDECTOMY N/A 6/21/2023    Procedure: LEFT HEMITHYROIDECTOMY, SUBSTERNAL;  Surgeon: Leonel James MD;  Location: [facility];  Service: ENT       FAMILY HISTORY:  Family History   Problem Relation Age of Onset    Hypertension Father     Hypertension Sister     Hypertension Brother        SOCIAL HISTORY:  Social History     Tobacco Use    Smoking status: Never     Passive exposure: Never    Smokeless tobacco: Never   Vaping Use    Vaping status: Never Used   Substance Use Topics    Alcohol use: Yes     Alcohol/week: 1.0 standard drink of alcohol     Types: 1 Glasses of wine per week    Drug use: Never       MEDICATIONS:    Current Outpatient Medications:     acetaminophen (TYLENOL) 325 mg tablet, Take 3 tablets (975 mg total) by mouth every 8 (eight) hours as needed for mild pain, Disp: , Rfl:     amLODIPine (NORVASC) 10 mg tablet, TAKE 1 TABLET BY MOUTH EVERY DAY, Disp: 90 tablet, Rfl: 1    Diclofenac Sodium (VOLTAREN) 1 %, Apply 2 g topically 4 (four) times a day, Disp: 150 g, Rfl: 3    levothyroxine 25 mcg tablet, TAKE 1 TABLET BY MOUTH EVERY DAY, Disp: 90 tablet, Rfl: 1    lidocaine (LIDODERM) 5 %, Apply 1 patch topically over 12 hours daily Remove & Discard patch within 12 hours or as directed by MD, Disp: 15 patch, Rfl: 0    metFORMIN (GLUCOPHAGE-XR) 500 mg 24 hr tablet, Take 2 tablets (1,000 mg total) by mouth daily with breakfast, Disp: 180 tablet, Rfl: 1    Multiple Vitamin (multivitamin) capsule, Take 1 capsule by mouth daily, Disp: , Rfl:     PARoxetine (PAXIL) 20 mg tablet, TAKE 1 TABLET BY MOUTH EVERY DAY, Disp: 90 tablet, Rfl: 1    ALLERGIES:  Allergies   Allergen Reactions    Ace Inhibitors Angioedema    Erythromycin Swelling       REVIEW OF SYSTEMS:  MSK: Left shoulder pain  Neuro: No numbness or paresthesias  Pertinent items are  "otherwise noted in HPI.  A comprehensive review of systems was otherwise negative.    LABS:  HgA1c:   Lab Results   Component Value Date    HGBA1C 6.1 02/07/2024     BMP:   Lab Results   Component Value Date    CALCIUM 9.9 04/10/2024    K 4.2 04/10/2024    CO2 28 04/10/2024     04/10/2024    BUN 13 04/10/2024    CREATININE 0.73 04/10/2024     CBC: No components found for: \"CBC\"    _____________________________________________________  PHYSICAL EXAMINATION:  Vital signs: Ht 5' 2\" (1.575 m)   Wt 61.8 kg (136 lb 3.2 oz)   BMI 24.91 kg/m²   General: No acute distress, awake and alert  Psychiatric: Mood and affect appear appropriate  HEENT: Trachea Midline, No torticollis, no apparent facial trauma  Cardiovascular: No audible murmurs; Extremities appear perfused  Pulmonary: No audible wheezing or stridor  Skin: No open lesions; see further details (if any) below    MUSCULOSKELETAL EXAMINATION:  Extremities:    Left shoulder:  The left shoulder was exposed inspected.  The patient's surgical incision is healed well without any surrounding erythema or dehiscence. Significant lymphedema in LUE which is chronic.  There is firmness to the left shoulder about the deltoid and along the incision consistent with heterotopic ossification. She is mildly tender over the shoulder laterally.  Passive range of motion was performed on the shoulder to 100 degrees of forward flexion, 100 degrees of abduction,20 degrees of external rotation, internal rotation to side pocket.  Active range of motion is similar with approximately 90 degrees of abduction and 90 degrees of forward flexion.  No discomfort throughout range of motion.   Patient's sensation is intact to light touch in the axillary, median, radial, ulnar nerve distributions.  AIN, PIN, ulnar, axillary nerves are intact.  +2 radial pulses distally      _____________________________________________________  STUDIES REVIEWED:  I personally reviewed the images and interpretation " is as follows:  Left shoulder x-ray demonstrate: Well-maintained alignment of the patient's left reverse total shoulder arthroplasty with no signs of loosening or hardware failure.  Tuberosities are healed adjacent to the proximal end of the implant demonstrating appropriate position. There is heterotopic ossification around the implant which is matured    PROCEDURES PERFORMED:  Procedures none    [place] Hubbard

## 2024-07-01 NOTE — PROGRESS NOTES
Daily Note     Today's date: 2024  Patient name: Eulalio Raphael  : 1945  MRN: [id number]  Referring provider: Tigre Kaufman DO  Dx:   Encounter Diagnosis     ICD-10-CM    1. Closed displaced fracture of surgical neck of left humerus with routine healing, unspecified fracture morphology, subsequent encounter  S42.212D       2. Acute pain of left shoulder  M25.512       3. Status post reverse total replacement of left shoulder  Z96.612                      Subjective: Patient provides no new complaints at this time. She is currently not wearing lymphedema sleeve.      Objective: See treatment diary below      Assessment: Tolerated treatment fair. Patient continues to demo difficulty with AROM on left arm secondary to increased weight from lymphedema. She is able to actively flex the arm approximately 30 degrees before compensation of the UT and scapular elevation. She also had difficulty performing AAROM overhead including standing cane and wall slides, but she is able to lift overhead with the pulleys. Added towel around elbows to improve shoulder ER and avoid compensations. This helped her keep neurtal scapula and isolate the shoulder external rotators. Patient demonstrated fatigue post treatment and would benefit from continued PT to improve independence with functional mobility of the left arm.      Plan: Continue per plan of care.      Precautions: Precautions: Standard. Hx Breast cancer and m,mastectomy >17 years ago. L UE lymphedema. MD f/u 24.      Insurance:  AMA/CMS Eval/ Re-eval POC expires FOTO Auth Status Co-Insurance    - KW       CMS - LX   22 visits total approved  Ref. ID  [id number]                                        2. 4 RE lymph   3. 4    lymph   4.   lymph   5.   5/ shoulder 6.   5/9 lymph   7. ortho   8. 5 lymph   9. ortho   10.    RE lymph 11. ortho 12.   6/12 lymph   13. [place]    14. Ortho    15.    16.  17.  18.      19.    20.   21.    "22.   23.   24.      25.    26. 27. 28. 29. 30.   31. 32.  33. 34. 35. 36.        Visit 1 2 3 4 5 6 7 8    4/11/24 5/1/24 5/22/24 5/28/24 6/4/24 6/13/24 6/25 7/1                                    Neuro Re-Ed           Scap isos   Flex, abd, ext 15 x 5 sec each Flex, abd, ext 15 x 5 sec each Flex, abd, ext 20 x 5 sec each Flex, abd, ext 20 x 5 sec each Flex, abd, ext 20 x 5 sec each Flex, abd, ext 20 x 5 sec each   Active flexion     2x10 in mirror 2x10 in mirror AAROM cane 2x10     Eccentric lowering x12 AAROM cane 2x10    Ecc x15   Rows      Gtb 3x10 GTB 3x10 GTB 3x10 GTB  3x10   Shoulder ext        GTB 3x10 GTB 3x10                                     Ther Ex           Table slides Flex, Abd Rev Flex, Abd  20x ea  Ball slides on table into flexion and abd x30 each Ball slides on table into flexion and abd x30 each Ball slides on table into flexion and abd x30 each Ball slides on table into flexion and abd x30 each Ball slides on table into flexion and abd x30 each Ball slides on table into flexion and abd x30 each   Supine HHA flex OH x10 x20 x30 X30  X30  x30     SL ER x10 x20 Active x20 X20   Standing AROM 20x AROM 20x YTB AROM 20x YTB   PROM KW 5' MR 10' KH x 10 min MR 5 min  KH x 10 min MR 10min MR 10 min MR 8 min    Scap sq x10 5\" x20  5\" x20  5\" x20   5\" x20 5\" x20   Wand abd AAROM  X10   X10     X20    Wall slides    Into flexion x20    Into flex x20  Attempted single arm     Pulleys 20x              Ther Activity           HEP Access Code [email or web address]                                                      Modalities                                   Access Code: [email or web address]  URL: [email or web address]  Date: 06/13/2024  Prepared by: Consuelo Méndez    Exercises  - Seated Shoulder Flexion Towel Slide at Table Top  - 1 x daily - 7 x weekly - 3 sets - 10 reps  - Seated Shoulder Abduction Towel Slide at Table Top  - 1 x daily - 7 x weekly - 3 sets - 10 reps         "

## 2024-07-02 DIAGNOSIS — F41.1 GENERALIZED ANXIETY DISORDER: ICD-10-CM

## 2024-07-02 RX ORDER — PAROXETINE HYDROCHLORIDE 20 MG/1
20 TABLET, FILM COATED ORAL DAILY
Qty: 90 TABLET | Refills: 1 | Status: SHIPPED | OUTPATIENT
Start: 2024-07-02

## 2024-07-09 ENCOUNTER — OFFICE VISIT (OUTPATIENT)
Dept: PHYSICAL THERAPY | Facility: CLINIC | Age: 79
End: 2024-07-09
Payer: COMMERCIAL

## 2024-07-09 DIAGNOSIS — S42.212D CLOSED DISPLACED FRACTURE OF SURGICAL NECK OF LEFT HUMERUS WITH ROUTINE HEALING, UNSPECIFIED FRACTURE MORPHOLOGY, SUBSEQUENT ENCOUNTER: Primary | ICD-10-CM

## 2024-07-09 DIAGNOSIS — Z96.612 STATUS POST REVERSE TOTAL REPLACEMENT OF LEFT SHOULDER: ICD-10-CM

## 2024-07-09 DIAGNOSIS — M25.512 ACUTE PAIN OF LEFT SHOULDER: ICD-10-CM

## 2024-07-09 PROCEDURE — 97110 THERAPEUTIC EXERCISES: CPT

## 2024-07-09 PROCEDURE — 97112 NEUROMUSCULAR REEDUCATION: CPT

## 2024-07-09 NOTE — PROGRESS NOTES
Daily Note     Today's date: 2024  Patient name: Eulalio Raphael  : 1945  MRN: [id number]  Referring provider: Tigre Kaufman DO  Dx:   Encounter Diagnosis     ICD-10-CM    1. Closed displaced fracture of surgical neck of left humerus with routine healing, unspecified fracture morphology, subsequent encounter  S42.212D       2. Acute pain of left shoulder  M25.512       3. Status post reverse total replacement of left shoulder  Z96.612                      Subjective: Patient reports no new complaints at this time.      Objective: See treatment diary below      Assessment: Tolerated treatment fair. She continues to demo difficulty with AROM shoulder flexion, abduction, and ER. She is able to perform the movements with AAROM and cueing to keep shoulder blades depressed to prevent UT compensation. Patient demonstrated fatigue post treatment, exhibited good technique with therapeutic exercises, and would benefit from continued PT to reduce limitation and maximize functional mobility of the shoulder.      Plan: Progress note during next visit.      Precautions: Precautions: Standard. Hx Breast cancer and m,mastectomy >17 years ago. L UE lymphedema. MD f/u 24.      Insurance:  AMA/CMS Eval/ Re-eval POC expires FOTO Auth Status Co-Insurance    - KW       CMS - LX   22 visits total approved  Ref. ID  [id number]                                        2.  RE lymph   3. 4    lymph   4.   lymph   5.   5/ shoulder 6.   5 lymph   7. ortho   8.  lymph   9. ortho   10.    RE lymph 11. ortho 12.   6 lymph   13. [place]    14. Ortho    15.    16.  17.  18.      19.    20.   21.   22.   23.   24.      25.    26. 27. 28. 29. 30.   31. 32.  33. 34. 35. 36.        Visit 1 2 3 4 5 6 7 8 9    24                                       Neuro Re-Ed            Scap isos   Flex, abd, ext 15 x 5 sec each Flex, abd, ext 15 x 5 sec  "each Flex, abd, ext 20 x 5 sec each Flex, abd, ext 20 x 5 sec each Flex, abd, ext 20 x 5 sec each Flex, abd, ext 20 x 5 sec each    Active flexion     2x10 in mirror 2x10 in mirror AAROM cane 2x10     Eccentric lowering x12 AAROM cane 2x10    Ecc x15 Ecc 25x    Rows      Gtb 3x10 GTB 3x10 GTB 3x10 GTB  3x10    Shoulder ext        GTB 3x10 GTB 3x10                                         Ther Ex            Table slides Flex, Abd Rev Flex, Abd  20x ea  Ball slides on table into flexion and abd x30 each Ball slides on table into flexion and abd x30 each Ball slides on table into flexion and abd x30 each Ball slides on table into flexion and abd x30 each Ball slides on table into flexion and abd x30 each Ball slides on table into flexion and abd x30 each Ball slides on table into flexion and abd x30 each   Supine HHA flex OH x10 x20 x30 X30  X30  x30   Cane 30x    SL ER x10 x20 Active x20 X20   Standing AROM 20x AROM 20x YTB AROM 20x YTB AROM 20x ytb    PROM KW 5' MR 10' KH x 10 min MR 5 min  KH x 10 min MR 10min MR 10 min MR 8 min  MR 8 min    Scap sq x10 5\" x20  5\" x20  5\" x20   5\" x20 5\" x20 5\"x20   Wand abd AAROM  X10   X10     X20     Wall slides    Into flexion x20    Into flex x20  Attempted single arm     Pulleys 20x Pulleys 20x                Ther Activity            HEP Access Code [email or web address]                                                           Modalities                                      Access Code: [email or web address]  URL: [email or web address]  Date: 06/13/2024  Prepared by: Consuelo Honey    Exercises  - Seated Shoulder Flexion Towel Slide at Table Top  - 1 x daily - 7 x weekly - 3 sets - 10 reps  - Seated Shoulder Abduction Towel Slide at Table Top  - 1 x daily - 7 x weekly - 3 sets - 10 reps           "

## 2024-07-11 ENCOUNTER — OFFICE VISIT (OUTPATIENT)
Dept: PHYSICAL THERAPY | Facility: CLINIC | Age: 79
End: 2024-07-11
Payer: COMMERCIAL

## 2024-07-11 DIAGNOSIS — I89.0 LYMPHEDEMA OF LEFT ARM: ICD-10-CM

## 2024-07-11 DIAGNOSIS — Z85.3 HISTORY OF BREAST CANCER IN FEMALE: Primary | ICD-10-CM

## 2024-07-11 PROCEDURE — 97140 MANUAL THERAPY 1/> REGIONS: CPT | Performed by: PHYSICAL THERAPIST

## 2024-07-11 NOTE — PROGRESS NOTES
Daily Note     Today's date: 2024  Patient name: Eulalio Raphael  : 1945  MRN: [id number]  Referring provider: Tigre Kaufman DO  Dx:   Encounter Diagnosis     ICD-10-CM    1. History of breast cancer in female  Z85.3       2. Lymphedema of left arm  I89.0                              Subjective: Eulalio reports her arm is doing okay.  She notes it is still very heavy.      Objective: See treatment diary below      Assessment: Tolerated treatment well.  MLD Sequence included:  diaphragmatic breathing, short neck sequence, superficial abdominal sequence, stimulation of bilateral axillary lymph nodes, L inguinal lymph nodes and interaxillary watershed.  Patient received L UE reduction garment and was fitted for it during today's session.  Patient and her daughter were trained regarding how to don and during of wear.  Both voice understanding of education.  Patient would benefit from continued PT      Plan: Continue per plan of care.  Progress treatment as tolerated.        Precautions: Precautions: Standard. Hx Breast cancer and m,mastectomy >17 years ago. L UE lymphedema. MD f/jorge 24.      Insurance:  AMA/CMS Eval/ Re-eval POC expires FOTO Auth Status Co-Insurance    - KW       CMS - LX   22 visits total approved  Ref. ID  [id number]                                        2. 4 RE lymph   3. 4    lymph   4. 4  lymph   5.   5/1 shoulder 6.   5/ lymph   7. ortho   8.  lymph   9. ortho   10.   530 RE lymph 11. ortho 12.   6/12 lymph   13. 6/13 ortho   14. 6/25 ortho   15. 7/1 ortho 16. 7/8 lymph 17. 7/11 lymph 18.      19.    20.   21.   22.   23.   24.      25.    26. 27. 28. 29. 30.   31. 32.  33. 34. 35. 36.        Compression Rx /                      Manual Ther         volumetrics       Done    MLD 53 min  25 min X53 min x50 X53 min Done    Compression Bandaging         Wound Care           Garment fitting x 15 min  X10 garment measuring     Ther  Ex         Remedial Exercise                                    Ther Activity & Self Care         Skin care         Garment Fit/Train         Sleeping position                  Pt Education         Edema differential dx         Lymphatic A&P Done        Compression options Done

## 2024-07-24 ENCOUNTER — OFFICE VISIT (OUTPATIENT)
Dept: PHYSICAL THERAPY | Facility: CLINIC | Age: 79
End: 2024-07-24
Payer: COMMERCIAL

## 2024-07-24 DIAGNOSIS — Z96.612 STATUS POST REVERSE TOTAL REPLACEMENT OF LEFT SHOULDER: ICD-10-CM

## 2024-07-24 DIAGNOSIS — S42.212D CLOSED DISPLACED FRACTURE OF SURGICAL NECK OF LEFT HUMERUS WITH ROUTINE HEALING, UNSPECIFIED FRACTURE MORPHOLOGY, SUBSEQUENT ENCOUNTER: Primary | ICD-10-CM

## 2024-07-24 DIAGNOSIS — M25.512 ACUTE PAIN OF LEFT SHOULDER: ICD-10-CM

## 2024-07-24 PROCEDURE — 97110 THERAPEUTIC EXERCISES: CPT

## 2024-07-24 PROCEDURE — 97112 NEUROMUSCULAR REEDUCATION: CPT

## 2024-07-24 NOTE — PROGRESS NOTES
Daily Note     Today's date: 2024  Patient name: Eulalio Raphael  : 1945  MRN: [id number]  Referring provider: Tigre Kaufman DO  Dx:   Encounter Diagnosis     ICD-10-CM    1. Closed displaced fracture of surgical neck of left humerus with routine healing, unspecified fracture morphology, subsequent encounter  S42.212D       2. Acute pain of left shoulder  M25.512       3. Status post reverse total replacement of left shoulder  Z96.612                        Subjective: Patient  note improved mobility, but continued levels of pain. She does not some improvements overall since beginning PT.       Objective: See treatment diary below      Assessment: Tolerated treatment fair. Patient demonstrates gradual improvements in active assisted and passive ROM, but she continues to be limited actively. Trailed horizontal abd which patient was able to complete without reports of pain. Plan to reassess formally next session. Patient demonstrated fatigue post treatment, exhibited good technique with therapeutic exercises, and would benefit from continued PT to reduce limitation and maximize functional mobility of the shoulder.      Plan: RE with primary PT next session.        Precautions: Precautions: Standard. Hx Breast cancer and m,mastectomy >17 years ago. L UE lymphedema. MD f/u 24.      Insurance:  AMA/CMS Eval/ Re-eval POC expires FOTO Auth Status Co-Insurance    - KW       CMS - LX   22 visits total approved  Ref. ID  [id number]                                        2.  RE lymph   3.     lymph   4.   lymph   5.   5/ shoulder 6.    lymph   7. ortho   8.  lymph   9. ortho   10.    RE lymph 11. ortho 12.   6 lymph   13. [place]    14. Ortho    15.    16.  17.  18.      19.    20.   21.   22.   23.   24.      25.    26. 27. 28. 29. 30.   31. 32.  33. 34. 35. 36.        Visit 3 4 5 6 7 8 9 10    24                        "             Neuro Re-Ed           Scap isos Flex, abd, ext 15 x 5 sec each Flex, abd, ext 15 x 5 sec each Flex, abd, ext 20 x 5 sec each Flex, abd, ext 20 x 5 sec each Flex, abd, ext 20 x 5 sec each Flex, abd, ext 20 x 5 sec each     Active flexion   2x10 in mirror 2x10 in mirror AAROM cane 2x10     Eccentric lowering x12 AAROM cane 2x10    Ecc x15 Ecc 25x  Ecc 3x10   Rows    Gtb 3x10 GTB 3x10 GTB 3x10 GTB  3x10     Shoulder ext      GTB 3x10 GTB 3x10                                       Ther Ex           Table slides Ball slides on table into flexion and abd x30 each Ball slides on table into flexion and abd x30 each Ball slides on table into flexion and abd x30 each Ball slides on table into flexion and abd x30 each Ball slides on table into flexion and abd x30 each Ball slides on table into flexion and abd x30 each Ball slides on table into flexion and abd x30 each Ball slides on table into flexion and abd x30 each   Supine HHA flex OH x30 X30  X30  x30   Cane 30x  Cane 30x    SL ER Active x20 X20   Standing AROM 20x AROM 20x YTB AROM 20x YTB AROM 20x ytb  AROM 20x ytb    PROM KH x 10 min MR 5 min  KH x 10 min MR 10min MR 10 min MR 8 min  MR 8 min  KH x 10 min   Scap sq  5\" x20  5\" x20   5\" x20 5\" x20 5\"x20    Wand abd AAROM  X10     X20      Wall slides  Into flexion x20    Into flex x20  Attempted single arm     Pulleys 20x Pulleys 20x     Supine horizontal abd        YTB 2x10              Ther Activity           HEP                                                       Modalities                                   Access Code: [email or web address]  URL: [email or web address]  Date: 06/13/2024  Prepared by: Consuelo Honey    Exercises  - Seated Shoulder Flexion Towel Slide at Table Top  - 1 x daily - 7 x weekly - 3 sets - 10 reps  - Seated Shoulder Abduction Towel Slide at Table Top  - 1 x daily - 7 x weekly - 3 sets - 10 reps           "

## 2024-07-25 ENCOUNTER — OFFICE VISIT (OUTPATIENT)
Dept: URGENT CARE | Facility: CLINIC | Age: 79
End: 2024-07-25
Payer: COMMERCIAL

## 2024-07-25 VITALS
OXYGEN SATURATION: 100 % | SYSTOLIC BLOOD PRESSURE: 140 MMHG | HEART RATE: 44 BPM | TEMPERATURE: 97 F | RESPIRATION RATE: 14 BRPM | WEIGHT: 141.2 LBS | DIASTOLIC BLOOD PRESSURE: 85 MMHG | BODY MASS INDEX: 25.83 KG/M2

## 2024-07-25 DIAGNOSIS — R00.1 BRADYCARDIA: ICD-10-CM

## 2024-07-25 DIAGNOSIS — M10.9 GOUTY ARTHRITIS OF LEFT GREAT TOE: Primary | ICD-10-CM

## 2024-07-25 LAB
ATRIAL RATE: 42 BPM
ATRIAL RATE: 42 BPM
ATRIAL RATE: 44 BPM
ATRIAL RATE: 45 BPM
ATRIAL RATE: 45 BPM
P AXIS: 48 DEGREES
P AXIS: 48 DEGREES
P AXIS: 50 DEGREES
P AXIS: 56 DEGREES
P AXIS: 57 DEGREES
PR INTERVAL: 204 MS
PR INTERVAL: 204 MS
PR INTERVAL: 206 MS
PR INTERVAL: 208 MS
PR INTERVAL: 216 MS
QRS AXIS: 28 DEGREES
QRS AXIS: 29 DEGREES
QRS AXIS: 30 DEGREES
QRS AXIS: 30 DEGREES
QRSD INTERVAL: 84 MS
QRSD INTERVAL: 86 MS
QT INTERVAL: 466 MS
QT INTERVAL: 468 MS
QT INTERVAL: 468 MS
QT INTERVAL: 472 MS
QTC INTERVAL: 390 MS
QTC INTERVAL: 390 MS
QTC INTERVAL: 403 MS
QTC INTERVAL: 408 MS
T WAVE AXIS: 36 DEGREES
T WAVE AXIS: 37 DEGREES
T WAVE AXIS: 38 DEGREES
T WAVE AXIS: 38 DEGREES
T WAVE AXIS: 43 DEGREES
VENTRICULAR RATE: 42 BPM
VENTRICULAR RATE: 42 BPM
VENTRICULAR RATE: 44 BPM
VENTRICULAR RATE: 45 BPM
VENTRICULAR RATE: 45 BPM

## 2024-07-25 PROCEDURE — 99203 OFFICE O/P NEW LOW 30 MIN: CPT | Performed by: PHYSICIAN ASSISTANT

## 2024-07-25 PROCEDURE — 93000 ELECTROCARDIOGRAM COMPLETE: CPT | Performed by: PHYSICIAN ASSISTANT

## 2024-07-25 PROCEDURE — 93010 ELECTROCARDIOGRAM REPORT: CPT | Performed by: INTERNAL MEDICINE

## 2024-07-25 RX ORDER — COLCHICINE 0.6 MG/1
TABLET ORAL
Qty: 3 TABLET | Refills: 0 | Status: SHIPPED | OUTPATIENT
Start: 2024-07-25

## 2024-07-25 NOTE — PROGRESS NOTES
[facility]        NAME: Eulalio Raphael is a 78 y.o. female  : 1945    MRN: [id number]  DATE: 2024  TIME: 12:16 PM    Assessment and Plan   Gouty arthritis of left great toe [M10.9]  1. Gouty arthritis of left great toe  colchicine (COLCRYS) 0.6 mg tablet      2. Bradycardia  POCT ECG    Ambulatory Referral to Cardiology        Prescribed colchicine for acute gout attack, avoid naproxen with medication. Advil, ibuprofen, and tylenol are okay to take   Recommended follow-up with PCP for maintenance medications to prevent another attack  Discussed red flag symptoms and when to go to the ER with patient     Patient is experiencing asymptomatic bradycardia in the office   EKG showed first degree heart block   Placed Cardiology referral for further evaluation of bradycardia   Discussed strict return to care precautions as well as red flag symptoms which should prompt immediate ED referral. Pt verbalized understanding and is in agreement with plan.  Please follow up with your primary care provider within the next week. Please remember that your visit today was with an urgent care provider and should not replace follow up with your primary care provider for chronic medical issues or annual physicals.         Patient Instructions       Follow up with PCP in 3-5 days.  Proceed to  ER if symptoms worsen.    Chief Complaint     Chief Complaint   Patient presents with    Foot Pain     Left foot pain x 3 days to left foot big toe at the joint.  Denies acute injury and trauma.  Redness and swelling noted.          History of Present Illness       Eulalio Raphael is a 78 year old female with hypertension, hyperlipidemia, Type 2 diabetes, and papillary thyroid cancer presenting to the urgent care for left great toe swelling and pain x 3 days. She states that 3 days ago she noticed the pain and took tylenol to help her sleep. She states that the pain and swelling are accompanied by erythema and it being  "warm to the touch. She denies any prior occurences of this, any trauma to the area, nausea, vomiting, fever, chills, diarrhea, paraesthesias, decreased ROM,chest pain, or shortness of breath. She denies any alcohol use or a diet high in seafood. Is noted to have low HR, states she used to follow with a cardiologist but doesn't anymore. \"Always\" has a low HR but daughter states it's not usually low 40s. Denies dizziness, nausea, lightheadedness.        Review of Systems   Review of Systems   Constitutional:  Negative for activity change, chills and fever.   Respiratory:  Negative for chest tightness and shortness of breath.    Cardiovascular:  Negative for chest pain.   Gastrointestinal:  Negative for abdominal pain, diarrhea, nausea and vomiting.   Musculoskeletal:  Positive for arthralgias (left great toe, and erythema) and joint swelling (left great toe).   Neurological:  Negative for numbness and headaches.         Current Medications       Current Outpatient Medications:     acetaminophen (TYLENOL) 325 mg tablet, Take 3 tablets (975 mg total) by mouth every 8 (eight) hours as needed for mild pain, Disp: , Rfl:     amLODIPine (NORVASC) 10 mg tablet, TAKE 1 TABLET BY MOUTH EVERY DAY, Disp: 90 tablet, Rfl: 1    colchicine (COLCRYS) 0.6 mg tablet, Take 2 tablets, then 1 hour later take 1 tablet, Disp: 3 tablet, Rfl: 0    Diclofenac Sodium (VOLTAREN) 1 %, Apply 2 g topically 4 (four) times a day, Disp: 150 g, Rfl: 3    levothyroxine 25 mcg tablet, TAKE 1 TABLET BY MOUTH EVERY DAY, Disp: 90 tablet, Rfl: 1    metFORMIN (GLUCOPHAGE-XR) 500 mg 24 hr tablet, Take 2 tablets (1,000 mg total) by mouth daily with breakfast, Disp: 180 tablet, Rfl: 1    Multiple Vitamin (multivitamin) capsule, Take 1 capsule by mouth daily, Disp: , Rfl:     PARoxetine (PAXIL) 20 mg tablet, TAKE 1 TABLET BY MOUTH EVERY DAY, Disp: 90 tablet, Rfl: 1    lidocaine (LIDODERM) 5 %, Apply 1 patch topically over 12 hours daily Remove & Discard patch " within 12 hours or as directed by MD (Patient not taking: Reported on 7/25/2024), Disp: 15 patch, Rfl: 0    Current Allergies     Allergies as of 07/25/2024 - Reviewed 07/25/2024   Allergen Reaction Noted    Ace inhibitors Angioedema 03/31/2023    Erythromycin Swelling 06/13/2023            The following portions of the patient's history were reviewed and updated as appropriate: allergies, current medications, past family history, past medical history, past social history, past surgical history and problem list.     Past Medical History:   Diagnosis Date    Acute encephalopathy     Breast cancer (HCC)     Hypertension        Past Surgical History:   Procedure Laterality Date    HYSTERECTOMY      MASTECTOMY MODIFIED RADICAL Bilateral     REVERSE TOTAL SHOULDER ARTHROPLASTY Left 2/1/2024    Procedure: ARTHROPLASTY SHOULDER REVERSE, and all associated procedures;  Surgeon: Tigre Kaufman DO;  Location: [facility];  Service: Orthopedics    SPINE SURGERY      THYROIDECTOMY N/A 6/21/2023    Procedure: LEFT HEMITHYROIDECTOMY, SUBSTERNAL;  Surgeon: Leonel James MD;  Location: [facility];  Service: ENT       Family History   Problem Relation Age of Onset    Hypertension Father     Hypertension Sister     Hypertension Brother          Medications have been verified.        Objective   /85   Pulse (!) 44   Temp (!) 97 °F (36.1 °C)   Resp 14   Wt 64 kg (141 lb 3.2 oz)   SpO2 100%   BMI 25.83 kg/m²        Physical Exam     Physical Exam  Vitals and nursing note reviewed.   Constitutional:       General: She is not in acute distress.     Appearance: Normal appearance. She is not ill-appearing.   HENT:      Head: Normocephalic and atraumatic.   Cardiovascular:      Rate and Rhythm: Regular rhythm. Bradycardia present.      Pulses: Normal pulses.      Heart sounds: Murmur heard.   Pulmonary:      Effort: Pulmonary effort is normal. No respiratory distress.      Breath sounds: Normal breath sounds. No  wheezing, rhonchi or rales.   Musculoskeletal:      Right foot: Normal. Normal range of motion. No swelling, deformity or bony tenderness. Normal pulse.      Left foot: Normal range of motion. Swelling (great toe) and bony tenderness (great toe) present. No deformity. Normal pulse.      Comments: Left great toe erythema and warm to the touch   Skin:     General: Skin is warm and dry.      Capillary Refill: Capillary refill takes less than 2 seconds.   Neurological:      Mental Status: She is alert and oriented to person, place, and time.      Sensory: Sensation is intact.   Psychiatric:         Behavior: Behavior normal.

## 2024-07-30 ENCOUNTER — OFFICE VISIT (OUTPATIENT)
Dept: PHYSICAL THERAPY | Facility: CLINIC | Age: 79
End: 2024-07-30
Payer: COMMERCIAL

## 2024-07-30 DIAGNOSIS — Z85.3 HISTORY OF BREAST CANCER IN FEMALE: ICD-10-CM

## 2024-07-30 DIAGNOSIS — I89.0 LYMPHEDEMA OF LEFT ARM: Primary | ICD-10-CM

## 2024-07-30 PROCEDURE — 97140 MANUAL THERAPY 1/> REGIONS: CPT | Performed by: PHYSICAL THERAPIST

## 2024-07-30 NOTE — PROGRESS NOTES
"Daily Note     Today's date: 2024  Patient name: Eulalio Raphael  : 1945  MRN: [id number]  Referring provider: Tigre Kaufman DO  Dx:   Encounter Diagnosis     ICD-10-CM    1. Lymphedema of left arm  I89.0       2. History of breast cancer in female  Z85.3                     Start Time: 1000  Stop Time: 1053  Total time in clinic (min): 53 minutes    Subjective: Eulalio reports her arm is doing okay.  She notes it is still very heavy.      Objective: See treatment diary below  UPPER EXTREMITY LEFT CALCULATIONS    Flowsheet Row Most Recent Value   Volume UE (mL) 5073   Difference from last visit (mL)  -225   Difference from first visit (mL)  312   Difference from last visit (%)  -5   Difference from first visit (%)  6           Assessment: Tolerated treatment well.  MLD Sequence included:  diaphragmatic breathing, short neck sequence, superficial abdominal sequence, stimulation of bilateral axillary lymph nodes, L inguinal lymph nodes and interaxillary watershed.  Patient demonstrating overall increase in size of L UE since most recent measurements performed, but continues to demonstrate overall 6% reduction in L UE volume since initial evaluation. Patient and patient's daughter reporting \"not consistent\" with new reduction kit for L UE, emphasis by PT on compliance of compression for optimal reduction in L UE volume with patient verbalizing understanding. Patient would benefit from continued PT      Plan: Continue per plan of care.  Progress treatment as tolerated.        Precautions: Precautions: Standard. Hx Breast cancer and m,mastectomy >17 years ago. L UE lymphedema. MD f/jorge 24.      Insurance:  AMA/CMS Eval/ Re-eval POC expires FOTO Auth Status Co-Insurance    - KW       CMS - LX   22 visits total approved  Ref. ID  [id number]                                        2. 4 RE lymph   3. 4    lymph   4. 4  lymph   5.   5/1 shoulder 6.   5/9 lymph   7. ortho   8. 5/ lymph   9. " ortho   10.   5/30 RE lymph 11. ortho 12.   6/12 lymph   13. 6/13 ortho   14. 6/25 ortho   15. 7/1 ortho 16. 7/8 lymph 17. 7/11 lymph 18. 7/24 ortho     19. 7/30 lymph   20.   21.   22.   23.   24.      25.    26. 27. 28. 29. 30.   31. 32.  33. 34. 35. 36.        Compression Rx 6/12 7/11/ 7/30 5/9 5/23 5/30                     Manual Ther         volumetrics    Done    Done    MLD 53 min  25 min X53 min x50 X53 min Done    Compression Bandaging         Wound Care           Garment fitting x 15 min  X10 garment measuring     Ther Ex         Remedial Exercise                                    Ther Activity & Self Care         Skin care         Garment Fit/Train         Sleeping position                  Pt Education         Edema differential dx         Lymphatic A&P Done        Compression options Done

## 2024-07-31 ENCOUNTER — OFFICE VISIT (OUTPATIENT)
Dept: URGENT CARE | Facility: CLINIC | Age: 79
End: 2024-07-31
Payer: COMMERCIAL

## 2024-07-31 VITALS
SYSTOLIC BLOOD PRESSURE: 144 MMHG | HEART RATE: 40 BPM | OXYGEN SATURATION: 98 % | BODY MASS INDEX: 26.52 KG/M2 | RESPIRATION RATE: 14 BRPM | TEMPERATURE: 96 F | DIASTOLIC BLOOD PRESSURE: 68 MMHG | WEIGHT: 145 LBS

## 2024-07-31 DIAGNOSIS — R19.7 DIARRHEA, UNSPECIFIED TYPE: Primary | ICD-10-CM

## 2024-07-31 PROCEDURE — 99213 OFFICE O/P EST LOW 20 MIN: CPT

## 2024-07-31 RX ORDER — LOPERAMIDE HYDROCHLORIDE 2 MG/1
2 CAPSULE ORAL 4 TIMES DAILY PRN
Qty: 30 CAPSULE | Refills: 0 | Status: SHIPPED | OUTPATIENT
Start: 2024-07-31

## 2024-07-31 NOTE — PATIENT INSTRUCTIONS
Imodium as prescribed   Ensure adequate hydration   Ensure adequate fiber intake  Practice proper hand hygiene     Follow up with PCP in 3-5 days.  Proceed to  ER if symptoms worsen.

## 2024-07-31 NOTE — PROGRESS NOTES
[facility]        NAME: Eulalio Raphael is a 78 y.o. female  : 1945    MRN: [id number]  DATE: 2024  TIME: 1:29 PM    Assessment and Plan   Diarrhea, unspecified type [R19.7]  1. Diarrhea, unspecified type  loperamide (IMODIUM) 2 mg capsule        Loose stools for a few weeks, no clear etiology. Discussed dietary changes. Will trial imodium. Recommend close PCP follow up for further investigation if not improving.     Patient Instructions     Imodium as prescribed   Ensure adequate hydration   Ensure adequate fiber intake  Practice proper hand hygiene     Follow up with PCP in 3-5 days.  Proceed to  ER if symptoms worsen.    If tests are performed, our office will contact you with results only if changes need to made to the care plan discussed with you at the visit. You can review your full results on [facility].    Chief Complaint     Chief Complaint   Patient presents with    Diarrhea     Pt presents with diarrhea for the past three weeks// worse with eating         History of Present Illness       Diarrhea   This is a new problem. The current episode started 1 to 4 weeks ago (3 weeks ago). The problem occurs 2 to 4 times per day. The problem has been gradually worsening. Diarrhea characteristics: loose. The patient states that diarrhea does not awaken her from sleep. Associated symptoms include increased flatus. Pertinent negatives include no abdominal pain, bloating, chills, coughing, fever, headaches, myalgias, sweats, vomiting or weight loss. Nothing aggravates the symptoms. She has tried increased fluids for the symptoms. The treatment provided mild relief.       Review of Systems   Review of Systems   Constitutional:  Negative for chills, fever and weight loss.   HENT:  Negative for congestion, postnasal drip, rhinorrhea, sinus pressure, sore throat and trouble swallowing.    Respiratory:  Negative for cough, chest tightness and shortness of breath.    Cardiovascular:   Negative for chest pain and palpitations.   Gastrointestinal:  Positive for diarrhea and flatus. Negative for abdominal distention, abdominal pain, bloating, blood in stool, constipation, nausea and vomiting.   Genitourinary:  Negative for difficulty urinating.   Musculoskeletal:  Negative for myalgias.   Neurological:  Negative for dizziness and headaches.         Current Medications       Current Outpatient Medications:     acetaminophen (TYLENOL) 325 mg tablet, Take 3 tablets (975 mg total) by mouth every 8 (eight) hours as needed for mild pain, Disp: , Rfl:     amLODIPine (NORVASC) 10 mg tablet, TAKE 1 TABLET BY MOUTH EVERY DAY, Disp: 90 tablet, Rfl: 1    colchicine (COLCRYS) 0.6 mg tablet, Take 2 tablets, then 1 hour later take 1 tablet, Disp: 3 tablet, Rfl: 0    Diclofenac Sodium (VOLTAREN) 1 %, Apply 2 g topically 4 (four) times a day, Disp: 150 g, Rfl: 3    levothyroxine 25 mcg tablet, TAKE 1 TABLET BY MOUTH EVERY DAY, Disp: 90 tablet, Rfl: 1    loperamide (IMODIUM) 2 mg capsule, Take 1 capsule (2 mg total) by mouth 4 (four) times a day as needed for diarrhea, Disp: 30 capsule, Rfl: 0    metFORMIN (GLUCOPHAGE) 1000 MG tablet, Take 1,000 mg by mouth 2 (two) times a day, Disp: , Rfl:     Multiple Vitamin (multivitamin) capsule, Take 1 capsule by mouth daily, Disp: , Rfl:     PARoxetine (PAXIL) 20 mg tablet, TAKE 1 TABLET BY MOUTH EVERY DAY, Disp: 90 tablet, Rfl: 1    lidocaine (LIDODERM) 5 %, Apply 1 patch topically over 12 hours daily Remove & Discard patch within 12 hours or as directed by MD (Patient not taking: Reported on 7/25/2024), Disp: 15 patch, Rfl: 0    Current Allergies     Allergies as of 07/31/2024 - Reviewed 07/31/2024   Allergen Reaction Noted    Ace inhibitors Angioedema 03/31/2023    Erythromycin Swelling 06/13/2023            The following portions of the patient's history were reviewed and updated as appropriate: allergies, current medications, past family history, past medical history,  past social history, past surgical history and problem list.     Past Medical History:   Diagnosis Date    Acute encephalopathy     Breast cancer (HCC)     Diabetes mellitus (HCC)     Hypertension        Past Surgical History:   Procedure Laterality Date    HYSTERECTOMY      MASTECTOMY MODIFIED RADICAL Bilateral     REVERSE TOTAL SHOULDER ARTHROPLASTY Left 2/1/2024    Procedure: ARTHROPLASTY SHOULDER REVERSE, and all associated procedures;  Surgeon: Tigre Kaufman DO;  Location: [facility];  Service: Orthopedics    SPINE SURGERY      THYROIDECTOMY N/A 6/21/2023    Procedure: LEFT HEMITHYROIDECTOMY, SUBSTERNAL;  Surgeon: Leonel James MD;  Location: [facility];  Service: ENT       Family History   Problem Relation Age of Onset    Hypertension Father     Hypertension Sister     Hypertension Brother          Medications have been verified.        Objective   /68   Pulse (!) 40   Temp (!) 96 °F (35.6 °C)   Resp 14   Wt 65.8 kg (145 lb)   SpO2 98%   BMI 26.52 kg/m²        Physical Exam     Physical Exam  Constitutional:       General: She is not in acute distress.     Appearance: She is not ill-appearing.   HENT:      Nose: Nose normal.      Mouth/Throat:      Mouth: Mucous membranes are moist.      Pharynx: Oropharynx is clear.   Eyes:      Pupils: Pupils are equal, round, and reactive to light.   Cardiovascular:      Rate and Rhythm: Normal rate and regular rhythm.      Pulses: Normal pulses.      Heart sounds: Normal heart sounds. No murmur heard.     No gallop.   Pulmonary:      Effort: Pulmonary effort is normal. No respiratory distress.      Breath sounds: Normal breath sounds. No wheezing.   Abdominal:      General: Abdomen is flat. Bowel sounds are normal. There is no distension.      Palpations: Abdomen is soft. There is no mass.      Tenderness: There is no abdominal tenderness. There is no right CVA tenderness, left CVA tenderness, guarding or rebound.      Hernia: No hernia is present.    Musculoskeletal:         General: Normal range of motion.      Cervical back: Normal range of motion.   Skin:     General: Skin is warm and dry.      Capillary Refill: Capillary refill takes less than 2 seconds.   Neurological:      Mental Status: She is alert and oriented to person, place, and time.

## 2024-08-06 ENCOUNTER — VBI (OUTPATIENT)
Dept: ADMINISTRATIVE | Facility: OTHER | Age: 79
End: 2024-08-06

## 2024-08-06 NOTE — TELEPHONE ENCOUNTER
08/06/24 1:11 PM     Chart reviewed for Bone density med refill ; nothing is submitted to the patient's insurance at this time.     Hilda Valerio   [facility]

## 2024-08-07 ENCOUNTER — OFFICE VISIT (OUTPATIENT)
Dept: PHYSICAL THERAPY | Facility: CLINIC | Age: 79
End: 2024-08-07
Payer: COMMERCIAL

## 2024-08-07 DIAGNOSIS — S42.212D CLOSED DISPLACED FRACTURE OF SURGICAL NECK OF LEFT HUMERUS WITH ROUTINE HEALING, UNSPECIFIED FRACTURE MORPHOLOGY, SUBSEQUENT ENCOUNTER: Primary | ICD-10-CM

## 2024-08-07 DIAGNOSIS — Z96.612 STATUS POST REVERSE TOTAL REPLACEMENT OF LEFT SHOULDER: ICD-10-CM

## 2024-08-07 DIAGNOSIS — M25.512 ACUTE PAIN OF LEFT SHOULDER: ICD-10-CM

## 2024-08-07 PROCEDURE — 97112 NEUROMUSCULAR REEDUCATION: CPT | Performed by: PHYSICAL THERAPIST

## 2024-08-07 PROCEDURE — 97110 THERAPEUTIC EXERCISES: CPT | Performed by: PHYSICAL THERAPIST

## 2024-08-07 NOTE — PROGRESS NOTES
"Daily Note     Today's date: 2024  Patient name: Eulalio Raphael  : 1945  MRN: [id number]  Referring provider: Tigre Kaufman DO  Dx:   Encounter Diagnosis     ICD-10-CM    1. Closed displaced fracture of surgical neck of left humerus with routine healing, unspecified fracture morphology, subsequent encounter  S42.212D       2. Acute pain of left shoulder  M25.512       3. Status post reverse total replacement of left shoulder  Z96.612               Start Time: 1545  Stop Time: 1632  Total time in clinic (min): 47 minutes    Subjective: Patient noting pain 3/10 L shoulder region to elbow. Pt reporting \"6% improvement\" since SOC.    Objective: See treatment diary below      Assessment: Tolerated treatment well.  Pt issued Green TB for HEP. Pt pleased with progress today. Added AAROM semi reclined for today's tx and HEP, as well. Emphasis on scapular stabilization with all therex. Plan to reassess formally next session with primary PT. Patient demonstrated fatigue post treatment, exhibited good technique with therapeutic exercises, and would benefit from continued PT to reduce limitation and maximize functional mobility of the shoulder.      Plan: RE with primary PT next session.        Precautions: Precautions: Standard. Hx Breast cancer and m,mastectomy >17 years ago. L UE lymphedema. MD f/u 24.      Insurance:  AMA/CMS Eval/ Re-eval POC expires FOTO Auth Status Co-Insurance    - KW       CMS - LX   22 visits total approved  Ref. ID  [id number]                                        2.  RE lymph   3. 4    lymph   4.   lymph   5.   5/ shoulder 6.   5/9 lymph   7. ortho   8.  lymph   9. ortho   10.    RE lymph 11. ortho 12.   612 lymph   13. [place]    14. Ortho    15.    16.  17.  18.      19.    20.   21.   22.   23.   24.      25.    26. 27. 28. 29. 30.   31. 32.  33. 34. 35. 36.        Visit 5 6 7 8 9 10 11 Re-eval    24     " "                                Neuro Re-Ed           Scap isos Flex, abd, ext 20 x 5 sec each Flex, abd, ext 20 x 5 sec each Flex, abd, ext 20 x 5 sec each Flex, abd, ext 20 x 5 sec each   Flex, abd,ext x20 hold 5 sec    Active flexion 2x10 in mirror 2x10 in mirror AAROM cane 2x10     Eccentric lowering x12 AAROM cane 2x10    Ecc x15 Ecc 25x  Ecc 3x10     Rows  Gtb 3x10 GTB 3x10 GTB 3x10 GTB  3x10   GTB 2x10    Shoulder ext    GTB 3x10 GTB 3x10    GTB 2x10                                     Ther Ex           Table slides Ball slides on table into flexion and abd x30 each Ball slides on table into flexion and abd x30 each Ball slides on table into flexion and abd x30 each Ball slides on table into flexion and abd x30 each Ball slides on table into flexion and abd x30 each Ball slides on table into flexion and abd x30 each Ball slides on table into flexion and abd x30 ea    Supine HHA flex OH X30  x30   Cane 30x  Cane 30x  Cane 30x    SL ER  Standing AROM 20x AROM 20x YTB AROM 20x YTB AROM 20x ytb  AROM 20x ytb  -    PROM KH x 10 min MR 10min MR 10 min MR 8 min  MR 8 min  KH x 10 min RL x 10 min    Scap sq 5\" x20   5\" x20 5\" x20 5\"x20  X20 hold 5 sec    Wand abd AAROM    X20    X20 abd/ER hold 3 sec ea, semi reclined    Wall slides    Into flex x20  Attempted single arm     Pulleys 20x Pulleys 20x   Pulley x 20 x    Supine horizontal abd      YTB 2x10 -           AAROM flexion  semi reclined x20 hold 5    Ther Activity           HEP                                                       Modalities                                   Access Code: [email or web address]  URL: [email or web address]  Date: 06/13/2024  Prepared by: Consuelo Méndez    Exercises  - Seated Shoulder Flexion Towel Slide at Table Top  - 1 x daily - 7 x weekly - 3 sets - 10 reps  - Seated Shoulder Abduction Towel Slide at Table Top  - 1 x daily - 7 x weekly - 3 sets - 10 reps           "

## 2024-08-12 ENCOUNTER — EVALUATION (OUTPATIENT)
Dept: PHYSICAL THERAPY | Facility: CLINIC | Age: 79
End: 2024-08-12
Payer: COMMERCIAL

## 2024-08-12 DIAGNOSIS — M25.512 ACUTE PAIN OF LEFT SHOULDER: ICD-10-CM

## 2024-08-12 DIAGNOSIS — S42.212D CLOSED DISPLACED FRACTURE OF SURGICAL NECK OF LEFT HUMERUS WITH ROUTINE HEALING, UNSPECIFIED FRACTURE MORPHOLOGY, SUBSEQUENT ENCOUNTER: Primary | ICD-10-CM

## 2024-08-12 DIAGNOSIS — Z85.3 HISTORY OF BREAST CANCER IN FEMALE: ICD-10-CM

## 2024-08-12 PROCEDURE — 97530 THERAPEUTIC ACTIVITIES: CPT

## 2024-08-12 PROCEDURE — 97110 THERAPEUTIC EXERCISES: CPT

## 2024-08-12 NOTE — PROGRESS NOTES
PT Re-Evaluation     Today's date: 2024  Patient name: Eulalio Raphael  : 1945  MRN: [id number]  Referring provider: Tigre Kaufman DO  Dx:   Encounter Diagnosis     ICD-10-CM    1. Acute pain of left shoulder  M25.512       2. Closed displaced fracture of surgical neck of left humerus with routine healing, unspecified fracture morphology, subsequent encounter  S42.212D Ambulatory Referral to Physical Therapy      3. Status post reverse total replacement of left shoulder  Z96.612 Ambulatory Referral to Physical Therapy                     Assessment  Eulalio Raphael has been seen in orthopedic OPPT for 11 visits over the course of 16 weeks. During this time, she has made gains in shoulder and elbow strength. Due to these gains she is now able to actively move left arm with a decrease in reported pain. Despite the gains she had made, Eulalio continues to be limited in shoulder mobility and range of motion. She continues to utilize compensatory strategies when attempting to lift arm actively over 60 degrees. She also continues to be limited in passive shoulder flexion and abduction specifically. These impairments limit Eulalio's ability to independently perform self care to her face and hair. She remains a strong candidate for continued skilled PT to address impairments listed above and maximize potential restoration of function in her left shoulder.     Education of today's visit includes AAROM and PROM, specifically shoulder flexion and abduction to Eulalio and her daughter to achieve more consistent stretching program. Plan to continue POC for comprehensive stretching program to reduce limitations in shoulder mobility. POC discussed with Eulalio and her daughter and they expressed agreement with no additional concerns or questions at this time.       Goals (24: Met unless otherwise specified)  Short Term Goals to be accomplished in 3 weeks:  STG1: Pt will be I with HEP Progressing 24  STG2: Pt  will be I with posture management   STG3: Pt will demo 50% improvement in shoulder AROM to improve self care and household ADLs Progressing 8/12/24  STG4: Pt will demo 1/2 MMT strength in shoulder Progressing (shldr flex/abduction 1/3 MMT improvement)  STG5: Pt will report pain < 3/10 in shoulder     Long Term Goals to be accomplished in 6 weeks: (ALL PROGRESSING)  LTG1: Pt will demo full shoulder AROM to return to household duties  LTG2: Pt will return to self care/household ADLs pain free as per PLOF  LTG3: Pt will demo shoulder strength WNL       Plan  Plan details:  HEP development, stretching, strengthening, A/AA/PROM, joint mobilizations, posture education, STM/MI as needed to reduce muscle tension, muscle reeducation, PLOC discussed and agreed upon with patient and daughter.      Patient would benefit from: PT eval and skilled physical therapy  Planned modality interventions: cryotherapy and thermotherapy: hydrocollator packs  Planned therapy interventions: manual therapy, neuromuscular re-education, self care, therapeutic exercise, therapeutic activities and home exercise program  Frequency: 2x week (2-3x/week)  Treatment plan discussed with: patient and daughter        Subjective Evaluation  Eulalio Raphael believes she has made some progress. She states she feels stronger, but is still unable to lift arm overhead. She would like to be able to touch her hair/face. When discussing POC with Eulalio's daughter, she states the surgeon noted shoulder adduction contractures following her most recent surgery. Her daughter expressed she would like to learn how to help stretch her mom's shoulder at home for more consistency. She continues to see lymphedema PT and is currently wearing lymphedema wrap on left arm.       Objective     Postural Observations  Seated posture: poor  Standing posture: fair  (8/12/24: improved overall seated posture with decreased thoracic kyphosis and rounded shoulders)    Observations      Additional Observation Details  Significant Lymphedema throughout L UE  (8/12/24: grossly similar)      Active Range of Motion   Left Shoulder   Flexion: 40 degrees   Abduction: 55 degrees   External rotation 0°: -28 degrees   Internal rotation BTB: Active internal rotation behind the back: L back pocket.     Left Shoulder (8/12/24)  Flexion: 50 degrees   Abduction: 60 degree   External rotation at 0°: -20 degrees   Internal rotation BTB: Active internal rotation behind back: L back pocket.      Passive Range of Motion   Left Shoulder   Flexion: 115 degrees   Abduction: 120 degrees   External rotation 0°: -20 degrees     Left Shoulder (8/12/24)  Flexion: 120 degrees   Abduction: 120 degrees   External rotation 0°: -10 degrees     Strength/Myotome Testing     Right Shoulder   Normal muscle strength    Additional Strength Details  L shldr and elbow strength 3- to 3/5 MMT throughout  (8/12/24: L shoulder -3/5 shoulder flexion and abduction; 4/5 elbow strength MMT)       Insurance:  [employer]/CMS Eval/ Re-eval POC expires FOTO Auth Status Co-Insurance    4/11- KW       CMS 4/18- LX   22 visits total approved  Ref. ID  [id number]                                     4/11   2. 4/18 RE lymph   3. 4/22    lymph   4. 4/30  lymph   5.   5/1 shoulder 6.   5/9 lymph   7. ortho   8. 5/23 lymph   9. ortho   10.   5/30 RE lymph 11. ortho 12.   6/12 lymph   13. [place] 6/13   14. Ortho 6/25   15.    16.  17.  18.      19.    20.   21. Ortho 8/12   22.   23.   24.      25.    26. 27. 28. 29. 30.   31. 32.  33. 34. 35. 36.        Visit 5 6 7 8 9 10 11 12 Re-eval    6/4/24 6/13/24 6/25 7/1 7/9 7/24 8/7/24 8/12/24                                    Neuro Re-Ed           Scap isos Flex, abd, ext 20 x 5 sec each Flex, abd, ext 20 x 5 sec each Flex, abd, ext 20 x 5 sec each Flex, abd, ext 20 x 5 sec each   Flex, abd,ext x20 hold 5 sec    Active flexion 2x10 in mirror 2x10 in mirror AAROM cane 2x10     Eccentric lowering x12 AAROM cane 2x10    Ecc  "x15 Ecc 25x  Ecc 3x10     Rows  Gtb 3x10 GTB 3x10 GTB 3x10 GTB  3x10   GTB 2x10 5.5# 2x10   Shoulder ext    GTB 3x10 GTB 3x10    GTB 2x10 5.5# 2x10                                    Ther Ex           Table slides Ball slides on table into flexion and abd x30 each Ball slides on table into flexion and abd x30 each Ball slides on table into flexion and abd x30 each Ball slides on table into flexion and abd x30 each Ball slides on table into flexion and abd x30 each Ball slides on table into flexion and abd x30 each Ball slides on table into flexion and abd x30 ea Wall slides    Supine HHA flex OH X30  x30   Cane 30x  Cane 30x  Cane 30x Cane 30x   SL ER  Standing AROM 20x AROM 20x YTB AROM 20x YTB AROM 20x ytb  AROM 20x ytb  -    PROM KH x 10 min MR 10min MR 10 min MR 8 min  MR 8 min  KH x 10 min RL x 10 min    Scap sq 5\" x20   5\" x20 5\" x20 5\"x20  X20 hold 5 sec    Wand abd AAROM    X20    X20 abd/ER hold 3 sec ea, semi reclined    Wall slides    Into flex x20  Attempted single arm     Pulleys 20x Pulleys 20x   Pulley x 20 x    Supine horizontal abd      YTB 2x10 - YTB 2x10           AAROM flexion  semi reclined x20 hold 5    Ther Activity           HEP                   RE x 20 min     PROM family member edu 5 min                                     Modalities                                   "

## 2024-08-21 ENCOUNTER — OFFICE VISIT (OUTPATIENT)
Dept: PHYSICAL THERAPY | Facility: CLINIC | Age: 79
End: 2024-08-21
Payer: COMMERCIAL

## 2024-08-21 DIAGNOSIS — Z96.612 STATUS POST REVERSE TOTAL REPLACEMENT OF LEFT SHOULDER: ICD-10-CM

## 2024-08-21 DIAGNOSIS — S42.212D CLOSED DISPLACED FRACTURE OF SURGICAL NECK OF LEFT HUMERUS WITH ROUTINE HEALING, UNSPECIFIED FRACTURE MORPHOLOGY, SUBSEQUENT ENCOUNTER: Primary | ICD-10-CM

## 2024-08-21 DIAGNOSIS — I89.0 LYMPHEDEMA OF LEFT ARM: ICD-10-CM

## 2024-08-21 DIAGNOSIS — Z85.3 HISTORY OF BREAST CANCER IN FEMALE: ICD-10-CM

## 2024-08-21 DIAGNOSIS — M25.512 ACUTE PAIN OF LEFT SHOULDER: ICD-10-CM

## 2024-08-21 PROCEDURE — 97112 NEUROMUSCULAR REEDUCATION: CPT

## 2024-08-21 PROCEDURE — 97110 THERAPEUTIC EXERCISES: CPT

## 2024-08-21 NOTE — PROGRESS NOTES
Daily Note     Today's date: 2024  Patient name: Eulalio Raphael  : 1945  MRN: [id number]  Referring provider: Tigre Kaufman DO  Dx:   Encounter Diagnosis     ICD-10-CM    1. Closed displaced fracture of surgical neck of left humerus with routine healing, unspecified fracture morphology, subsequent encounter  S42.212D       2. Acute pain of left shoulder  M25.512       3. History of breast cancer in female  Z85.3       4. Status post reverse total replacement of left shoulder  Z96.612       5. Lymphedema of left arm  I89.0                      Subjective: Pt reports no new complaints. Lifting arm overhead is still most difficult activity.      Objective: AROM for flexion and scaption to ~100-110 to end session, PROM near full in semi-reclined position,       Assessment: Tolerated treatment well. Patient demonstrated fatigue post treatment and would benefit from continued PT.  Does well w/ exercise progressions, fatigue but no increase in pain by end of session. Given new green band for home. Does show improvement in motion by end, fatigue negatively impacts ability to lift arm. Will continue as sx allow, focus on functional ROM and strength as able.       Plan: Continue per plan of care. Per primary PT     Precautions: Precautions: Standard. Hx Breast cancer and m,mastectomy >17 years ago. L UE lymphedema. MD f/u 24.        Insurance:  AMA/CMS Eval/ Re-eval POC expires FOTO Auth Status Co-Insurance    - KW       CMS - LX   22 visits total approved  Ref. ID  [id number]                                        2. 4 RE lymph   3. 4    lymph   4. 4  lymph   5.   5/1 shoulder 6.   5/9 lymph   7. ortho   8.  lymph   9. ortho   10.   5 RE lymph 11. ortho 12.   6 lymph   13. [place]    14. Ortho    15.    16.  17.  18.      19.    20.   21. Ortho    22.  Ortho  23.   24.      25.    26. 27. 28. 29. 30.   31. 32.  33. 34. 35. 36.        Visit 5 6 7 8 9 10 11 12  "Re-eval 13    6/4/24 6/13/24 6/25 7/1 7/9 7/24 8/7/24 8/12/24 8/21/24                                       Neuro Re-Ed            Scap isos Flex, abd, ext 20 x 5 sec each Flex, abd, ext 20 x 5 sec each Flex, abd, ext 20 x 5 sec each Flex, abd, ext 20 x 5 sec each   Flex, abd,ext x20 hold 5 sec  Scap setting x20    B ER w/ scap sq red x20   Active flexion 2x10 in mirror 2x10 in mirror AAROM cane 2x10     Eccentric lowering x12 AAROM cane 2x10    Ecc x15 Ecc 25x  Ecc 3x10   Ecc control x15 total   Rows  Gtb 3x10 GTB 3x10 GTB 3x10 GTB  3x10   GTB 2x10 5.5# 2x10 Pink band 2x10   Shoulder ext    GTB 3x10 GTB 3x10    GTB 2x10 5.5# 2x10 Pink band 2x10                                       Ther Ex            Table slides Ball slides on table into flexion and abd x30 each Ball slides on table into flexion and abd x30 each Ball slides on table into flexion and abd x30 each Ball slides on table into flexion and abd x30 each Ball slides on table into flexion and abd x30 each Ball slides on table into flexion and abd x30 each Ball slides on table into flexion and abd x30 ea Wall slides     Supine HHA flex OH X30  x30   Cane 30x  Cane 30x  Cane 30x Cane 30x Hand held overhead flexion x10 in semi reclined, x10 in sitting, x10 w/ ecc control    SL ER  Standing AROM 20x AROM 20x YTB AROM 20x YTB AROM 20x ytb  AROM 20x ytb  -  Isos x15 in semi-reclined   PROM KH x 10 min MR 10min MR 10 min MR 8 min  MR 8 min  KH x 10 min RL x 10 min  CP x 10 min total    Scap sq 5\" x20   5\" x20 5\" x20 5\"x20  X20 hold 5 sec     Wand abd AAROM    X20    X20 abd/ER hold 3 sec ea, semi reclined  rev   Wall slides    Into flex x20  Attempted single arm     Pulleys 20x Pulleys 20x   Pulley x 20 x     Supine horizontal abd      YTB 2x10 - YTB 2x10            AAROM flexion  semi reclined x20 hold 5     Ther Activity            HEP                    RE x 20 min     PROM family member edu 5 min                                         Modalities               "                        Access Code: [email or web address]  URL: [email or web address]  Date: 06/13/2024  Prepared by: Consuelo Méndez    Exercises  - Seated Shoulder Flexion Towel Slide at Table Top  - 1 x daily - 7 x weekly - 3 sets - 10 reps  - Seated Shoulder Abduction Towel Slide at Table Top  - 1 x daily - 7 x weekly - 3 sets - 10 reps

## 2024-09-04 ENCOUNTER — OFFICE VISIT (OUTPATIENT)
Dept: PHYSICAL THERAPY | Facility: CLINIC | Age: 79
End: 2024-09-04
Payer: COMMERCIAL

## 2024-09-04 DIAGNOSIS — Z85.3 HISTORY OF BREAST CANCER IN FEMALE: ICD-10-CM

## 2024-09-04 DIAGNOSIS — M25.512 ACUTE PAIN OF LEFT SHOULDER: ICD-10-CM

## 2024-09-04 DIAGNOSIS — S42.212D CLOSED DISPLACED FRACTURE OF SURGICAL NECK OF LEFT HUMERUS WITH ROUTINE HEALING, UNSPECIFIED FRACTURE MORPHOLOGY, SUBSEQUENT ENCOUNTER: Primary | ICD-10-CM

## 2024-09-04 PROCEDURE — 97110 THERAPEUTIC EXERCISES: CPT

## 2024-09-04 NOTE — PROGRESS NOTES
Daily Note     Today's date: 2024  Patient name: Eulalio Raphael  : 1945  MRN: [id number]  Referring provider: Tigre Kaufman DO  Dx:   Encounter Diagnosis     ICD-10-CM    1. Closed displaced fracture of surgical neck of left humerus with routine healing, unspecified fracture morphology, subsequent encounter  S42.212D       2. Acute pain of left shoulder  M25.512       3. History of breast cancer in female  Z85.3           Start Time: 1032          Subjective: Patient denies pain. She states she still has difficulty reaching overhead.       Objective: See treatment diary below      Assessment: decreased session time, secondary to patient's tardiness. Tolerated treatment well. She demo's nearly full passive ROM without reports of pain. She continues to demo decreased scapulohumeral rhythm when performing OH reaching. Included scap resetting, which she tolerated very well.  Patient demonstrated fatigue post treatment, exhibited good technique with therapeutic exercises, and would benefit from continued PT to promote independent mobility of left arm.       Plan: Continue per plan of care.  Progress treatment as tolerated.       Precautions: Precautions: Standard. Hx Breast cancer and m,mastectomy >17 years ago. L UE lymphedema. MD f/u 24.        Insurance:  AMA/CMS Eval/ Re-eval POC expires FOTO Auth Status Co-Insurance    - KW       CMS - LX   22 visits total approved  Ref. ID  [id number]                                        2.  RE lymph   3. 4    lymph   4.   lymph   5.   5/ shoulder 6.   5/9 lymph   7. ortho   8.  lymph   9. ortho   10.    RE lymph 11. ortho 12.    lymph   13. [place]    14. Ortho    15.    16.  17.  18.      19.    20.   21. Ortho    22.  Ortho  23.   24.      25.    26. 27. 28. 29. 30.   31. 32.  33. 34. 35. 36.        Visit 6 7 8 9 10 11 12 Re-eval 13 14    24 7/ 724                    "                    Neuro Re-Ed            Scap isos Flex, abd, ext 20 x 5 sec each Flex, abd, ext 20 x 5 sec each Flex, abd, ext 20 x 5 sec each   Flex, abd,ext x20 hold 5 sec  Scap setting x20    B ER w/ scap sq red x20 Scap setting x20    B ER w/ scap sq red x20   Active flexion 2x10 in mirror AAROM cane 2x10     Eccentric lowering x12 AAROM cane 2x10    Ecc x15 Ecc 25x  Ecc 3x10   Ecc control x15 total    Rows  GTB 3x10 GTB 3x10 GTB  3x10   GTB 2x10 5.5# 2x10 Pink band 2x10 Pink band 2x10   Shoulder ext   GTB 3x10 GTB 3x10    GTB 2x10 5.5# 2x10 Pink band 2x10 Pink band 2x10                                       Ther Ex            Table slides Ball slides on table into flexion and abd x30 each Ball slides on table into flexion and abd x30 each Ball slides on table into flexion and abd x30 each Ball slides on table into flexion and abd x30 each Ball slides on table into flexion and abd x30 each Ball slides on table into flexion and abd x30 ea Wall slides      Supine HHA flex OH x30   Cane 30x  Cane 30x  Cane 30x Cane 30x Hand held overhead flexion x10 in semi reclined, x10 in sitting, x10 w/ ecc control  Hand held overhead     Supine x10   SL ER Standing AROM 20x AROM 20x YTB AROM 20x YTB AROM 20x ytb  AROM 20x ytb  -  Isos x15 in semi-reclined Iso 10\" x10 semi-reclined position    PROM MR 10min MR 10 min MR 8 min  MR 8 min  KH x 10 min RL x 10 min  CP x 10 min total  MR x8 min    Scap sq  5\" x20 5\" x20 5\"x20  X20 hold 5 sec      Wand abd AAROM   X20    X20 abd/ER hold 3 sec ea, semi reclined  rev 20x flexion*    20x abduction*     *with scap resetting    Wall slides   Into flex x20  Attempted single arm     Pulleys 20x Pulleys 20x   [place] x 20 x      Supine horizontal abd     YTB 2x10 - YTB 2x10   Pink TB          AAROM flexion  semi reclined x20 hold 5      Ther Activity            HEP                   RE x 20 min     PROM family member edu 5 min                                          Modalities              "                         Access Code: [email or web address]  URL: [email or web address]  Date: 06/13/2024  Prepared by: Consuelo Méndez    Exercises  - Seated Shoulder Flexion Towel Slide at Table Top  - 1 x daily - 7 x weekly - 3 sets - 10 reps  - Seated Shoulder Abduction Towel Slide at Table Top  - 1 x daily - 7 x weekly - 3 sets - 10 reps

## 2024-09-06 ENCOUNTER — HOSPITAL ENCOUNTER (INPATIENT)
Facility: HOSPITAL | Age: 79
LOS: 3 days | Discharge: HOME | DRG: 603 | End: 2024-09-09
Attending: EMERGENCY MEDICINE | Admitting: INTERNAL MEDICINE
Payer: COMMERCIAL

## 2024-09-06 DIAGNOSIS — S41.152A DOG BITE OF LEFT UPPER EXTREMITY, INITIAL ENCOUNTER: ICD-10-CM

## 2024-09-06 DIAGNOSIS — E11.9 TYPE 2 DIABETES MELLITUS WITHOUT COMPLICATION, WITHOUT LONG-TERM CURRENT USE OF INSULIN (HCC): ICD-10-CM
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
## 2024-09-06 DIAGNOSIS — L03.90 CELLULITIS: Primary | ICD-10-CM

## 2024-09-06 DIAGNOSIS — I89.0 LYMPHEDEMA OF LEFT ARM: ICD-10-CM

## 2024-09-06 PROBLEM — E87.20 LACTIC ACIDOSIS: Status: ACTIVE | Noted: 2024-09-06

## 2024-09-06 LAB
ALBUMIN SERPL BCG-MCNC: 3.5 G/DL (ref 3.5–5)
ALP SERPL-CCNC: 102 U/L (ref 34–104)
ALT SERPL W P-5'-P-CCNC: 10 U/L (ref 7–52)
ANION GAP SERPL CALCULATED.3IONS-SCNC: 13 MMOL/L (ref 4–13)
APTT PPP: 24 SECONDS (ref 23–34)
AST SERPL W P-5'-P-CCNC: 16 U/L (ref 13–39)
BILIRUB SERPL-MCNC: 0.59 MG/DL (ref 0.2–1)
BUN SERPL-MCNC: 13 MG/DL (ref 5–25)
CALCIUM SERPL-MCNC: 9.4 MG/DL (ref 8.4–10.2)
CHLORIDE SERPL-SCNC: 100 MMOL/L (ref 96–108)
CO2 SERPL-SCNC: 19 MMOL/L (ref 21–32)
CREAT SERPL-MCNC: 0.77 MG/DL (ref 0.6–1.3)
ERYTHROCYTE [DISTWIDTH] IN BLOOD BY AUTOMATED COUNT: 15.9 % (ref 11.6–15.1)
GFR SERPL CREATININE-BSD FRML MDRD: 74 ML/MIN/1.73SQ M
GLUCOSE SERPL-MCNC: 202 MG/DL (ref 65–140)
GLUCOSE SERPL-MCNC: 229 MG/DL (ref 65–140)
HCT VFR BLD AUTO: 42.8 % (ref 34.8–46.1)
HGB BLD-MCNC: 13.4 G/DL (ref 11.5–15.4)
INR PPP: 0.99 (ref 0.85–1.19)
LACTATE SERPL-SCNC: 3 MMOL/L (ref 0.5–2)
LACTATE SERPL-SCNC: 3.6 MMOL/L (ref 0.5–2)
MCH RBC QN AUTO: 25.7 PG (ref 26.8–34.3)
MCHC RBC AUTO-ENTMCNC: 31.3 G/DL (ref 31.4–37.4)
MCV RBC AUTO: 82 FL (ref 82–98)
PLATELET # BLD AUTO: 223 THOUSANDS/UL (ref 149–390)
PMV BLD AUTO: 9.5 FL (ref 8.9–12.7)
POTASSIUM SERPL-SCNC: 3.8 MMOL/L (ref 3.5–5.3)
PROCALCITONIN SERPL-MCNC: 1.47 NG/ML
PROT SERPL-MCNC: 6.8 G/DL (ref 6.4–8.4)
PROTHROMBIN TIME: 13.6 SECONDS (ref 12.3–15)
RBC # BLD AUTO: 5.21 MILLION/UL (ref 3.81–5.12)
SODIUM SERPL-SCNC: 132 MMOL/L (ref 135–147)
WBC # BLD AUTO: 16.15 THOUSAND/UL (ref 4.31–10.16)

## 2024-09-06 PROCEDURE — 99223 1ST HOSP IP/OBS HIGH 75: CPT | Performed by: INTERNAL MEDICINE

## 2024-09-06 PROCEDURE — 82948 REAGENT STRIP/BLOOD GLUCOSE: CPT

## 2024-09-06 PROCEDURE — 80053 COMPREHEN METABOLIC PANEL: CPT | Performed by: PHYSICIAN ASSISTANT

## 2024-09-06 PROCEDURE — 99285 EMERGENCY DEPT VISIT HI MDM: CPT | Performed by: PHYSICIAN ASSISTANT

## 2024-09-06 PROCEDURE — 83605 ASSAY OF LACTIC ACID: CPT | Performed by: PHYSICIAN ASSISTANT

## 2024-09-06 PROCEDURE — 87040 BLOOD CULTURE FOR BACTERIA: CPT | Performed by: PHYSICIAN ASSISTANT

## 2024-09-06 PROCEDURE — 96365 THER/PROPH/DIAG IV INF INIT: CPT

## 2024-09-06 PROCEDURE — 85025 COMPLETE CBC W/AUTO DIFF WBC: CPT | Performed by: PHYSICIAN ASSISTANT

## 2024-09-06 PROCEDURE — 85610 PROTHROMBIN TIME: CPT | Performed by: PHYSICIAN ASSISTANT

## 2024-09-06 PROCEDURE — 84145 PROCALCITONIN (PCT): CPT | Performed by: PHYSICIAN ASSISTANT

## 2024-09-06 PROCEDURE — 85730 THROMBOPLASTIN TIME PARTIAL: CPT | Performed by: PHYSICIAN ASSISTANT

## 2024-09-06 PROCEDURE — 99284 EMERGENCY DEPT VISIT MOD MDM: CPT

## 2024-09-06 PROCEDURE — 36415 COLL VENOUS BLD VENIPUNCTURE: CPT | Performed by: PHYSICIAN ASSISTANT

## 2024-09-06 RX ORDER — SODIUM CHLORIDE 9 MG/ML
125 INJECTION, SOLUTION INTRAVENOUS CONTINUOUS
Status: DISCONTINUED | OUTPATIENT
Start: 2024-09-06 | End: 2024-09-06

## 2024-09-06 RX ORDER — ENOXAPARIN SODIUM 100 MG/ML
40 INJECTION SUBCUTANEOUS DAILY
Status: DISCONTINUED | OUTPATIENT
Start: 2024-09-07 | End: 2024-09-09

## 2024-09-06 RX ORDER — INSULIN LISPRO 100 [IU]/ML
1-5 INJECTION, SOLUTION INTRAVENOUS; SUBCUTANEOUS
Status: DISCONTINUED | OUTPATIENT
Start: 2024-09-07 | End: 2024-09-09

## 2024-09-06 RX ORDER — AMLODIPINE BESYLATE 10 MG/1
10 TABLET ORAL DAILY
Status: DISCONTINUED | OUTPATIENT
Start: 2024-09-07 | End: 2024-09-09 | Stop reason: HOSPADM

## 2024-09-06 RX ORDER — LEVOTHYROXINE SODIUM 25 UG/1
25 TABLET ORAL DAILY
Status: DISCONTINUED | OUTPATIENT
Start: 2024-09-07 | End: 2024-09-09 | Stop reason: HOSPADM

## 2024-09-06 RX ORDER — ACETAMINOPHEN 325 MG/1
650 TABLET ORAL EVERY 6 HOURS PRN
Status: DISCONTINUED | OUTPATIENT
Start: 2024-09-06 | End: 2024-09-09 | Stop reason: HOSPADM

## 2024-09-06 RX ORDER — INSULIN LISPRO 100 [IU]/ML
1-5 INJECTION, SOLUTION INTRAVENOUS; SUBCUTANEOUS
Status: DISCONTINUED | OUTPATIENT
Start: 2024-09-06 | End: 2024-09-09

## 2024-09-06 RX ORDER — PAROXETINE 20 MG/1
20 TABLET, FILM COATED ORAL DAILY
Status: DISCONTINUED | OUTPATIENT
Start: 2024-09-07 | End: 2024-09-09 | Stop reason: HOSPADM

## 2024-09-06 RX ORDER — KETOROLAC TROMETHAMINE 30 MG/ML
15 INJECTION, SOLUTION INTRAMUSCULAR; INTRAVENOUS EVERY 6 HOURS PRN
Status: DISPENSED | OUTPATIENT
Start: 2024-09-06 | End: 2024-09-08

## 2024-09-06 RX ORDER — SODIUM CHLORIDE 9 MG/ML
75 INJECTION, SOLUTION INTRAVENOUS CONTINUOUS
Status: DISCONTINUED | OUTPATIENT
Start: 2024-09-06 | End: 2024-09-07

## 2024-09-06 RX ADMIN — AMPICILLIN SODIUM AND SULBACTAM SODIUM 3 G: 2; 1 INJECTION, POWDER, FOR SOLUTION INTRAMUSCULAR; INTRAVENOUS at 15:39

## 2024-09-06 RX ADMIN — SODIUM CHLORIDE 75 ML/HR: 0.9 INJECTION, SOLUTION INTRAVENOUS at 19:44

## 2024-09-06 RX ADMIN — SODIUM CHLORIDE 125 ML/HR: 0.9 INJECTION, SOLUTION INTRAVENOUS at 16:41

## 2024-09-06 RX ADMIN — ACETAMINOPHEN 650 MG: 325 TABLET ORAL at 21:51

## 2024-09-06 RX ADMIN — AMPICILLIN SODIUM AND SULBACTAM SODIUM 3 G: 2; 1 INJECTION, POWDER, FOR SOLUTION INTRAMUSCULAR; INTRAVENOUS at 18:40

## 2024-09-06 RX ADMIN — INSULIN LISPRO 1 UNITS: 100 INJECTION, SOLUTION INTRAVENOUS; SUBCUTANEOUS at 21:20

## 2024-09-06 NOTE — PLAN OF CARE
Problem: Potential for Falls  Goal: Patient will remain free of falls  Description: INTERVENTIONS:  - Educate patient/family on patient safety including physical limitations  - Instruct patient to call for assistance with activity   - Consult OT/PT to assist with strengthening/mobility   - Keep Call bell within reach  - Keep bed low and locked with side rails adjusted as appropriate  - Keep care items and personal belongings within reach  - Initiate and maintain comfort rounds  - Make Fall Risk Sign visible to staff  - Offer Toileting every 2 Hours, in advance of need  - Initiate/Maintain bed alarm  - Apply yellow socks and bracelet for high fall risk patients  - Consider moving patient to room near nurses station  Outcome: Progressing     Problem: PAIN - ADULT  Goal: Verbalizes/displays adequate comfort level or baseline comfort level  Description: Interventions:  - Encourage patient to monitor pain and request assistance  - Assess pain using appropriate pain scale  - Administer analgesics based on type and severity of pain and evaluate response  - Implement non-pharmacological measures as appropriate and evaluate response  - Consider cultural and social influences on pain and pain management  - Notify physician/advanced practitioner if interventions unsuccessful or patient reports new pain  Outcome: Progressing     Problem: INFECTION - ADULT  Goal: Absence or prevention of progression during hospitalization  Description: INTERVENTIONS:  - Assess and monitor for signs and symptoms of infection  - Monitor lab/diagnostic results  - Monitor all insertion sites, i.e. indwelling lines, tubes, and drains  - Monitor endotracheal if appropriate and nasal secretions for changes in amount and color  - [employer] appropriate cooling/warming therapies per order  - Administer medications as ordered  - Instruct and encourage patient and family to use good hand hygiene technique  - Identify and instruct in appropriate isolation  precautions for identified infection/condition  Outcome: Progressing     Problem: SAFETY ADULT  Goal: Patient will remain free of falls  Description: INTERVENTIONS:  - Educate patient/family on patient safety including physical limitations  - Instruct patient to call for assistance with activity   - Consult OT/PT to assist with strengthening/mobility   - Keep Call bell within reach  - Keep bed low and locked with side rails adjusted as appropriate  - Keep care items and personal belongings within reach  - Initiate and maintain comfort rounds  - Make Fall Risk Sign visible to staff  - Offer Toileting every 2 Hours, in advance of need  - Initiate/Maintain bed alarm  - Apply yellow socks and bracelet for high fall risk patients  - Consider moving patient to room near nurses station  Outcome: Progressing  Goal: Maintain or return to baseline ADL function  Description: INTERVENTIONS:  -  Assess patient's ability to carry out ADLs; assess patient's baseline for ADL function and identify physical deficits which impact ability to perform ADLs (bathing, care of mouth/teeth, toileting, grooming, dressing, etc.)  - Assess/evaluate cause of self-care deficits   - Assess range of motion  - Assess patient's mobility; develop plan if impaired  - Assess patient's need for assistive devices and provide as appropriate  - Encourage maximum independence but intervene and supervise when necessary  - Involve family in performance of ADLs  - Assess for home care needs following discharge   - Consider OT consult to assist with ADL evaluation and planning for discharge  - Provide patient education as appropriate  Outcome: Progressing  Goal: Maintains/Returns to pre admission functional level  Description: INTERVENTIONS:  - Perform AM-PAC 6 Click Basic Mobility/ Daily Activity assessment daily.  - Set and communicate daily mobility goal to care team and patient/family/caregiver.   - Collaborate with rehabilitation services on mobility goals  if consulted  - Perform Range of Motion 3 times a day.  - Reposition patient every 2 hours.  - Dangle patient 3 times a day  - Stand patient 4 times a day  - Ambulate patient 3 times a day  - Out of bed to chair 3 times a day   - Out of bed for meals 3 times a day  - Out of bed for toileting  - Record patient progress and toleration of activity level   Outcome: Progressing     Problem: CARDIOVASCULAR - ADULT  Goal: Maintains optimal cardiac output and hemodynamic stability  Description: INTERVENTIONS:  - Monitor I/O, vital signs and rhythm  - Monitor for S/S and trends of decreased cardiac output  - Administer and titrate ordered vasoactive medications to optimize hemodynamic stability  - Assess quality of pulses, skin color and temperature  - Assess for signs of decreased coronary artery perfusion  - Instruct patient to report change in severity of symptoms  Outcome: Progressing     Problem: SKIN/TISSUE INTEGRITY - ADULT  Goal: Incision(s), wounds(s) or drain site(s) healing without S/S of infection  Description: INTERVENTIONS  - Assess and document dressing, incision, wound bed, drain sites and surrounding tissue  - Provide patient and family education  - Perform skin care/dressing changes as per order  Outcome: Progressing

## 2024-09-06 NOTE — ED PROVIDER NOTES
History  Chief Complaint   Patient presents with    Cellulitis     Hx of lymphedema from breast surgery, family dog bit this arm four days. Ago now more painful red and warm and feeling under the weather     Patient is a 78-year-old black female with history of breast cancer, left arm lymphedema, diabetes, hypertension who reports being bitten by her dog 4 days ago and of the left dorsal wrist.  She reports redness and warmth to this area today.  No fever or chills.  Dog is up-to-date on rabies immunizations.  Patient is unsure of her last tetanus shot.        Prior to Admission Medications   Prescriptions Last Dose Informant Patient Reported? Taking?   Diclofenac Sodium (VOLTAREN) 1 %  Self No No   Sig: Apply 2 g topically 4 (four) times a day   Multiple Vitamin (multivitamin) capsule  Self Yes No   Sig: Take 1 capsule by mouth daily   PARoxetine (PAXIL) 20 mg tablet   No No   Sig: TAKE 1 TABLET BY MOUTH EVERY DAY   acetaminophen (TYLENOL) 325 mg tablet  Self No No   Sig: Take 3 tablets (975 mg total) by mouth every 8 (eight) hours as needed for mild pain   amLODIPine (NORVASC) 10 mg tablet  Self No No   Sig: TAKE 1 TABLET BY MOUTH EVERY DAY   colchicine (COLCRYS) 0.6 mg tablet  Self No No   Sig: Take 2 tablets, then 1 hour later take 1 tablet   levothyroxine 25 mcg tablet  Self No No   Sig: TAKE 1 TABLET BY MOUTH EVERY DAY   lidocaine (LIDODERM) 5 %  Self No No   Sig: Apply 1 patch topically over 12 hours daily Remove & Discard patch within 12 hours or as directed by MD   Patient not taking: Reported on 7/25/2024   loperamide (IMODIUM) 2 mg capsule   No No   Sig: Take 1 capsule (2 mg total) by mouth 4 (four) times a day as needed for diarrhea   metFORMIN (GLUCOPHAGE) 1000 MG tablet   Yes No   Sig: Take 1,000 mg by mouth 2 (two) times a day      Facility-Administered Medications: None       Past Medical History:   Diagnosis Date    Acute encephalopathy     Breast cancer (HCC)     Diabetes mellitus (HCC)      Hypertension        Past Surgical History:   Procedure Laterality Date    HYSTERECTOMY      MASTECTOMY MODIFIED RADICAL Bilateral     REVERSE TOTAL SHOULDER ARTHROPLASTY Left 2/1/2024    Procedure: ARTHROPLASTY SHOULDER REVERSE, and all associated procedures;  Surgeon: Tigre Kaufman DO;  Location: [facility];  Service: Orthopedics    SPINE SURGERY      THYROIDECTOMY N/A 6/21/2023    Procedure: LEFT HEMITHYROIDECTOMY, SUBSTERNAL;  Surgeon: Leonel James MD;  Location: [facility];  Service: ENT       Family History   Problem Relation Age of Onset    Hypertension Father     Hypertension Sister     Hypertension Brother      I have reviewed and agree with the history as documented.    E-Cigarette/Vaping    E-Cigarette Use Never User      E-Cigarette/Vaping Substances    Nicotine No     THC No     CBD No     Flavoring No     Other No     Unknown No      Social History     Tobacco Use    Smoking status: Never     Passive exposure: Never    Smokeless tobacco: Never   Vaping Use    Vaping status: Never Used   Substance Use Topics    Alcohol use: Yes     Alcohol/week: 1.0 standard drink of alcohol     Types: 1 Glasses of wine per week    Drug use: Never       Review of Systems   Constitutional:  Negative for chills and fever.   Respiratory:  Negative for shortness of breath.    Cardiovascular:  Negative for chest pain.   Gastrointestinal:  Negative for abdominal pain.   Skin:  Positive for color change and wound.   Neurological:  Negative for numbness.       Physical Exam  Physical Exam  Vitals and nursing note reviewed.   Constitutional:       General: She is not in acute distress.     Appearance: Normal appearance. She is not ill-appearing, toxic-appearing or diaphoretic.   HENT:      Head: Normocephalic and atraumatic.   Cardiovascular:      Rate and Rhythm: Normal rate and regular rhythm.      Pulses: Normal pulses.      Heart sounds: Normal heart sounds.   Pulmonary:      Effort: Pulmonary effort is normal.       Breath sounds: Normal breath sounds.   Abdominal:      General: Abdomen is flat. Bowel sounds are normal.      Palpations: Abdomen is soft.   Musculoskeletal:      Comments: Moderate swelling of the left upper extremity (patient reports baseline for her from lymphedema).  2 puncture wounds to the dorsal left wrist.  There is erythema and warm of the distal left upper extremity.  Sensation intact.  Pulses intact.  Cap refill less than 2 seconds in all fingers left hand.   Skin:     General: Skin is warm and dry.      Capillary Refill: Capillary refill takes less than 2 seconds.   Neurological:      General: No focal deficit present.      Mental Status: She is alert and oriented to person, place, and time. Mental status is at baseline.         Vital Signs  ED Triage Vitals [09/06/24 1332]   Temperature Pulse Respirations Blood Pressure SpO2   99.5 °F (37.5 °C) (!) 54 20 157/70 98 %      Temp Source Heart Rate Source Patient Position - Orthostatic VS BP Location FiO2 (%)   Temporal Monitor Sitting Right arm --      Pain Score       7           Vitals:    09/06/24 1332 09/06/24 1649   BP: 157/70 (!) 185/78   Pulse: (!) 54 58   Patient Position - Orthostatic VS: Sitting Lying         Visual Acuity      ED Medications  Medications   sodium chloride 0.9 % infusion (125 mL/hr Intravenous New Bag 9/6/24 1641)   ampicillin-sulbactam (UNASYN) 3 g in sodium chloride 0.9 % 100 mL IVPB (0 g Intravenous Stopped 9/6/24 1609)       Diagnostic Studies  Results Reviewed       Procedure Component Value Units Date/Time    Lactic acid 2 Hours [[id number]]  (Abnormal) Collected: 09/06/24 1711    Lab Status: Final result Specimen: Blood from Arm, Right Updated: 09/06/24 1741     LACTIC ACID 3.0 mmol/L     Narrative:      Result may be elevated if tourniquet was used during collection.    Lactic acid, plasma (w/reflex if result > 2.0) [[id number]]  (Abnormal) Collected: 09/06/24 1508    Lab Status: Final result Specimen: Blood from Arm,  Right Updated: 09/06/24 1601     LACTIC ACID 3.6 mmol/L     Narrative:      Result may be elevated if tourniquet was used during collection.    Procalcitonin [[id number]]  (Abnormal) Collected: 09/06/24 1508    Lab Status: Final result Specimen: Blood from Arm, Right Updated: 09/06/24 1601     Procalcitonin 1.47 ng/ml     Comprehensive metabolic panel [[id number]]  (Abnormal) Collected: 09/06/24 1508    Lab Status: Final result Specimen: Blood from Arm, Right Updated: 09/06/24 1552     Sodium 132 mmol/L      Potassium 3.8 mmol/L      Chloride 100 mmol/L      CO2 19 mmol/L      ANION GAP 13 mmol/L      BUN 13 mg/dL      Creatinine 0.77 mg/dL      Glucose 229 mg/dL      Calcium 9.4 mg/dL      AST 16 U/L      ALT 10 U/L      Alkaline Phosphatase 102 U/L      Total Protein 6.8 g/dL      Albumin 3.5 g/dL      Total Bilirubin 0.59 mg/dL      eGFR 74 ml/min/1.73sq m     Narrative:      National Kidney Disease Foundation guidelines for Chronic Kidney Disease (CKD):     Stage 1 with normal or high GFR (GFR > 90 mL/min/1.73 square meters)    Stage 2 Mild CKD (GFR = 60-89 mL/min/1.73 square meters)    Stage 3A Moderate CKD (GFR = 45-59 mL/min/1.73 square meters)    Stage 3B Moderate CKD (GFR = 30-44 mL/min/1.73 square meters)    Stage 4 Severe CKD (GFR = 15-29 mL/min/1.73 square meters)    Stage 5 End Stage CKD (GFR <15 mL/min/1.73 square meters)  Note: GFR calculation is accurate only with a steady state creatinine    Blood culture #1 [[id number]] Collected: 09/06/24 1538    Lab Status: In process Specimen: Blood from Arm, Right Updated: 09/06/24 1549    Protime-INR [[id number]]  (Normal) Collected: 09/06/24 1508    Lab Status: Final result Specimen: Blood from Arm, Right Updated: 09/06/24 1546     Protime 13.6 seconds      INR 0.99    Narrative:      INR Therapeutic Range    Indication                                             INR Range      Atrial Fibrillation                                                2.0-3.0  Hypercoagulable State                                    2.0.2.3  Left Ventricular Asist Device                            2.0-3.0  Mechanical Heart Valve                                  -    Aortic(with afib, MI, embolism, HF, LA enlargement,    and/or coagulopathy)                                     2.0-3.0 (2.5-3.5)     Mitral                                                             2.5-3.5  Prosthetic/Bioprosthetic Heart Valve               2.0-3.0  Venous thromboembolism (VTE: VT, PE        2.0-3.0    APTT [[id number]]  (Normal) Collected: 09/06/24 1508    Lab Status: Final result Specimen: Blood from Arm, Right Updated: 09/06/24 1546     PTT 24 seconds     CBC and differential [[id number]]  (Abnormal) Collected: 09/06/24 1508    Lab Status: Final result Specimen: Blood from Arm, Right Updated: 09/06/24 1533     WBC 16.15 Thousand/uL      RBC 5.21 Million/uL      Hemoglobin 13.4 g/dL      Hematocrit 42.8 %      MCV 82 fL      MCH 25.7 pg      MCHC 31.3 g/dL      RDW 15.9 %      MPV 9.5 fL      Platelets 223 Thousands/uL     Blood culture #2 [[id number]] Collected: 09/06/24 1508    Lab Status: In process Specimen: Blood from Arm, Right Updated: 09/06/24 1521                   No orders to display              Procedures  Procedures         ED Course                                 SBIRT 20yo+      Flowsheet Row Most Recent Value   Initial Alcohol Screen: US AUDIT-C     1. How often do you have a drink containing alcohol? 0 Filed at: 09/06/2024 1428   2. How many drinks containing alcohol do you have on a typical day you are drinking?  0 Filed at: 09/06/2024 1428   3a. Male UNDER 65: How often do you have five or more drinks on one occasion? 0 Filed at: 09/06/2024 1428   3b. FEMALE Any Age, or MALE 65+: How often do you have 4 or more drinks on one occassion? 0 Filed at: 09/06/2024 1428   Audit-C Score 0 Filed at: 09/06/2024 1428   BECKA: How many times in the past year have you...    Used an illegal  drug or used a prescription medication for non-medical reasons? Never Filed at: 09/06/2024 1428                      Medical Decision Making  78-year-old black female with history of left arm lymphedema presenting with cellulitis in the lymphedematous arm.  Patient is 4 days status post dog bite to the distal forearm.  Labs reviewed.  White count is elevated at 16.15.  Sodium is 132.  Lactic acid 3.6.  IV Unasyn and IV crystalloid hydration given.  Patient maintaining good blood pressure in the ED.  I epic chatted hospitalist Dr. Tatum  for admission.     Amount and/or Complexity of Data Reviewed  Labs: ordered.    Risk  Prescription drug management.  Decision regarding hospitalization.                 Disposition  Final diagnoses:   Cellulitis   Lymphedema of left arm   Dog bite of left upper extremity, initial encounter     Time reflects when diagnosis was documented in both MDM as applicable and the Disposition within this note       Time User Action Codes Description Comment    9/6/2024  4:22 PM Bran Dutta [L03.90] Cellulitis     9/6/2024  4:23 PM Bran Dutta [I89.0] Lymphedema of left arm     9/6/2024  4:23 PM Bran Dutta [S41.152A,  W54.0XXA] Dog bite of left upper extremity, initial encounter           ED Disposition       ED Disposition   Admit    Condition   Stable    Date/Time   Fri Sep 6, 2024 7438    Comment   Case was discussed with Dr Tatum  and the patient's admission status was agreed to be Admission Status: inpatient status to the service of Dr. Tatum              Follow-up Information    None         Current Discharge Medication List        CONTINUE these medications which have NOT CHANGED    Details   acetaminophen (TYLENOL) 325 mg tablet Take 3 tablets (975 mg total) by mouth every 8 (eight) hours as needed for mild pain    Associated Diagnoses: Closed displaced fracture of surgical neck of left humerus, unspecified fracture morphology, initial encounter       amLODIPine (NORVASC) 10 mg tablet TAKE 1 TABLET BY MOUTH EVERY DAY  Qty: 90 tablet, Refills: 1    Associated Diagnoses: Essential hypertension      colchicine (COLCRYS) 0.6 mg tablet Take 2 tablets, then 1 hour later take 1 tablet  Qty: 3 tablet, Refills: 0    Associated Diagnoses: Gouty arthritis of left great toe      Diclofenac Sodium (VOLTAREN) 1 % Apply 2 g topically 4 (four) times a day  Qty: 150 g, Refills: 3    Associated Diagnoses: Closed displaced fracture of surgical neck of left humerus with routine healing, unspecified fracture morphology, subsequent encounter      levothyroxine 25 mcg tablet TAKE 1 TABLET BY MOUTH EVERY DAY  Qty: 90 tablet, Refills: 1    Associated Diagnoses: Acquired hypothyroidism      lidocaine (LIDODERM) 5 % Apply 1 patch topically over 12 hours daily Remove & Discard patch within 12 hours or as directed by MD  Qty: 15 patch, Refills: 0    Associated Diagnoses: Closed displaced fracture of surgical neck of left humerus, unspecified fracture morphology, initial encounter      loperamide (IMODIUM) 2 mg capsule Take 1 capsule (2 mg total) by mouth 4 (four) times a day as needed for diarrhea  Qty: 30 capsule, Refills: 0    Associated Diagnoses: Diarrhea, unspecified type      metFORMIN (GLUCOPHAGE) 1000 MG tablet Take 1,000 mg by mouth 2 (two) times a day      Multiple Vitamin (multivitamin) capsule Take 1 capsule by mouth daily      PARoxetine (PAXIL) 20 mg tablet TAKE 1 TABLET BY MOUTH EVERY DAY  Qty: 90 tablet, Refills: 1    Associated Diagnoses: Generalized anxiety disorder             No discharge procedures on file.    PDMP Review         Value Time User    PDMP Reviewed  Yes 2/15/2024  4:56 PM Tigre Kaufman DO            ED Provider  Electronically Signed by             Bran Dutta PA-C  09/06/24 7420

## 2024-09-06 NOTE — H&P
History and Physical - [facility] Internal Medicine    Patient Information: Eulalio Raphael 78 y.o. female MRN: [id number]  Unit/Bed#: [address] [place] [address] Encounter: [id number]  Admitting Physician: Riaz Tatum MD  PCP: Lovely Kim DO  Date of Admission:  09/06/24        Hospital Problem List:     Principal Problem:    Cellulitis  Active Problems:    Lactic acidosis    Essential hypertension    Type 2 diabetes mellitus without complication, without long-term current use of insulin (HCC)    Lymphedema of left arm    Acquired hypothyroidism    Generalized anxiety disorder      Assessment/Plan:    Lactic acidosis  Assessment & Plan  Lactic acid level 3.6 on presentation, improving to 3 post IV hydration  Likely secondary to metformin use  Patient is afebrile with leukocytosis.  Hemoglobin is stable  IV fluids  Monitor  Hold metformin    * Cellulitis  Assessment & Plan  Presented with increased erythema, warmth, tenderness superimposed on left upper extremity lymphedema status post family dog bite 4 days ago.  Dog is up-to-date with vaccination  Noted to have leukocytosis 16K.  Afebrile clinically stable on presentation  No evidence of deeper infection on exam  IV Unasyn  Monitor clinically  Evaluation  Follow-up CBC  Follow-up blood cultures    Lymphedema of left arm  Assessment & Plan  History of breast cancer status postmastectomy with subsequent left upper extremity lymphedema  Limb elevation/compression as tolerated    Type 2 diabetes mellitus without complication, without long-term current use of insulin (HCC)  Assessment & Plan  Lab Results   Component Value Date    HGBA1C 6.1 02/07/2024       Recent Labs     09/06/24  2101   POCGLU 202*       Blood Sugar Average: Last 72 hrs:  (P) 202    Hold metformin  Diabetic diet  Monitor blood glucose trend  Insulin sliding scale    Essential hypertension  Assessment & Plan  Elevated on presentation, improving  Continue home amlodipine    Generalized anxiety  disorder  Assessment & Plan  Continue paroxetine    Acquired hypothyroidism  Assessment & Plan  Continue levothyroxine            VTE Prophylaxis: Enoxaparin (Lovenox)    Code Status: Level 1 - Full Code    Anticipated Length of Stay:  Patient will be admitted on an Inpatient basis with an anticipated length of stay of  > 2 midnights.   Justification for Hospital Stay: Cellulitis status post dog bite superimposed on left upper extremity lymphedema requiring IV antibiotics    Total Time for Visit, including Counseling / Coordination of Care: 45 minutes.  Greater than 50% of this total time spent on direct patient counseling and coordination of care.  Discussed with family at bedside    Chief Complaint:     Cellulitis (Hx of lymphedema from breast surgery, family dog bit this arm four days. Ago now more painful red and warm and feeling under the weather)    History of Present Illness:    Eulalio Raphael is a 78 y.o. female with history of hypertension, diabetes mellitus type 2, post thyroidectomy hypothyroidism, breast CA status post mastectomy, chronic left upper extremity lymphedema who presents with erythema, increasing swelling associated with pain of left upper extremity after bitten by family dog 4 days ago.  Patient reported associated malaise and subjective fever.  Patient reported that dog is up-to-date with vaccination.  In ED, patient's vital signs were stable.  Workup revealed leukocytosis, elevated procalcitonin and lactic acid.  Mild hyponatremia and hyperglycemia noted.  Cultures were obtained, patient received IV Unasyn and was subsequently admitted.  Currently patient is comfortably sitting up in chair having dinner.  Reports swelling and heaviness of the left upper extremity.      Review of Systems:    Review of Systems   Constitutional:  Negative for appetite change.   HENT:  Negative for sore throat.    Respiratory:  Negative for cough and shortness of breath.    Cardiovascular:  Negative for chest  pain.   Gastrointestinal:  Negative for abdominal pain, constipation, nausea and vomiting.   Genitourinary:  Negative for dysuria.   Musculoskeletal:  Negative for joint swelling.   Neurological:  Negative for headaches.   Psychiatric/Behavioral:  Negative for confusion.        Past Medical and Surgical History:     Past Medical History:   Diagnosis Date    Acute encephalopathy     Breast cancer (HCC)     Diabetes mellitus (HCC)     Hypertension        Past Surgical History:   Procedure Laterality Date    HYSTERECTOMY      MASTECTOMY MODIFIED RADICAL Bilateral     REVERSE TOTAL SHOULDER ARTHROPLASTY Left 2/1/2024    Procedure: ARTHROPLASTY SHOULDER REVERSE, and all associated procedures;  Surgeon: Tigre Kaufman DO;  Location: [facility];  Service: Orthopedics    SPINE SURGERY      THYROIDECTOMY N/A 6/21/2023    Procedure: LEFT HEMITHYROIDECTOMY, SUBSTERNAL;  Surgeon: Leonel James MD;  Location: [facility];  Service: ENT       Meds/Allergies:    PTA meds:   Prior to Admission Medications   Prescriptions Last Dose Informant Patient Reported? Taking?   Diclofenac Sodium (VOLTAREN) 1 % Not Taking Self No No   Sig: Apply 2 g topically 4 (four) times a day   Patient not taking: Reported on 9/6/2024   Multiple Vitamin (multivitamin) capsule 9/6/2024 at 0700 Self Yes Yes   Sig: Take 1 capsule by mouth daily   PARoxetine (PAXIL) 20 mg tablet 9/6/2024 at 0700  No Yes   Sig: TAKE 1 TABLET BY MOUTH EVERY DAY   acetaminophen (TYLENOL) 325 mg tablet 9/6/2024 at 0700 Self No Yes   Sig: Take 3 tablets (975 mg total) by mouth every 8 (eight) hours as needed for mild pain   amLODIPine (NORVASC) 10 mg tablet 9/6/2024 at 0700 Self No Yes   Sig: TAKE 1 TABLET BY MOUTH EVERY DAY   colchicine (COLCRYS) 0.6 mg tablet Not Taking Self No No   Sig: Take 2 tablets, then 1 hour later take 1 tablet   Patient not taking: Reported on 9/6/2024   levothyroxine 25 mcg tablet 9/6/2024 at 0600 Self No Yes   Sig: TAKE 1 TABLET BY MOUTH  EVERY DAY   lidocaine (LIDODERM) 5 % Not Taking Self No No   Sig: Apply 1 patch topically over 12 hours daily Remove & Discard patch within 12 hours or as directed by MD   Patient not taking: Reported on 7/25/2024   loperamide (IMODIUM) 2 mg capsule Not Taking  No No   Sig: Take 1 capsule (2 mg total) by mouth 4 (four) times a day as needed for diarrhea   Patient not taking: Reported on 9/6/2024   metFORMIN (GLUCOPHAGE) 1000 MG tablet 9/5/2024  Yes Yes   Sig: Take 1,000 mg by mouth 2 (two) times a day      Facility-Administered Medications: None       Allergies:   Allergies   Allergen Reactions    Ace Inhibitors Angioedema    Erythromycin Swelling     History:     Marital Status:      Substance Use History:   Social History     Substance and Sexual Activity   Alcohol Use Yes    Alcohol/week: 1.0 standard drink of alcohol    Types: 1 Glasses of wine per week     Social History     Tobacco Use   Smoking Status Never    Passive exposure: Never   Smokeless Tobacco Never     Social History     Substance and Sexual Activity   Drug Use Never       Family History:    Family History   Problem Relation Age of Onset    Hypertension Father     Hypertension Sister     Hypertension Brother        Physical Exam:     Vitals:   Blood Pressure: (!) 185/78 (09/06/24 1649)  Pulse: 58 (09/06/24 1649)  Temperature: 97.6 °F (36.4 °C) (09/06/24 1649)  Temp Source: Oral (09/06/24 1649)  Respirations: 20 (09/06/24 1649)  SpO2: 99 % (09/06/24 1649)    Physical Exam  Constitutional:       General: She is not in acute distress.  HENT:      Head: Normocephalic and atraumatic.      Nose: Nose normal.   Eyes:      Pupils: Pupils are equal, round, and reactive to light.   Cardiovascular:      Rate and Rhythm: Normal rate and regular rhythm.      Heart sounds: Normal heart sounds.   Pulmonary:      Effort: Pulmonary effort is normal. No respiratory distress.      Breath sounds: Normal breath sounds. No wheezing or rales.   Abdominal:       "General: Bowel sounds are normal. There is no distension.      Palpations: Abdomen is soft.      Tenderness: There is no abdominal tenderness. There is no guarding or rebound.   Musculoskeletal:         General: Swelling (Left upper extremity lymphedema) present.      Comments: Diffuse erythema increased warmth tenderness over left upper extremity extending up arm, no fluctuation or painful range of motion at joints   Skin:     General: Skin is warm and dry.      Findings: No rash.   Neurological:      Mental Status: She is alert.      Cranial Nerves: No cranial nerve deficit.                 Lab Results: I have personally reviewed pertinent reports.      Results from last 7 days   Lab Units 09/06/24  1508   WBC Thousand/uL 16.15*   HEMOGLOBIN g/dL 13.4   HEMATOCRIT % 42.8   PLATELETS Thousands/uL 223     Results from last 7 days   Lab Units 09/06/24  1508   POTASSIUM mmol/L 3.8   CHLORIDE mmol/L 100   CO2 mmol/L 19*   BUN mg/dL 13   CREATININE mg/dL 0.77   CALCIUM mg/dL 9.4   ALK PHOS U/L 102   ALT U/L 10   AST U/L 16     Results from last 7 days   Lab Units 09/06/24  1508   INR  0.99       Imaging: I have personally reviewed pertinent reports.      No results found.    No orders to display       EKG, Pathology, and Other Studies Reviewed on Admission:   EKG-none    Allscripts/EPIC Records Reviewed: Yes     ** Please Note: \"This note has been constructed using a voice recognition system.Therefore there may be syntax, spelling, and/or grammatical errors. Please call if you have any questions. \"**      "

## 2024-09-07 PROBLEM — E87.20 LACTIC ACIDOSIS: Status: RESOLVED | Noted: 2024-09-06 | Resolved: 2024-09-07

## 2024-09-07 LAB
ANION GAP SERPL CALCULATED.3IONS-SCNC: 7 MMOL/L (ref 4–13)
BUN SERPL-MCNC: 12 MG/DL (ref 5–25)
CALCIUM SERPL-MCNC: 9.3 MG/DL (ref 8.4–10.2)
CHLORIDE SERPL-SCNC: 105 MMOL/L (ref 96–108)
CO2 SERPL-SCNC: 27 MMOL/L (ref 21–32)
CREAT SERPL-MCNC: 0.79 MG/DL (ref 0.6–1.3)
ERYTHROCYTE [DISTWIDTH] IN BLOOD BY AUTOMATED COUNT: 15.8 % (ref 11.6–15.1)
EST. AVERAGE GLUCOSE BLD GHB EST-MCNC: 131 MG/DL
GFR SERPL CREATININE-BSD FRML MDRD: 72 ML/MIN/1.73SQ M
GLUCOSE SERPL-MCNC: 109 MG/DL (ref 65–140)
GLUCOSE SERPL-MCNC: 127 MG/DL (ref 65–140)
GLUCOSE SERPL-MCNC: 129 MG/DL (ref 65–140)
GLUCOSE SERPL-MCNC: 136 MG/DL (ref 65–140)
GLUCOSE SERPL-MCNC: 144 MG/DL (ref 65–140)
HBA1C MFR BLD: 6.2 %
HCT VFR BLD AUTO: 39.2 % (ref 34.8–46.1)
HGB BLD-MCNC: 12.3 G/DL (ref 11.5–15.4)
LACTATE SERPL-SCNC: 1.7 MMOL/L (ref 0.5–2)
MCH RBC QN AUTO: 25.4 PG (ref 26.8–34.3)
MCHC RBC AUTO-ENTMCNC: 31.4 G/DL (ref 31.4–37.4)
MCV RBC AUTO: 81 FL (ref 82–98)
PLATELET # BLD AUTO: 200 THOUSANDS/UL (ref 149–390)
PMV BLD AUTO: 9.5 FL (ref 8.9–12.7)
POTASSIUM SERPL-SCNC: 3.6 MMOL/L (ref 3.5–5.3)
PROCALCITONIN SERPL-MCNC: 3.39 NG/ML
RBC # BLD AUTO: 4.85 MILLION/UL (ref 3.81–5.12)
SODIUM SERPL-SCNC: 139 MMOL/L (ref 135–147)
WBC # BLD AUTO: 17.27 THOUSAND/UL (ref 4.31–10.16)

## 2024-09-07 PROCEDURE — 99232 SBSQ HOSP IP/OBS MODERATE 35: CPT | Performed by: INTERNAL MEDICINE

## 2024-09-07 PROCEDURE — 80048 BASIC METABOLIC PNL TOTAL CA: CPT | Performed by: INTERNAL MEDICINE

## 2024-09-07 PROCEDURE — 84145 PROCALCITONIN (PCT): CPT | Performed by: INTERNAL MEDICINE

## 2024-09-07 PROCEDURE — 83605 ASSAY OF LACTIC ACID: CPT | Performed by: INTERNAL MEDICINE

## 2024-09-07 PROCEDURE — 83036 HEMOGLOBIN GLYCOSYLATED A1C: CPT | Performed by: INTERNAL MEDICINE

## 2024-09-07 PROCEDURE — 82948 REAGENT STRIP/BLOOD GLUCOSE: CPT

## 2024-09-07 PROCEDURE — 85027 COMPLETE CBC AUTOMATED: CPT | Performed by: INTERNAL MEDICINE

## 2024-09-07 RX ADMIN — ACETAMINOPHEN 650 MG: 325 TABLET ORAL at 17:00

## 2024-09-07 RX ADMIN — AMPICILLIN SODIUM AND SULBACTAM SODIUM 3 G: 2; 1 INJECTION, POWDER, FOR SOLUTION INTRAMUSCULAR; INTRAVENOUS at 17:00

## 2024-09-07 RX ADMIN — AMPICILLIN SODIUM AND SULBACTAM SODIUM 3 G: 2; 1 INJECTION, POWDER, FOR SOLUTION INTRAMUSCULAR; INTRAVENOUS at 05:57

## 2024-09-07 RX ADMIN — ENOXAPARIN SODIUM 40 MG: 40 INJECTION SUBCUTANEOUS at 08:51

## 2024-09-07 RX ADMIN — PAROXETINE 20 MG: 20 TABLET, FILM COATED ORAL at 08:51

## 2024-09-07 RX ADMIN — LEVOTHYROXINE SODIUM 25 MCG: 25 TABLET ORAL at 08:51

## 2024-09-07 RX ADMIN — B-COMPLEX W/ C & FOLIC ACID TAB 1 TABLET: TAB at 08:51

## 2024-09-07 RX ADMIN — AMPICILLIN SODIUM AND SULBACTAM SODIUM 3 G: 2; 1 INJECTION, POWDER, FOR SOLUTION INTRAMUSCULAR; INTRAVENOUS at 11:57

## 2024-09-07 RX ADMIN — KETOROLAC TROMETHAMINE 15 MG: 30 INJECTION, SOLUTION INTRAMUSCULAR; INTRAVENOUS at 19:49

## 2024-09-07 RX ADMIN — AMPICILLIN SODIUM AND SULBACTAM SODIUM 3 G: 2; 1 INJECTION, POWDER, FOR SOLUTION INTRAMUSCULAR; INTRAVENOUS at 00:04

## 2024-09-07 RX ADMIN — AMLODIPINE BESYLATE 10 MG: 10 TABLET ORAL at 08:51

## 2024-09-07 RX ADMIN — AMPICILLIN SODIUM AND SULBACTAM SODIUM 3 G: 2; 1 INJECTION, POWDER, FOR SOLUTION INTRAMUSCULAR; INTRAVENOUS at 23:44

## 2024-09-07 NOTE — ASSESSMENT & PLAN NOTE
Lab Results   Component Value Date    HGBA1C 6.1 02/07/2024       Recent Labs     09/06/24  2101 09/07/24  0712   POCGLU 202* 127       Blood Sugar Average: Last 72 hrs:  (P) 164.5  Acceptable  Hold metformin  Diabetic diet  Monitor blood glucose trend  Insulin sliding scale

## 2024-09-07 NOTE — PROGRESS NOTES
Critical access hospital  Progress Note  Name: Eulalio Rpahael I  MRN: [id number]  Unit/Bed#: 2 [address] Date of Admission: 9/6/2024   Date of Service: 9/7/2024 I Hospital Day: 1    Assessment & Plan   * Cellulitis  Assessment & Plan  Presented with increased erythema, warmth, tenderness superimposed on left upper extremity lymphedema status post family dog bite 4 days ago.  Dog is up-to-date with vaccination  Low-grade fever, remains with leukocytosis, appears to have mild clinical improvement  Procalcitonin is elevated  No evidence of deeper infection on exam  Continue IV Unasyn  Monitor clinically  Left upper extremity elevation as tolerated  Follow-up CBC  Follow-up blood cultures    Lactic acidosis-resolved as of 9/7/2024  Assessment & Plan  Lactic acid level 3.6 on presentation, improving to 3 post IV hydration  Likely secondary to metformin use  Patient is afebrile with leukocytosis.  Hemoglobin is stable  IV fluids  Monitor  Hold metformin    Lymphedema of left arm  Assessment & Plan  History of breast cancer status postmastectomy with subsequent left upper extremity lymphedema  Limb elevation/compression as tolerated    Type 2 diabetes mellitus without complication, without long-term current use of insulin ([facility])  Assessment & Plan  Lab Results   Component Value Date    HGBA1C 6.1 02/07/2024       Recent Labs     09/06/24  2101 09/07/24  0712   POCGLU 202* 127       Blood Sugar Average: Last 72 hrs:  (P) 164.5  Acceptable  Hold metformin  Diabetic diet  Monitor blood glucose trend  Insulin sliding scale    Essential hypertension  Assessment & Plan  Elevated on presentation, improving  Continue home amlodipine    Generalized anxiety disorder  Assessment & Plan  Continue paroxetine    Acquired hypothyroidism  Assessment & Plan  Continue levothyroxine        Blood Sugar Average: Last 72 hrs:  (P) 202    Hold metformin  Diabetic diet  Monitor blood glucose trend  Insulin sliding scale    VTE  Pharmacologic Prophylaxis: VTE Score: 5 Moderate Risk (Score 3-4) - Pharmacological DVT Prophylaxis Ordered: enoxaparin (Lovenox).    Mobility:   Basic Mobility Inpatient Raw Score: 19  JH-HLM Goal: 6: Walk 10 steps or more  JH-HLM Achieved: 6: Walk 10 steps or more  JH-HLM Goal achieved. Continue to encourage appropriate mobility.    Patient Centered Rounds: I performed bedside rounds with nursing staff today.   Discussions with Specialists or Other Care Team Provider: Yes    Education and Discussions with Family / Patient: Updated  (daughter) at bedside.    Total Time Spent on Date of Encounter in care of patient: 35 mins. This time was spent on one or more of the following: performing physical exam; counseling and coordination of care; obtaining or reviewing history; documenting in the medical record; reviewing/ordering tests, medications or procedures; communicating with other healthcare professionals and discussing with patient's family/caregivers.    Current Length of Stay: 1 day(s)  Current Patient Status: Inpatient   Certification Statement: The patient will continue to require additional inpatient hospital stay due to IV antibiotics  Discharge Plan: Anticipate discharge in 48 hrs to home.    Code Status: Level 1 - Full Code    Subjective:   Reports that she feels overall better, still with pain/discomfort involving left upper extremity but feels improved      Remains with low-grade fever and leukocytosis other vital signs stable.    Objective:     Vitals:   Temp (24hrs), Av.7 °F (37.1 °C), Min:97.6 °F (36.4 °C), Max:100.1 °F (37.8 °C)    Temp:  [97.6 °F (36.4 °C)-100.1 °F (37.8 °C)] 97.8 °F (36.6 °C)  HR:  [51-64] 58  Resp:  [16-20] 16  BP: (136-185)/(45-78) 150/58  SpO2:  [96 %-99 %] 98 %  Body mass index is 26.37 kg/m².     Input and Output Summary (last 24 hours):     Intake/Output Summary (Last 24 hours) at 2024 3605  Last data filed at 2024 1609  Gross per 24 hour   Intake 100  ml   Output --   Net 100 ml       Physical Exam:   Physical Exam  Constitutional:       General: She is not in acute distress.  HENT:      Head: Normocephalic and atraumatic.   Cardiovascular:      Rate and Rhythm: Normal rate.   Pulmonary:      Effort: Pulmonary effort is normal. No respiratory distress.      Breath sounds: Normal breath sounds. No wheezing or rales.   Abdominal:      General: Bowel sounds are normal. There is no distension.      Palpations: Abdomen is soft.      Tenderness: There is no abdominal tenderness. There is no guarding or rebound.   Musculoskeletal:         General: Swelling present.      Comments: Improved erythema, still with increased warmth tenderness over left upper extremity extending up arm, no fluctuation or painful range of motion at joints    Skin:     General: Skin is warm and dry.      Findings: Erythema present. No rash.   Neurological:      Mental Status: She is alert. Mental status is at baseline.      Cranial Nerves: No cranial nerve deficit.          Additional Data:     Labs:  Results from last 7 days   Lab Units 09/07/24  0522   WBC Thousand/uL 17.27*   HEMOGLOBIN g/dL 12.3   HEMATOCRIT % 39.2   PLATELETS Thousands/uL 200     Results from last 7 days   Lab Units 09/07/24  0522 09/06/24  1508   SODIUM mmol/L 139 132*   POTASSIUM mmol/L 3.6 3.8   CHLORIDE mmol/L 105 100   CO2 mmol/L 27 19*   BUN mg/dL 12 13   CREATININE mg/dL 0.79 0.77   ANION GAP mmol/L 7 13   CALCIUM mg/dL 9.3 9.4   ALBUMIN g/dL  --  3.5   TOTAL BILIRUBIN mg/dL  --  0.59   ALK PHOS U/L  --  102   ALT U/L  --  10   AST U/L  --  16   GLUCOSE RANDOM mg/dL 109 229*     Results from last 7 days   Lab Units 09/06/24  1508   INR  0.99     Results from last 7 days   Lab Units 09/07/24  0712 09/06/24  2101   POC GLUCOSE mg/dl 127 202*         Results from last 7 days   Lab Units 09/07/24  0522 09/06/24  1711 09/06/24  1508   LACTIC ACID mmol/L 1.7 3.0* 3.6*   PROCALCITONIN ng/ml 3.39*  --  1.47*        Lines/Drains:  Invasive Devices       Peripheral Intravenous Line  Duration             Peripheral IV 09/06/24 Distal;Right;Upper;Ventral (anterior) Arm <1 day                          Imaging: No pertinent imaging reviewed.    Recent Cultures (last 7 days):   Results from last 7 days   Lab Units 09/06/24  1538 09/06/24  1508   BLOOD CULTURE  Received in Microbiology Lab. Culture in Progress. Received in Microbiology Lab. Culture in Progress.       Last 24 Hours Medication List:   Current Facility-Administered Medications   Medication Dose Route Frequency Provider Last Rate    acetaminophen  650 mg Oral Q6H PRN Riaz Tatum MD      amLODIPine  10 mg Oral Daily Riaz Tatum MD      ampicillin-sulbactam  3 g Intravenous Q6H Riaz Tatum MD 3 g (09/07/24 2647)    enoxaparin  40 mg Subcutaneous Daily Riaz Tatum MD      insulin lispro  1-5 Units Subcutaneous TID AC Riaz Tatum MD      insulin lispro  1-5 Units Subcutaneous HS Riaz Tatum MD      ketorolac  15 mg Intravenous Q6H PRN Riaz Tatum MD      levothyroxine  25 mcg Oral Daily Riaz Tatum MD      multivitamin stress formula  1 tablet Oral Daily Riaz Tatum MD      PARoxetine  20 mg Oral Daily Riaz Tatum MD          Today, Patient Was Seen By: Riaz Tatum MD    **Please Note: This note may have been constructed using a voice recognition system.**

## 2024-09-07 NOTE — ASSESSMENT & PLAN NOTE
History of breast cancer status postmastectomy with subsequent left upper extremity lymphedema  Limb elevation/compression as tolerated

## 2024-09-07 NOTE — PLAN OF CARE
Problem: Potential for Falls  Goal: Patient will remain free of falls  Description: INTERVENTIONS:  - Educate patient/family on patient safety including physical limitations  - Instruct patient to call for assistance with activity   - Consult OT/PT to assist with strengthening/mobility   - Keep Call bell within reach  - Keep bed low and locked with side rails adjusted as appropriate  - Keep care items and personal belongings within reach  - Initiate and maintain comfort rounds  - Make Fall Risk Sign visible to staff  - Offer Toileting every 2 Hours, in advance of need  - Initiate/Maintain bed alarm  - Apply yellow socks and bracelet for high fall risk patients  - Consider moving patient to room near nurses station  Outcome: Progressing     Problem: PAIN - ADULT  Goal: Verbalizes/displays adequate comfort level or baseline comfort level  Description: Interventions:  - Encourage patient to monitor pain and request assistance  - Assess pain using appropriate pain scale  - Administer analgesics based on type and severity of pain and evaluate response  - Implement non-pharmacological measures as appropriate and evaluate response  - Consider cultural and social influences on pain and pain management  - Notify physician/advanced practitioner if interventions unsuccessful or patient reports new pain  Outcome: Progressing     Problem: INFECTION - ADULT  Goal: Absence or prevention of progression during hospitalization  Description: INTERVENTIONS:  - Assess and monitor for signs and symptoms of infection  - Monitor lab/diagnostic results  - Monitor all insertion sites, i.e. indwelling lines, tubes, and drains  - Monitor endotracheal if appropriate and nasal secretions for changes in amount and color  - [place] appropriate cooling/warming therapies per order  - Administer medications as ordered  - Instruct and encourage patient and family to use good hand hygiene technique  - Identify and instruct in appropriate isolation  precautions for identified infection/condition  Outcome: Progressing     Problem: SAFETY ADULT  Goal: Patient will remain free of falls  Description: INTERVENTIONS:  - Educate patient/family on patient safety including physical limitations  - Instruct patient to call for assistance with activity   - Consult OT/PT to assist with strengthening/mobility   - Keep Call bell within reach  - Keep bed low and locked with side rails adjusted as appropriate  - Keep care items and personal belongings within reach  - Initiate and maintain comfort rounds  - Make Fall Risk Sign visible to staff  - Offer Toileting every 2 Hours, in advance of need  - Initiate/Maintain bed alarm  - Apply yellow socks and bracelet for high fall risk patients  - Consider moving patient to room near nurses station  Outcome: Progressing  Goal: Maintain or return to baseline ADL function  Description: INTERVENTIONS:  -  Assess patient's ability to carry out ADLs; assess patient's baseline for ADL function and identify physical deficits which impact ability to perform ADLs (bathing, care of mouth/teeth, toileting, grooming, dressing, etc.)  - Assess/evaluate cause of self-care deficits   - Assess range of motion  - Assess patient's mobility; develop plan if impaired  - Assess patient's need for assistive devices and provide as appropriate  - Encourage maximum independence but intervene and supervise when necessary  - Involve family in performance of ADLs  - Assess for home care needs following discharge   - Consider OT consult to assist with ADL evaluation and planning for discharge  - Provide patient education as appropriate  Outcome: Progressing  Goal: Maintains/Returns to pre admission functional level  Description: INTERVENTIONS:  - Perform AM-PAC 6 Click Basic Mobility/ Daily Activity assessment daily.  - Set and communicate daily mobility goal to care team and patient/family/caregiver.   - Collaborate with rehabilitation services on mobility goals  if consulted  - Perform Range of Motion 3 times a day.  - Reposition patient every 2 hours.  - Dangle patient 3 times a day  - Stand patient 4 times a day  - Ambulate patient 3 times a day  - Out of bed to chair 3 times a day   - Out of bed for meals 3 times a day  - Out of bed for toileting  - Record patient progress and toleration of activity level   Outcome: Progressing     Problem: CARDIOVASCULAR - ADULT  Goal: Maintains optimal cardiac output and hemodynamic stability  Description: INTERVENTIONS:  - Monitor I/O, vital signs and rhythm  - Monitor for S/S and trends of decreased cardiac output  - Administer and titrate ordered vasoactive medications to optimize hemodynamic stability  - Assess quality of pulses, skin color and temperature  - Assess for signs of decreased coronary artery perfusion  - Instruct patient to report change in severity of symptoms  Outcome: Progressing     Problem: SKIN/TISSUE INTEGRITY - ADULT  Goal: Incision(s), wounds(s) or drain site(s) healing without S/S of infection  Description: INTERVENTIONS  - Assess and document dressing, incision, wound bed, drain sites and surrounding tissue  - Provide patient and family education  - Perform skin care/dressing changes as per order  Outcome: Progressing

## 2024-09-07 NOTE — ASSESSMENT & PLAN NOTE
Lactic acid level 3.6 on presentation, improving to 3 post IV hydration  Likely secondary to metformin use  Patient is afebrile with leukocytosis.  Hemoglobin is stable  IV fluids  Monitor  Hold metformin

## 2024-09-07 NOTE — PLAN OF CARE
Problem: Potential for Falls  Goal: Patient will remain free of falls  Description: INTERVENTIONS:  - Educate patient/family on patient safety including physical limitations  - Instruct patient to call for assistance with activity   - Consult OT/PT to assist with strengthening/mobility   - Keep Call bell within reach  - Keep bed low and locked with side rails adjusted as appropriate  - Keep care items and personal belongings within reach  - Initiate and maintain comfort rounds  - Make Fall Risk Sign visible to staff  - Offer Toileting every 2 Hours, in advance of need  - Initiate/Maintain bed alarm  - Apply yellow socks and bracelet for high fall risk patients  - Consider moving patient to room near nurses station  Outcome: Progressing     Problem: PAIN - ADULT  Goal: Verbalizes/displays adequate comfort level or baseline comfort level  Description: Interventions:  - Encourage patient to monitor pain and request assistance  - Assess pain using appropriate pain scale  - Administer analgesics based on type and severity of pain and evaluate response  - Implement non-pharmacological measures as appropriate and evaluate response  - Consider cultural and social influences on pain and pain management  - Notify physician/advanced practitioner if interventions unsuccessful or patient reports new pain  Outcome: Progressing     Problem: INFECTION - ADULT  Goal: Absence or prevention of progression during hospitalization  Description: INTERVENTIONS:  - Assess and monitor for signs and symptoms of infection  - Monitor lab/diagnostic results  - Monitor all insertion sites, i.e. indwelling lines, tubes, and drains  - Monitor endotracheal if appropriate and nasal secretions for changes in amount and color  - [place] appropriate cooling/warming therapies per order  - Administer medications as ordered  - Instruct and encourage patient and family to use good hand hygiene technique  - Identify and instruct in appropriate isolation  precautions for identified infection/condition  Outcome: Progressing     Problem: SAFETY ADULT  Goal: Patient will remain free of falls  Description: INTERVENTIONS:  - Educate patient/family on patient safety including physical limitations  - Instruct patient to call for assistance with activity   - Consult OT/PT to assist with strengthening/mobility   - Keep Call bell within reach  - Keep bed low and locked with side rails adjusted as appropriate  - Keep care items and personal belongings within reach  - Initiate and maintain comfort rounds  - Make Fall Risk Sign visible to staff  - Offer Toileting every 2 Hours, in advance of need  - Initiate/Maintain bed alarm  - Apply yellow socks and bracelet for high fall risk patients  - Consider moving patient to room near nurses station  Outcome: Progressing  Goal: Maintain or return to baseline ADL function  Description: INTERVENTIONS:  -  Assess patient's ability to carry out ADLs; assess patient's baseline for ADL function and identify physical deficits which impact ability to perform ADLs (bathing, care of mouth/teeth, toileting, grooming, dressing, etc.)  - Assess/evaluate cause of self-care deficits   - Assess range of motion  - Assess patient's mobility; develop plan if impaired  - Assess patient's need for assistive devices and provide as appropriate  - Encourage maximum independence but intervene and supervise when necessary  - Involve family in performance of ADLs  - Assess for home care needs following discharge   - Consider OT consult to assist with ADL evaluation and planning for discharge  - Provide patient education as appropriate  Outcome: Progressing  Goal: Maintains/Returns to pre admission functional level  Description: INTERVENTIONS:  - Perform AM-PAC 6 Click Basic Mobility/ Daily Activity assessment daily.  - Set and communicate daily mobility goal to care team and patient/family/caregiver.   - Collaborate with rehabilitation services on mobility goals  if consulted  - Perform Range of Motion 3 times a day.  - Reposition patient every 2 hours.  - Dangle patient 3 times a day  - Stand patient 4 times a day  - Ambulate patient 3 times a day  - Out of bed to chair 3 times a day   - Out of bed for meals 3 times a day  - Out of bed for toileting  - Record patient progress and toleration of activity level   Outcome: Progressing     Problem: CARDIOVASCULAR - ADULT  Goal: Maintains optimal cardiac output and hemodynamic stability  Description: INTERVENTIONS:  - Monitor I/O, vital signs and rhythm  - Monitor for S/S and trends of decreased cardiac output  - Administer and titrate ordered vasoactive medications to optimize hemodynamic stability  - Assess quality of pulses, skin color and temperature  - Assess for signs of decreased coronary artery perfusion  - Instruct patient to report change in severity of symptoms  Outcome: Progressing     Problem: SKIN/TISSUE INTEGRITY - ADULT  Goal: Incision(s), wounds(s) or drain site(s) healing without S/S of infection  Description: INTERVENTIONS  - Assess and document dressing, incision, wound bed, drain sites and surrounding tissue  - Provide patient and family education  - Perform skin care/dressing changes as per order  Outcome: Progressing

## 2024-09-07 NOTE — ASSESSMENT & PLAN NOTE
Presented with increased erythema, warmth, tenderness superimposed on left upper extremity lymphedema status post family dog bite 4 days ago.  Dog is up-to-date with vaccination  Low-grade fever, remains with leukocytosis, appears to have mild clinical improvement  Procalcitonin is elevated  No evidence of deeper infection on exam  Continue IV Unasyn  Monitor clinically  Left upper extremity elevation as tolerated  Follow-up CBC  Follow-up blood cultures

## 2024-09-08 LAB
ANION GAP SERPL CALCULATED.3IONS-SCNC: 5 MMOL/L (ref 4–13)
BUN SERPL-MCNC: 10 MG/DL (ref 5–25)
CALCIUM SERPL-MCNC: 9.4 MG/DL (ref 8.4–10.2)
CHLORIDE SERPL-SCNC: 106 MMOL/L (ref 96–108)
CO2 SERPL-SCNC: 29 MMOL/L (ref 21–32)
CREAT SERPL-MCNC: 0.71 MG/DL (ref 0.6–1.3)
ERYTHROCYTE [DISTWIDTH] IN BLOOD BY AUTOMATED COUNT: 16 % (ref 11.6–15.1)
GFR SERPL CREATININE-BSD FRML MDRD: 82 ML/MIN/1.73SQ M
GLUCOSE SERPL-MCNC: 110 MG/DL (ref 65–140)
GLUCOSE SERPL-MCNC: 134 MG/DL (ref 65–140)
GLUCOSE SERPL-MCNC: 146 MG/DL (ref 65–140)
GLUCOSE SERPL-MCNC: 151 MG/DL (ref 65–140)
GLUCOSE SERPL-MCNC: 94 MG/DL (ref 65–140)
HCT VFR BLD AUTO: 40.6 % (ref 34.8–46.1)
HGB BLD-MCNC: 12.9 G/DL (ref 11.5–15.4)
MCH RBC QN AUTO: 25.7 PG (ref 26.8–34.3)
MCHC RBC AUTO-ENTMCNC: 31.8 G/DL (ref 31.4–37.4)
MCV RBC AUTO: 81 FL (ref 82–98)
PLATELET # BLD AUTO: 199 THOUSANDS/UL (ref 149–390)
PMV BLD AUTO: 9.7 FL (ref 8.9–12.7)
POTASSIUM SERPL-SCNC: 3.6 MMOL/L (ref 3.5–5.3)
RBC # BLD AUTO: 5.01 MILLION/UL (ref 3.81–5.12)
SODIUM SERPL-SCNC: 140 MMOL/L (ref 135–147)
WBC # BLD AUTO: 16.02 THOUSAND/UL (ref 4.31–10.16)

## 2024-09-08 PROCEDURE — 85027 COMPLETE CBC AUTOMATED: CPT | Performed by: INTERNAL MEDICINE

## 2024-09-08 PROCEDURE — 80048 BASIC METABOLIC PNL TOTAL CA: CPT | Performed by: INTERNAL MEDICINE

## 2024-09-08 PROCEDURE — 82948 REAGENT STRIP/BLOOD GLUCOSE: CPT

## 2024-09-08 PROCEDURE — 99232 SBSQ HOSP IP/OBS MODERATE 35: CPT | Performed by: INTERNAL MEDICINE

## 2024-09-08 RX ADMIN — AMPICILLIN SODIUM AND SULBACTAM SODIUM 3 G: 2; 1 INJECTION, POWDER, FOR SOLUTION INTRAMUSCULAR; INTRAVENOUS at 12:01

## 2024-09-08 RX ADMIN — ENOXAPARIN SODIUM 40 MG: 40 INJECTION SUBCUTANEOUS at 08:19

## 2024-09-08 RX ADMIN — B-COMPLEX W/ C & FOLIC ACID TAB 1 TABLET: TAB at 08:15

## 2024-09-08 RX ADMIN — AMPICILLIN SODIUM AND SULBACTAM SODIUM 3 G: 2; 1 INJECTION, POWDER, FOR SOLUTION INTRAMUSCULAR; INTRAVENOUS at 05:08

## 2024-09-08 RX ADMIN — LEVOTHYROXINE SODIUM 25 MCG: 25 TABLET ORAL at 08:15

## 2024-09-08 RX ADMIN — AMPICILLIN SODIUM AND SULBACTAM SODIUM 3 G: 2; 1 INJECTION, POWDER, FOR SOLUTION INTRAMUSCULAR; INTRAVENOUS at 17:34

## 2024-09-08 RX ADMIN — AMPICILLIN SODIUM AND SULBACTAM SODIUM 3 G: 2; 1 INJECTION, POWDER, FOR SOLUTION INTRAMUSCULAR; INTRAVENOUS at 23:37

## 2024-09-08 RX ADMIN — AMLODIPINE BESYLATE 10 MG: 10 TABLET ORAL at 08:19

## 2024-09-08 RX ADMIN — PAROXETINE 20 MG: 20 TABLET, FILM COATED ORAL at 08:16

## 2024-09-08 NOTE — ASSESSMENT & PLAN NOTE
Presented with increased erythema, warmth, tenderness superimposed on left upper extremity lymphedema status post family dog bite 4 days ago.  Dog is up-to-date with vaccination  Afebrile, leukocytosis improved.  Blood cultures are negative.  Left upper extremity symptoms now improved to baseline.  Erythema resolved, mild induration.  No evidence of deeper infection on exam  Treated with IV Unasyn, transition to Augmentin to complete 7 days  Monitor clinically  Limb elevation/compression as tolerated  Follow-up with PCP

## 2024-09-08 NOTE — PLAN OF CARE
Problem: Potential for Falls  Goal: Patient will remain free of falls  Description: INTERVENTIONS:  - Educate patient/family on patient safety including physical limitations  - Instruct patient to call for assistance with activity   - Consult OT/PT to assist with strengthening/mobility   - Keep Call bell within reach  - Keep bed low and locked with side rails adjusted as appropriate  - Keep care items and personal belongings within reach  - Initiate and maintain comfort rounds  - Make Fall Risk Sign visible to staff  - Offer Toileting every 2 Hours, in advance of need  - Initiate/Maintain bed alarm  - Apply yellow socks and bracelet for high fall risk patients  - Consider moving patient to room near nurses station  Outcome: Progressing     Problem: PAIN - ADULT  Goal: Verbalizes/displays adequate comfort level or baseline comfort level  Description: Interventions:  - Encourage patient to monitor pain and request assistance  - Assess pain using appropriate pain scale  - Administer analgesics based on type and severity of pain and evaluate response  - Implement non-pharmacological measures as appropriate and evaluate response  - Consider cultural and social influences on pain and pain management  - Notify physician/advanced practitioner if interventions unsuccessful or patient reports new pain  Outcome: Progressing     Problem: INFECTION - ADULT  Goal: Absence or prevention of progression during hospitalization  Description: INTERVENTIONS:  - Assess and monitor for signs and symptoms of infection  - Monitor lab/diagnostic results  - Monitor all insertion sites, i.e. indwelling lines, tubes, and drains  - Monitor endotracheal if appropriate and nasal secretions for changes in amount and color  - [place] appropriate cooling/warming therapies per order  - Administer medications as ordered  - Instruct and encourage patient and family to use good hand hygiene technique  - Identify and instruct in appropriate isolation  precautions for identified infection/condition  Outcome: Progressing     Problem: SAFETY ADULT  Goal: Patient will remain free of falls  Description: INTERVENTIONS:  - Educate patient/family on patient safety including physical limitations  - Instruct patient to call for assistance with activity   - Consult OT/PT to assist with strengthening/mobility   - Keep Call bell within reach  - Keep bed low and locked with side rails adjusted as appropriate  - Keep care items and personal belongings within reach  - Initiate and maintain comfort rounds  - Make Fall Risk Sign visible to staff  - Offer Toileting every 2 Hours, in advance of need  - Initiate/Maintain bed alarm  - Apply yellow socks and bracelet for high fall risk patients  - Consider moving patient to room near nurses station  Outcome: Progressing  Goal: Maintain or return to baseline ADL function  Description: INTERVENTIONS:  -  Assess patient's ability to carry out ADLs; assess patient's baseline for ADL function and identify physical deficits which impact ability to perform ADLs (bathing, care of mouth/teeth, toileting, grooming, dressing, etc.)  - Assess/evaluate cause of self-care deficits   - Assess range of motion  - Assess patient's mobility; develop plan if impaired  - Assess patient's need for assistive devices and provide as appropriate  - Encourage maximum independence but intervene and supervise when necessary  - Involve family in performance of ADLs  - Assess for home care needs following discharge   - Consider OT consult to assist with ADL evaluation and planning for discharge  - Provide patient education as appropriate  Outcome: Progressing  Goal: Maintains/Returns to pre admission functional level  Description: INTERVENTIONS:  - Perform AM-PAC 6 Click Basic Mobility/ Daily Activity assessment daily.  - Set and communicate daily mobility goal to care team and patient/family/caregiver.   - Collaborate with rehabilitation services on mobility goals  if consulted  - Perform Range of Motion 3 times a day.  - Reposition patient every 2 hours.  - Dangle patient 3 times a day  - Stand patient 4 times a day  - Ambulate patient 3 times a day  - Out of bed to chair 3 times a day   - Out of bed for meals 3 times a day  - Out of bed for toileting  - Record patient progress and toleration of activity level   Outcome: Progressing     Problem: CARDIOVASCULAR - ADULT  Goal: Maintains optimal cardiac output and hemodynamic stability  Description: INTERVENTIONS:  - Monitor I/O, vital signs and rhythm  - Monitor for S/S and trends of decreased cardiac output  - Administer and titrate ordered vasoactive medications to optimize hemodynamic stability  - Assess quality of pulses, skin color and temperature  - Assess for signs of decreased coronary artery perfusion  - Instruct patient to report change in severity of symptoms  Outcome: Progressing     Problem: SKIN/TISSUE INTEGRITY - ADULT  Goal: Incision(s), wounds(s) or drain site(s) healing without S/S of infection  Description: INTERVENTIONS  - Assess and document dressing, incision, wound bed, drain sites and surrounding tissue  - Provide patient and family education  - Perform skin care/dressing changes as per order  Outcome: Progressing

## 2024-09-08 NOTE — ASSESSMENT & PLAN NOTE
Lab Results   Component Value Date    HGBA1C 6.2 (H) 09/07/2024       Recent Labs     09/08/24  1054 09/08/24  1544 09/08/24 2028 09/09/24  0719   POCGLU 151* 134 146* 119       Blood Sugar Average: Last 72 hrs:  (P) 139.8  Acceptable  Resume metformin 500 daily on discharge  Diabetic diet  Follow-up with PCP

## 2024-09-08 NOTE — PROGRESS NOTES
[facility]  Progress Note  Name: Eulalio Raphael I  MRN: [id number]  Unit/Bed#: 2 [address] Date of Admission: 9/6/2024   Date of Service: 9/8/2024 I Hospital Day: 2    Assessment & Plan   * Cellulitis  Assessment & Plan  Presented with increased erythema, warmth, tenderness superimposed on left upper extremity lymphedema status post family dog bite 4 days ago.  Dog is up-to-date with vaccination  Afebrile, still with leukocytosis with mild decline.  Clinically appears to be improving  No evidence of deeper infection on exam  Continue IV Unasyn  Monitor clinically  Left upper extremity elevation as tolerated  Follow-up CBC  Follow-up blood cultures-negative so far    Lactic acidosis-resolved as of 9/7/2024  Assessment & Plan  Lactic acid level 3.6 on presentation, improving to 3 post IV hydration  Likely secondary to metformin use  Patient is afebrile with leukocytosis.  Hemoglobin is stable  IV fluids  Monitor  Hold metformin    Lymphedema of left arm  Assessment & Plan  History of breast cancer status postmastectomy with subsequent left upper extremity lymphedema  Limb elevation/compression as tolerated    Type 2 diabetes mellitus without complication, without long-term current use of insulin (HCC)  Assessment & Plan  Lab Results   Component Value Date    HGBA1C 6.2 (H) 09/07/2024       Recent Labs     09/07/24  1553 09/07/24  2035 09/08/24  0725 09/08/24  1054   POCGLU 144* 129 110 151*       Blood Sugar Average: Last 72 hrs:  (P) [id number]  Acceptable  Hold metformin  Diabetic diet  Monitor blood glucose trend  Insulin sliding scale    Essential hypertension  Assessment & Plan  Elevated on presentation, improving  Continue home amlodipine    Generalized anxiety disorder  Assessment & Plan  Continue paroxetine    Acquired hypothyroidism  Assessment & Plan  Continue levothyroxine          VTE Pharmacologic Prophylaxis: VTE Score: 5 Moderate Risk (Score 3-4) - Pharmacological  DVT Prophylaxis Ordered: enoxaparin (Lovenox).    Mobility:   Basic Mobility Inpatient Raw Score: 19  JH-HLM Goal: 6: Walk 10 steps or more  JH-HLM Achieved: 7: Walk 25 feet or more  JH-HLM Goal achieved. Continue to encourage appropriate mobility.    Patient Centered Rounds: I performed bedside rounds with nursing staff today.   Discussions with Specialists or Other Care Team Provider: Yes    Education and Discussions with Family / Patient: Updated  (daughter) at bedside.  Yesterday    Total Time Spent on Date of Encounter in care of patient: 35 mins. This time was spent on one or more of the following: performing physical exam; counseling and coordination of care; obtaining or reviewing history; documenting in the medical record; reviewing/ordering tests, medications or procedures; communicating with other healthcare professionals and discussing with patient's family/caregivers.    Current Length of Stay: 2 day(s)  Current Patient Status: Inpatient   Certification Statement: The patient will continue to require additional inpatient hospital stay due to IV antibiotics  Discharge Plan: Anticipate discharge in 48 hrs to home.    Code Status: Level 1 - Full Code    Subjective:   Reports improvement in left arm pain  Swelling is better with compression bandage  Still feels mild discomfort and heaviness of left arm    Remains afebrile, still with leukocytosis    Objective:     Vitals:   Temp (24hrs), Av °F (37.2 °C), Min:98.3 °F (36.8 °C), Max:99.6 °F (37.6 °C)    Temp:  [98.3 °F (36.8 °C)-99.6 °F (37.6 °C)] 98.3 °F (36.8 °C)  HR:  [59-65] 59  BP: (125-178)/(51-74) 178/74  SpO2:  [95 %-98 %] 98 %  Body mass index is 26.37 kg/m².     Input and Output Summary (last 24 hours):     Intake/Output Summary (Last 24 hours) at 2024 0906  Last data filed at 2024 1801  Gross per 24 hour   Intake 240 ml   Output --   Net 240 ml       Physical Exam:   Physical Exam  Constitutional:       General: She is not  in acute distress.  HENT:      Head: Normocephalic and atraumatic.   Cardiovascular:      Rate and Rhythm: Normal rate.   Pulmonary:      Effort: Pulmonary effort is normal. No respiratory distress.      Breath sounds: Decreased breath sounds present. No wheezing or rales.   Abdominal:      General: Bowel sounds are normal. There is no distension.      Palpations: Abdomen is soft.      Tenderness: There is no abdominal tenderness. There is no guarding or rebound.   Musculoskeletal:         General: Swelling present.      Comments: Improved erythema, still with increased warmth tenderness over left upper extremity especially medial part of the arm, no fluctuation or painful range of motion at joints    Skin:     General: Skin is warm and dry.      Findings: No rash.   Neurological:      Mental Status: She is alert. Mental status is at baseline.      Cranial Nerves: No cranial nerve deficit.          Additional Data:     Labs:  Results from last 7 days   Lab Units 09/08/24  0516   WBC Thousand/uL 16.02*   HEMOGLOBIN g/dL 12.9   HEMATOCRIT % 40.6   PLATELETS Thousands/uL 199     Results from last 7 days   Lab Units 09/08/24  0516 09/07/24  0522 09/06/24  1508   SODIUM mmol/L 140   < > 132*   POTASSIUM mmol/L 3.6   < > 3.8   CHLORIDE mmol/L 106   < > 100   CO2 mmol/L 29   < > 19*   BUN mg/dL 10   < > 13   CREATININE mg/dL 0.71   < > 0.77   ANION GAP mmol/L 5   < > 13   CALCIUM mg/dL 9.4   < > 9.4   ALBUMIN g/dL  --   --  3.5   TOTAL BILIRUBIN mg/dL  --   --  0.59   ALK PHOS U/L  --   --  102   ALT U/L  --   --  10   AST U/L  --   --  16   GLUCOSE RANDOM mg/dL 94   < > 229*    < > = values in this interval not displayed.     Results from last 7 days   Lab Units 09/06/24  1508   INR  0.99     Results from last 7 days   Lab Units 09/08/24  0725 09/07/24  2035 09/07/24  1553 09/07/24  1052 09/07/24  0712 09/06/24  2101   POC GLUCOSE mg/dl 110 129 144* 136 127 202*     Results from last 7 days   Lab Units 09/07/24  0522    HEMOGLOBIN A1C % 6.2*     Results from last 7 days   Lab Units 09/07/24  0522 09/06/24  1711 09/06/24  1508   LACTIC ACID mmol/L 1.7 3.0* 3.6*   PROCALCITONIN ng/ml 3.39*  --  1.47*       Lines/Drains:  Invasive Devices       Peripheral Intravenous Line  Duration             Peripheral IV 09/06/24 Distal;Right;Upper;Ventral (anterior) Arm 1 day                          Imaging: No pertinent imaging reviewed.    Recent Cultures (last 7 days):   Results from last 7 days   Lab Units 09/06/24  1538 09/06/24  1508   BLOOD CULTURE  No Growth at 24 hrs. No Growth at 24 hrs.       Last 24 Hours Medication List:   Current Facility-Administered Medications   Medication Dose Route Frequency Provider Last Rate    acetaminophen  650 mg Oral Q6H PRN Riaz Tatum MD      amLODIPine  10 mg Oral Daily Riaz Tatum MD      ampicillin-sulbactam  3 g Intravenous Q6H Riaz Tatum MD 3 g (09/08/24 0508)    enoxaparin  40 mg Subcutaneous Daily Riaz Tatum MD      insulin lispro  1-5 Units Subcutaneous TID AC Riaz Tatum MD      insulin lispro  1-5 Units Subcutaneous HS Riaz Tatum MD      ketorolac  15 mg Intravenous Q6H PRN Riaz Tatum MD      levothyroxine  25 mcg Oral Daily Riaz Tatum MD      multivitamin stress formula  1 tablet Oral Daily Riaz Tatum MD      PARoxetine  20 mg Oral Daily Riaz Tatum MD          Today, Patient Was Seen By: Riaz Tatum MD    **Please Note: This note may have been constructed using a voice recognition system.**

## 2024-09-08 NOTE — UTILIZATION REVIEW
Initial Clinical Review    Admission: Date/Time/Statement:   Admission Orders (From admission, onward)       Ordered        09/06/24 1623  INPATIENT ADMISSION  Once                          Orders Placed This Encounter   Procedures    INPATIENT ADMISSION     Standing Status:   Standing     Number of Occurrences:   1     Order Specific Question:   Level of Care     Answer:   Med Surg [16]     Order Specific Question:   Estimated length of stay     Answer:   More than 2 Midnights     Order Specific Question:   Certification     Answer:   I certify that inpatient services are medically necessary for this patient for a duration of greater than two midnights. See H&P and MD Progress Notes for additional information about the patient's course of treatment.     ED Arrival Information       Expected   -    Arrival   9/6/2024 12:51    Acuity   Urgent              Means of arrival   Wheelchair    Escorted by   Family Member    Service   Hospitalist    Admission type   Emergency              Arrival complaint   swelling left hand/fever/weakness             Chief Complaint   Patient presents with    Cellulitis     Hx of lymphedema from breast surgery, family dog bit this arm four days. Ago now more painful red and warm and feeling under the weather       Initial Presentation:   78 yof to ER from home. Pt reports being bitten by her dog 4 days ago and of the left dorsal wrist. She reports redness and warmth to this area today. No fever or chills. Dog is up-to-date on rabies immunizations. Hx hypertension, diabetes mellitus type 2, post thyroidectomy hypothyroidism, breast CA status post mastectomy, chronic left upper extremity lymphedema; unsure last tetanus shot. Presents febrile & bradycardic with moderate swelling of the left upper extremity (patient reports baseline for her from lymphedema).  2 puncture wounds to the dorsal left wrist.  There is erythema and warm of the distal left upper extremity.  Sensation intact.  Pulses  intact.  Cap refill less than 2 seconds in all fingers left hand. Admission labs: WBC 16.15, Na 132, CO2 19, gluc 229, , procalcitonin 1.47, lactic acid 3.6.  Admitted to inpatient status for cellulitis LUE s/p dog bite. Started on IVABT, cultures pending.             Anticipated Length of Stay/Certification Statement:   Patient will be admitted on an Inpatient basis with an anticipated length of stay of  > 2 midnights.   Justification for Hospital Stay: Cellulitis status post dog bite superimposed on left upper extremity lymphedema requiring IV antibiotics     Date: 9/7/24   Day 2:   LUE cellulitis. IVABT in progress, final cultures pending. Persistent fevers,  leukocytosis.Improved erythema, still with increased warmth tenderness over left upper extremity extending up arm, no fluctuation or painful range of motion at joints. Using IV Toradol for pain control.     Date: 9/8/24  Day 3: Has surpassed a 2nd midnight with active treatments and services.  LUE cellulitis s/p dog bite. IVABT ongoing. Pain, swelling & redness improving slowly. Continue serial LUE exams, pain mgt, monitor VS, follow labs/cultures.     ED Triage Vitals [09/06/24 1332]   Temperature Pulse Respirations Blood Pressure SpO2 Pain Score   99.5 °F (37.5 °C) (!) 54 20 157/70 98 % 7     Weight (last 2 days)       Date/Time Weight    09/06/24 2001 65.4 (144.2)            Vital Signs (last 3 days)       Date/Time Temp Pulse Resp BP MAP (mmHg) SpO2 O2 Device Patient Position - Orthostatic VS Pain    09/08/24 08:19:23 -- 59 -- 178/74 109 98 % -- -- --    09/07/24 23:38:52 98.3 °F (36.8 °C) 65 -- 125/54 78 95 % -- -- --    09/07/24 2200 -- -- -- -- -- -- None (Room air) -- No Pain    09/07/24 1949 -- -- -- -- -- -- -- -- 7    09/07/24 19:32:01 99.1 °F (37.3 °C) 64 -- 131/51 78 96 % -- -- --    09/07/24 15:04:48 99.6 °F (37.6 °C) -- -- 152/57 89 -- -- -- --    09/07/24 08:00:43 97.8 °F (36.6 °C) 58 18 150/58 89 98 % None (Room air) -- No Pain    09/07/24  08:00:09 97.8 °F (36.6 °C) 52 -- 150/58 89 98 % -- -- --    09/07/24 06:52:44 98.5 °F (36.9 °C) 51 16 151/61 91 97 % -- -- --    09/07/24 0245 -- -- -- 136/56 -- -- -- -- --    09/07/24 02:16:31 99.2 °F (37.3 °C) 63 18 139/48 78 98 % -- -- --    09/06/24 22:45:30 100.1 °F (37.8 °C) -- -- 145/45 78 -- -- -- --    09/06/24 2151 -- -- -- -- -- -- -- -- 5    09/06/24 21:46:41 99 °F (37.2 °C) 61 18 168/57 94 96 % -- -- --    09/06/24 2001 -- -- -- -- -- -- -- -- No Pain    09/06/24 19:40:36 98.3 °F (36.8 °C) 64 18 177/57 97 96 % -- -- --    09/06/24 1808 -- -- -- -- -- -- -- -- No Pain    09/06/24 17:49:48 99.3 °F (37.4 °C) 55 18 156/58 91 97 % None (Room air) Lying --    09/06/24 1649 97.6 °F (36.4 °C) 58 20 185/78 -- 99 % None (Room air) Lying --    09/06/24 1500 -- -- -- -- -- -- None (Room air) -- --    09/06/24 1332 99.5 °F (37.5 °C) 54 20 157/70 -- 98 % None (Room air) Sitting 7              Pertinent Labs/Diagnostic Test Results:     Results from last 7 days   Lab Units 09/08/24  0516 09/07/24  0522 09/06/24  1508   WBC Thousand/uL 16.02* 17.27* 16.15*   HEMOGLOBIN g/dL 12.9 12.3 13.4   HEMATOCRIT % 40.6 39.2 42.8   PLATELETS Thousands/uL 199 200 223         Results from last 7 days   Lab Units 09/08/24  0516 09/07/24  0522 09/06/24  1508   SODIUM mmol/L 140 139 132*   POTASSIUM mmol/L 3.6 3.6 3.8   CHLORIDE mmol/L 106 105 100   CO2 mmol/L 29 27 19*   ANION GAP mmol/L 5 7 13   BUN mg/dL 10 12 13   CREATININE mg/dL 0.71 0.79 0.77   EGFR ml/min/1.73sq m 82 72 74   CALCIUM mg/dL 9.4 9.3 9.4     Results from last 7 days   Lab Units 09/06/24  1508   AST U/L 16   ALT U/L 10   ALK PHOS U/L 102   TOTAL PROTEIN g/dL 6.8   ALBUMIN g/dL 3.5   TOTAL BILIRUBIN mg/dL 0.59     Results from last 7 days   Lab Units 09/08/24  0725 09/07/24  2035 09/07/24  1553 09/07/24  1052 09/07/24  0712 09/06/24  2101   POC GLUCOSE mg/dl 110 129 144* 136 127 202*     Results from last 7 days   Lab Units 09/08/24  0516 09/07/24  0522  09/06/24  1508   GLUCOSE RANDOM mg/dL 94 109 229*         Results from last 7 days   Lab Units 09/07/24  0522   HEMOGLOBIN A1C % 6.2*   EAG mg/dl 131       Results from last 7 days   Lab Units 09/06/24  1508   PROTIME seconds 13.6   INR  0.99   PTT seconds 24         Results from last 7 days   Lab Units 09/07/24  0522 09/06/24  1508   PROCALCITONIN ng/ml 3.39* 1.47*     Results from last 7 days   Lab Units 09/07/24  0522 09/06/24  1711 09/06/24  1508   LACTIC ACID mmol/L 1.7 3.0* 3.6*       Results from last 7 days   Lab Units 09/06/24  1538 09/06/24  1508   BLOOD CULTURE  No Growth at 24 hrs. No Growth at 24 hrs.                   ED Treatment-Medication Administration from 09/06/2024 1250 to 09/06/2024 1735         Date/Time Order Dose Route Action     09/06/2024 1539 ampicillin-sulbactam (UNASYN) 3 g in sodium chloride 0.9 % 100 mL IVPB 3 g Intravenous New Bag     09/06/2024 1641 sodium chloride 0.9 % infusion 125 mL/hr Intravenous New Bag            Past Medical History:   Diagnosis Date    Acute encephalopathy     Breast cancer (HCC)     Diabetes mellitus (HCC)     Hypertension      Present on Admission:   Acquired hypothyroidism   Cellulitis   Essential hypertension   Generalized anxiety disorder   Lymphedema of left arm   Type 2 diabetes mellitus without complication, without long-term current use of insulin (HCC)   (Resolved) Lactic acidosis      Admitting Diagnosis: Cellulitis [L03.90]  Pain [R52]  Lymphedema of left arm [I89.0]  Age/Sex: 78 y.o. female  Admission Orders:  Scd/foot pumps  Accuchecks  Elevate KATARZYNA    Scheduled Medications:  Medications 08/30 08/31 09/01 09/02 09/03 09/04 09/05 09/06 09/07 09/08   amLODIPine (NORVASC) tablet 10 mg  Dose: 10 mg  Freq: Daily Route: PO  Start: 09/07/24 0900   Admin Instructions:      Order specific questions:               0851      7714        ampicillin-sulbactam (UNASYN) 3 g in sodium chloride 0.9 % 100 mL IVPB  Dose: 3 g  Freq: Every 6 hours Route: IV  Last  Dose: 3 g (09/08/24 0508)  Start: 09/06/24 1800   Admin Instructions:      Order specific questions:              1840      0004     0557     1157     1700     2344      0508     1200     1800        ampicillin-sulbactam (UNASYN) 3 g in sodium chloride 0.9 % 100 mL IVPB  Dose: 3 g  Freq: Once Route: IV  Last Dose: Stopped (09/06/24 1609)  Start: 09/06/24 1515 End: 09/06/24 1609   Admin Instructions:      Order specific questions:              1536     1609          enoxaparin (LOVENOX) subcutaneous injection 40 mg  Dose: 40 mg  Freq: Daily Route: SC  Start: 09/07/24 0900   Admin Instructions:               0851      0819        insulin lispro (HumALOG/ADMELOG) 100 units/mL subcutaneous injection 1-5 Units  Dose: 1-5 Units  Freq: Daily at bedtime Route: SC  Start: 09/06/24 2200   Admin Instructions:              2120      (5464) [C]      2200         insulin lispro (HumALOG/ADMELOG) 100 units/mL subcutaneous injection 1-5 Units  Dose: 1-5 Units  Freq: 3 times daily before meals Route: SC  Start: 09/07/24 0700   Admin Instructions:               (7033) (9209) (4649) (5828) [id number]        levothyroxine tablet 25 mcg  Dose: 25 mcg  Freq: Daily Route: PO  Start: 09/07/24 0900   Admin Instructions:               0851      0815        multivitamin stress formula tablet 1 tablet  Dose: 1 tablet  Freq: Daily Route: PO  Start: 09/07/24 0900   Admin Instructions:               0851      0815        PARoxetine (PAXIL) tablet 20 mg  Dose: 20 mg  Freq: Daily Route: PO  Start: 09/07/24 0900   Admin Instructions:               0851      0816        Legend:       [id number]/0109/0209/0309/0409/0509/0609/0709/08        Continuous Meds Sorted by Name  for Eulalio Raphael as of 08/30/24 through 9/8/24  Legend:       Medications 08/30 08/31 09/01 09/02 09/03 09/04 09/05 09/06 09/07 09/08   sodium chloride 0.9 % infusion  Rate: 75 mL/hr Dose: 75 mL/hr  Freq: Continuous Route: IV  Indications of Use: IV  Hydration  Last Dose: Stopped (09/07/24 0859)  Start: 09/06/24 1900 End: 09/07/24 0752           1944      0752-D/C'd  0859         sodium chloride 0.9 % infusion  Rate: 125 mL/hr Dose: 125 mL/hr  Freq: Continuous Route: IV  Last Dose: Stopped (09/07/24 0700)  Start: 09/06/24 1645 End: 09/06/24 1859 1641     1859-D/C'd    0700                     PRN Meds Sorted by Name  for Eulalio Raphael as of 08/30/24 through 9/8/24  Legend:       Medications 08/30 08/31 09/01 09/02 09/03 09/04 09/05 09/06 09/07 09/08   acetaminophen (TYLENOL) tablet 650 mg  Dose: 650 mg  Freq: Every 6 hours PRN Route: PO  PRN Reasons: mild pain,headaches,fever  Indications of Use: FEVER,HEADACHE,MILD PAIN  Start: 09/06/24 1748           2156      1700         ketorolac (TORADOL) injection 15 mg  Dose: 15 mg  Freq: Every 6 hours PRN Route: IV  PRN Reasons: severe pain,moderate pain  Start: 09/06/24 1918 End: 09/08/24 1917   Admin Instructions:               1949      1917-D/C'd                    Network Utilization Review Department  ATTENTION: Please call with any questions or concerns to [phone] and carefully listen to the prompts so that you are directed to the right person. All voicemails are confidential.   For Discharge needs, contact Care Management DC Support Team at [phone] opt. 2  Send all requests for admission clinical reviews, approved or denied determinations and any other requests to dedicated fax number below belonging to the campus where the patient is receiving treatment. List of dedicated fax numbers for the Facilities:  FACILITY NAME UR FAX NUMBER   ADMISSION DENIALS (Administrative/Medical Necessity) [phone]   DISCHARGE SUPPORT TEAM (NETWORK) [phone]   PARENT CHILD HEALTH (Maternity/NICU/Pediatrics) [phone]   [facility] [phone]   [facility] [phone]   [facility] [phone]   [facility] [phone]   [facility] [phone]   [facility] [phone]   [facility] [phone]   [facility] [phone]   [facility] [phone]   [facility] [phone]   [facility] [phone]   [facility] [phone]

## 2024-09-08 NOTE — PLAN OF CARE
Problem: Potential for Falls  Goal: Patient will remain free of falls  Description: INTERVENTIONS:  - Educate patient/family on patient safety including physical limitations  - Instruct patient to call for assistance with activity   - Consult OT/PT to assist with strengthening/mobility   - Keep Call bell within reach  - Keep bed low and locked with side rails adjusted as appropriate  - Keep care items and personal belongings within reach  - Initiate and maintain comfort rounds  - Make Fall Risk Sign visible to staff  - Offer Toileting every 2 Hours, in advance of need  - Initiate/Maintain bed alarm  - Apply yellow socks and bracelet for high fall risk patients  - Consider moving patient to room near nurses station  Outcome: Progressing     Problem: PAIN - ADULT  Goal: Verbalizes/displays adequate comfort level or baseline comfort level  Description: Interventions:  - Encourage patient to monitor pain and request assistance  - Assess pain using appropriate pain scale  - Administer analgesics based on type and severity of pain and evaluate response  - Implement non-pharmacological measures as appropriate and evaluate response  - Consider cultural and social influences on pain and pain management  - Notify physician/advanced practitioner if interventions unsuccessful or patient reports new pain  Outcome: Progressing     Problem: INFECTION - ADULT  Goal: Absence or prevention of progression during hospitalization  Description: INTERVENTIONS:  - Assess and monitor for signs and symptoms of infection  - Monitor lab/diagnostic results  - Monitor all insertion sites, i.e. indwelling lines, tubes, and drains  - Monitor endotracheal if appropriate and nasal secretions for changes in amount and color  - [place] appropriate cooling/warming therapies per order  - Administer medications as ordered  - Instruct and encourage patient and family to use good hand hygiene technique  - Identify and instruct in appropriate isolation  precautions for identified infection/condition  Outcome: Progressing     Problem: SAFETY ADULT  Goal: Patient will remain free of falls  Description: INTERVENTIONS:  - Educate patient/family on patient safety including physical limitations  - Instruct patient to call for assistance with activity   - Consult OT/PT to assist with strengthening/mobility   - Keep Call bell within reach  - Keep bed low and locked with side rails adjusted as appropriate  - Keep care items and personal belongings within reach  - Initiate and maintain comfort rounds  - Make Fall Risk Sign visible to staff  - Offer Toileting every 2 Hours, in advance of need  - Initiate/Maintain bed alarm  - Apply yellow socks and bracelet for high fall risk patients  - Consider moving patient to room near nurses station  Outcome: Progressing  Goal: Maintain or return to baseline ADL function  Description: INTERVENTIONS:  -  Assess patient's ability to carry out ADLs; assess patient's baseline for ADL function and identify physical deficits which impact ability to perform ADLs (bathing, care of mouth/teeth, toileting, grooming, dressing, etc.)  - Assess/evaluate cause of self-care deficits   - Assess range of motion  - Assess patient's mobility; develop plan if impaired  - Assess patient's need for assistive devices and provide as appropriate  - Encourage maximum independence but intervene and supervise when necessary  - Involve family in performance of ADLs  - Assess for home care needs following discharge   - Consider OT consult to assist with ADL evaluation and planning for discharge  - Provide patient education as appropriate  Outcome: Progressing  Goal: Maintains/Returns to pre admission functional level  Description: INTERVENTIONS:  - Perform AM-PAC 6 Click Basic Mobility/ Daily Activity assessment daily.  - Set and communicate daily mobility goal to care team and patient/family/caregiver.   - Collaborate with rehabilitation services on mobility goals  if consulted  - Perform Range of Motion 3 times a day.  - Reposition patient every 2 hours.  - Dangle patient 3 times a day  - Stand patient 4 times a day  - Ambulate patient 3 times a day  - Out of bed to chair 3 times a day   - Out of bed for meals 3 times a day  - Out of bed for toileting  - Record patient progress and toleration of activity level   Outcome: Progressing     Problem: CARDIOVASCULAR - ADULT  Goal: Maintains optimal cardiac output and hemodynamic stability  Description: INTERVENTIONS:  - Monitor I/O, vital signs and rhythm  - Monitor for S/S and trends of decreased cardiac output  - Administer and titrate ordered vasoactive medications to optimize hemodynamic stability  - Assess quality of pulses, skin color and temperature  - Assess for signs of decreased coronary artery perfusion  - Instruct patient to report change in severity of symptoms  Outcome: Progressing     Problem: SKIN/TISSUE INTEGRITY - ADULT  Goal: Incision(s), wounds(s) or drain site(s) healing without S/S of infection  Description: INTERVENTIONS  - Assess and document dressing, incision, wound bed, drain sites and surrounding tissue  - Provide patient and family education  - Perform skin care/dressing changes as per order  Outcome: Progressing

## 2024-09-09 ENCOUNTER — TRANSITIONAL CARE MANAGEMENT (OUTPATIENT)
Dept: FAMILY MEDICINE CLINIC | Facility: CLINIC | Age: 79
End: 2024-09-09

## 2024-09-09 ENCOUNTER — TELEPHONE (OUTPATIENT)
Dept: FAMILY MEDICINE CLINIC | Facility: CLINIC | Age: 79
End: 2024-09-09

## 2024-09-09 VITALS
SYSTOLIC BLOOD PRESSURE: 152 MMHG | RESPIRATION RATE: 18 BRPM | WEIGHT: 144.2 LBS | HEIGHT: 62 IN | HEART RATE: 58 BPM | BODY MASS INDEX: 26.54 KG/M2 | TEMPERATURE: 98.1 F | DIASTOLIC BLOOD PRESSURE: 70 MMHG | OXYGEN SATURATION: 98 %

## 2024-09-09 LAB
ANION GAP SERPL CALCULATED.3IONS-SCNC: 4 MMOL/L (ref 4–13)
BUN SERPL-MCNC: 9 MG/DL (ref 5–25)
CALCIUM SERPL-MCNC: 9.1 MG/DL (ref 8.4–10.2)
CHLORIDE SERPL-SCNC: 109 MMOL/L (ref 96–108)
CO2 SERPL-SCNC: 27 MMOL/L (ref 21–32)
CREAT SERPL-MCNC: 0.65 MG/DL (ref 0.6–1.3)
ERYTHROCYTE [DISTWIDTH] IN BLOOD BY AUTOMATED COUNT: 15.7 % (ref 11.6–15.1)
GFR SERPL CREATININE-BSD FRML MDRD: 85 ML/MIN/1.73SQ M
GLUCOSE SERPL-MCNC: 107 MG/DL (ref 65–140)
GLUCOSE SERPL-MCNC: 119 MG/DL (ref 65–140)
GLUCOSE SERPL-MCNC: 159 MG/DL (ref 65–140)
HCT VFR BLD AUTO: 38.8 % (ref 34.8–46.1)
HGB BLD-MCNC: 12.3 G/DL (ref 11.5–15.4)
MCH RBC QN AUTO: 25.6 PG (ref 26.8–34.3)
MCHC RBC AUTO-ENTMCNC: 31.7 G/DL (ref 31.4–37.4)
MCV RBC AUTO: 81 FL (ref 82–98)
PLATELET # BLD AUTO: 213 THOUSANDS/UL (ref 149–390)
PMV BLD AUTO: 9.9 FL (ref 8.9–12.7)
POTASSIUM SERPL-SCNC: 3.7 MMOL/L (ref 3.5–5.3)
PROCALCITONIN SERPL-MCNC: 1.36 NG/ML
RBC # BLD AUTO: 4.81 MILLION/UL (ref 3.81–5.12)
SODIUM SERPL-SCNC: 140 MMOL/L (ref 135–147)
WBC # BLD AUTO: 9.08 THOUSAND/UL (ref 4.31–10.16)

## 2024-09-09 PROCEDURE — 84145 PROCALCITONIN (PCT): CPT | Performed by: INTERNAL MEDICINE

## 2024-09-09 PROCEDURE — 99239 HOSP IP/OBS DSCHRG MGMT >30: CPT | Performed by: INTERNAL MEDICINE

## 2024-09-09 PROCEDURE — 85027 COMPLETE CBC AUTOMATED: CPT | Performed by: INTERNAL MEDICINE

## 2024-09-09 PROCEDURE — 80048 BASIC METABOLIC PNL TOTAL CA: CPT | Performed by: INTERNAL MEDICINE

## 2024-09-09 PROCEDURE — 82948 REAGENT STRIP/BLOOD GLUCOSE: CPT

## 2024-09-09 RX ADMIN — AMLODIPINE BESYLATE 10 MG: 10 TABLET ORAL at 09:14

## 2024-09-09 RX ADMIN — AMPICILLIN SODIUM AND SULBACTAM SODIUM 3 G: 2; 1 INJECTION, POWDER, FOR SOLUTION INTRAMUSCULAR; INTRAVENOUS at 06:13

## 2024-09-09 RX ADMIN — PAROXETINE 20 MG: 20 TABLET, FILM COATED ORAL at 09:14

## 2024-09-09 RX ADMIN — B-COMPLEX W/ C & FOLIC ACID TAB 1 TABLET: TAB at 09:14

## 2024-09-09 RX ADMIN — LEVOTHYROXINE SODIUM 25 MCG: 25 TABLET ORAL at 06:14

## 2024-09-09 NOTE — DISCHARGE INSTR - AVS FIRST PAGE
Continue antibiotics as prescribed  Left upper extremity elevation/compression as tolerated  
TAB Hirsch RN

## 2024-09-09 NOTE — TELEPHONE ENCOUNTER
I tried to reach Eulalio on 9/9/24 to schedule her TCM. Her voice mailbox was full and not accepting messages. I will continue to make an outreach Annita

## 2024-09-09 NOTE — PLAN OF CARE
Problem: Potential for Falls  Goal: Patient will remain free of falls  Description: INTERVENTIONS:  - Educate patient/family on patient safety including physical limitations  - Instruct patient to call for assistance with activity   - Consult OT/PT to assist with strengthening/mobility   - Keep Call bell within reach  - Keep bed low and locked with side rails adjusted as appropriate  - Keep care items and personal belongings within reach  - Initiate and maintain comfort rounds  - Make Fall Risk Sign visible to staff  - Offer Toileting every 2 Hours, in advance of need  - Initiate/Maintain bed alarm  - Apply yellow socks and bracelet for high fall risk patients  - Consider moving patient to room near nurses station  Outcome: Progressing     Problem: PAIN - ADULT  Goal: Verbalizes/displays adequate comfort level or baseline comfort level  Description: Interventions:  - Encourage patient to monitor pain and request assistance  - Assess pain using appropriate pain scale  - Administer analgesics based on type and severity of pain and evaluate response  - Implement non-pharmacological measures as appropriate and evaluate response  - Consider cultural and social influences on pain and pain management  - Notify physician/advanced practitioner if interventions unsuccessful or patient reports new pain  Outcome: Progressing     Problem: INFECTION - ADULT  Goal: Absence or prevention of progression during hospitalization  Description: INTERVENTIONS:  - Assess and monitor for signs and symptoms of infection  - Monitor lab/diagnostic results  - Monitor all insertion sites, i.e. indwelling lines, tubes, and drains  - Administer medications as ordered  - Instruct and encourage patient and family to use good hand hygiene technique  Outcome: Progressing     Problem: SKIN/TISSUE INTEGRITY - ADULT  Goal: Incision(s), wounds(s) or drain site(s) healing without S/S of infection  Description: INTERVENTIONS  - Assess and document  dressing, incision, wound bed, drain sites and surrounding tissue  - Provide patient and family education  - Perform skin care/dressing changes as per order  Outcome: Progressing

## 2024-09-09 NOTE — NURSING NOTE
Patient discharged home. AVS reviewed with patient including follow up appointments and antibiotic to be taken outpatient. IV removed. All belongings sent with the patient.

## 2024-09-09 NOTE — DISCHARGE SUMMARY
Discharge Summary - [facility]    Patient Information: Eulalio Raphael 78 y.o. female MRN: [id number]  Unit/Bed#: [address] [place] [address] Encounter: [id number]    Discharging Physician / Practitioner: Riaz Tatum MD  PCP: Lovely Kim DO  Admission Date: 9/6/2024  Discharge Date: 09/09/24    Reason for Admission: Cellulitis (Hx of lymphedema from breast surgery, family dog bit this arm four days. Ago now more painful red and warm and feeling under the weather)      Discharge Diagnoses:     Principal Problem:    Cellulitis  Active Problems:    Essential hypertension    Type 2 diabetes mellitus without complication, without long-term current use of insulin (HCC)    Lymphedema of left arm    Acquired hypothyroidism    Generalized anxiety disorder  Resolved Problems:    Lactic acidosis        * Cellulitis  Assessment & Plan  Presented with increased erythema, warmth, tenderness superimposed on left upper extremity lymphedema status post family dog bite 4 days ago.  Dog is up-to-date with vaccination  Afebrile, leukocytosis improved.  Blood cultures are negative.  Left upper extremity symptoms now improved to baseline.  Erythema resolved, mild induration.  No evidence of deeper infection on exam  Treated with IV Unasyn, transition to Augmentin to complete 7 days  Monitor clinically  Limb elevation/compression as tolerated  Follow-up with PCP    Lactic acidosis-resolved as of 9/7/2024  Assessment & Plan  Lactic acid level 3.6 on presentation, improving to 3 post IV hydration  Likely secondary to metformin use  Patient is afebrile with leukocytosis.  Hemoglobin is stable  IV fluids  Monitor  Hold metformin    Lymphedema of left arm  Assessment & Plan  History of breast cancer status postmastectomy with subsequent left upper extremity lymphedema  Limb elevation/compression as tolerated    Type 2 diabetes mellitus without complication, without long-term current use of insulin (HCC)  Assessment & Plan  Lab Results    Component Value Date    HGBA1C 6.2 (H) 09/07/2024       Recent Labs     09/08/24  1054 09/08/24  1544 09/08/24 2028 09/09/24  0719   POCGLU 151* 134 146* 119       Blood Sugar Average: Last 72 hrs:  (P) 139.8  Acceptable  Resume metformin 500 daily on discharge  Diabetic diet  Follow-up with PCP    Essential hypertension  Assessment & Plan  Elevated on presentation, improving  Continue home amlodipine  Follow-up with PCP    Generalized anxiety disorder  Assessment & Plan  Continue paroxetine    Acquired hypothyroidism  Assessment & Plan  Continue levothyroxine        Consultations During Hospital Stay:  None    Procedures Performed:     None    Significant Findings:     Refer to hospital course and above listed diagnosis related plan for details    Imaging while in hospital:    No results found.    Incidental Findings:   None    Test Results Pending at Discharge (will require follow up):   As per After Visit Summary     Outpatient Tests Requested:  None    Complications:  Refer to hospital course and above listed diagnosis related plan, if any    Hospital Course:   As per HPI  Eulalio Raphael is a 78 y.o. female patient with history of hypertension, diabetes mellitus type 2, hypothyroidism, breast CVA s/p mastectomy, chronic left upper extremity lymphedema who originally presented to the hospital on 9/6/2024 due to left upper extremity cellulitis, progressively developing after bitten by family dog 4 days prior to presentation.  Patient reported associated with subjective fever and malaise.  Patient was evaluated in ED, noted to have elevated leukocytosis, procalcitonin and lactic acid.  Patient was started on IV Unasyn and was subsequently admitted.  Patient reported that dog was up-to-date with the vaccination.  Leukocytosis resolved promptly with the treatment and hydration.  Patient was continued IV Unasyn, remained afebrile, left upper extremity cellulitis gradually improved erythema resolved.  No evidence of  "deeper infection noted.  Leukocytosis normalized, procalcitonin is downtrending.  Blood cultures are negative.  Will transition to Augmentin to complete 7 days.  Patient was advised monitoring of symptoms and limb elevation/compression as tolerated.  Patient was advised to follow-up with PCP after discharge        Please see above list of diagnoses and related plan for additional information.       Condition at Discharge: stable     Discharge Day Visit / Exam:     Subjective:    Feels better  Left upper extremity discomfort is at baseline  Erythema resolved  Denies any fever or chills  Reports frequent urination but denies dysuria  Tolerating diet well  Ambulating in room without any difficulties    Vitals: Blood Pressure: 154/64 (09/08/24 2258)  Pulse: (!) 51 (09/08/24 2258)  Temperature: 98.3 °F (36.8 °C) (09/08/24 2258)  Temp Source: Oral (09/08/24 2258)  Respirations: 18 (09/08/24 2258)  Height: 5' 2\" (157.5 cm) (09/06/24 2001)  Weight - Scale: 65.4 kg (144 lb 3.2 oz) (09/06/24 2001)  SpO2: 96 % (09/08/24 2258)  Exam:   Physical Exam  Constitutional:       General: She is not in acute distress.  HENT:      Head: Normocephalic and atraumatic.      Nose: Nose normal.   Eyes:      Conjunctiva/sclera: Conjunctivae normal.      Pupils: Pupils are equal, round, and reactive to light.   Cardiovascular:      Rate and Rhythm: Normal rate and regular rhythm.      Heart sounds: Normal heart sounds.   Pulmonary:      Effort: Pulmonary effort is normal. No respiratory distress.      Breath sounds: Normal breath sounds. No wheezing or rales.   Abdominal:      General: Bowel sounds are normal. There is no distension.      Palpations: Abdomen is soft.      Tenderness: There is no abdominal tenderness. There is no guarding or rebound.   Musculoskeletal:      Comments: Left upper extremity erythema resolved.  Nontender.  Mild chronic in duration from lymphedema.  Small bite marks near dorsum of the hand without any fluctuation or " "surrounding erythema.  Normal range of motion at joints   Skin:     General: Skin is warm and dry.      Findings: No rash.   Neurological:      Mental Status: She is alert.      Cranial Nerves: No cranial nerve deficit.         Discharge instructions/Information to patient and family:(Discharge Medications and Follow up):   See after visit summary for information provided to patient and family.      Provisions for Follow-Up Care:  See after visit summary for information related to follow-up care and any pertinent home health orders.      Disposition: Home    Planned Readmission:  No     Discharge Statement:  I spent 45 minutes discharging the patient. This time was spent on the day of discharge. I had direct contact with the patient on the day of discharge. Greater than 50% of the total time was spent examining patient, answering all patient questions, arranging and discussing plan of care with patient as well as directly providing post-discharge instructions.  Additional time then spent on discharge activities.  Discussed with daughter at bedside    Discharge Medications:  See after visit summary for reconciled discharge medications provided to patient and family.      ** Please Note: \"This note has been constructed using a voice recognition system.Therefore there may be syntax, spelling, and/or grammatical errors. Please call if you have any questions. \"**        "

## 2024-09-09 NOTE — CASE MANAGEMENT
Case Management Assessment & Discharge Planning Note    Patient name Eulalio Raphael  Location [address] [facility] MRN [id number]  : 1945 Date 2024       Current Admission Date: 2024  Current Admission Diagnosis:Cellulitis   Patient Active Problem List    Diagnosis Date Noted Date Diagnosed    Cellulitis 2024     Dog bite of left arm 2024     Closed nondisplaced fracture of left clavicle 2024     Closed left humeral fracture 2024     Fall 2024     Nasal bone fracture 2024     Thyroid mass 2023     Papillary thyroid carcinoma (HCC) 2023     Substernal goiter 2023     History of breast cancer 2023     Recurrent depression (HCC) 2023     Essential hypertension 2023     Type 2 diabetes mellitus without complication, without long-term current use of insulin (HCC) 2023     Acquired hypothyroidism 2023     Generalized anxiety disorder 2023     Lymphedema of left arm 2023       LOS (days): 3  Geometric Mean LOS (GMLOS) (days): 3.2  Days to GMLOS:0.4     OBJECTIVE:    Risk of Unplanned Readmission Score: 8.37     Current admission status: Inpatient    Preferred Pharmacy:   [facility]/pharmacy #[id number] [place] [address]  Phone: [phone] Fax: [phone]    Primary Care Provider: Lovely Kim DO    Primary Insurance: [facility]  Secondary Insurance:     ASSESSMENT:  Active Health Care Proxies    There are no active Health Care Proxies on file.       Advance Directives  Does patient have a Health Care POA?: No  Was patient offered paperwork?: Yes (HCR/AMDD form given to pt)  Does patient currently have a Health Care decision maker?: Yes, please see Health Care Proxy section  Does patient have Advance Directives?: No  Was patient offered paperwork?: Yes (HCR/AMDD form given to pt)  Primary Contact: Ro Coulter    Readmission Root Cause  30 Day  Readmission: No    Patient Information  Admitted from:: Home  Mental Status: Alert  During Assessment patient was accompanied by: Daughter  Assessment information provided by:: Patient, Daughter  Primary Caregiver: Family  Caregiver's Name:: Ro Coulter  Caregiver's Relationship to Patient:: Family Member (daughter)  Caregiver's Telephone Number:: [phone]  Support Systems: Daughter, Family members  County of Residence: [place]  What city do you live in?: [place]  Home entry access options. Select all that apply.: Stairs  Number of steps to enter home.:  (2 landing +3 into house)  Do the steps have railings?: Yes  Type of Current Residence: [address] home  Upon entering residence, is there a bedroom on the main floor (no further steps)?: Yes  Upon entering residence, is there a bathroom on the main floor (no further steps)?: Yes  Living Arrangements: Lives w/ Daughter    Activities of Daily Living Prior to Admission  Functional Status: Independent  Completes ADLs independently?: Yes  Ambulates independently?: Yes  Does patient use assisted devices?: No  Does patient currently own DME?: Yes  What DME does the patient currently own?: Bedside Commode, Straight Cane  Does patient have a history of Outpatient Therapy (PT/OT)?: Yes (currently attending for PT following shoulder surgery and lymphedema therapy)  Does the patient have a history of Short-Term Rehab?: No  Does patient have a history of HHC?: Yes  Does patient currently have HHC?: No    Patient Information Continued  Income Source: Pension/[employer]  Does patient have prescription coverage?: Yes ([facility])  Does patient receive dialysis treatments?: No  Does patient have a history of substance abuse?: No  Does patient have a history of Mental Health Diagnosis?: Yes (depression, anxiety)  Is patient receiving treatment for mental health?: Yes (managed by PCP)    Means of Transportation  Means of Transport to \[facility] [place] [facility]\"":: Family transport      Social  Determinants of Health (SDOH)      Flowsheet Row Most Recent Value   Housing Stability    In the last 12 months, was there a time when you were not able to pay the mortgage or rent on time? N   In the past 12 months, how many times have you moved where you were living? 0   At any time in the past 12 months, were you homeless or living in a shelter (including now)? N   Transportation Needs    In the past 12 months, has lack of transportation kept you from medical appointments or from getting medications? no   In the past 12 months, has lack of transportation kept you from meetings, work, or from getting things needed for daily living? No   Food Insecurity    Within the past 12 months, you worried that your food would run out before you got the money to buy more. Never true   Within the past 12 months, the food you bought just didn't last and you didn't have money to get more. Never true   Utilities    In the past 12 months has the electric, gas, oil, or water company threatened to shut off services in your home? No            DISCHARGE DETAILS:    Discharge planning discussed with:: Patient and daughter, Ro Coulter  Freedom of Choice: Yes  Comments - Freedom of Choice: SW met with pt and daughter to assess needs and discuss plans.  Pt and daughter are planning on pt returning home today.  Per daughter pt is currently attending outpatient therapy lymphedema therapy and PT following shoulder surgery. Pt and daughter intend to continue with outpatient therapy.  SW did provide daughter with Health Care Representative and Advanced Medical Directive Decision form as requested.  Daughter also inquired about activities that might be available for pt in the community like senior centers so [facility] brochure was given to daughter so she can explore options. No other discharge needs expressed.  CM contacted family/caregiver?: Yes  Were Treatment Team discharge recommendations reviewed with  patient/caregiver?: Yes  Did patient/caregiver verbalize understanding of patient care needs?: Yes    Contacts  Patient Contacts: Ro Coulter  Relationship to Patient:: Family (daughter)  Contact Method: In Person  Reason/Outcome: Discharge Planning    Requested Home Health Care         Is the patient interested in HHC at discharge?: No    DME Referral Provided  Referral made for DME?: No    Other Referral/Resources/Interventions Provided:  Interventions: Advanced Directives, Other (Specify) ([facility])    Treatment Team Recommendation: Home  Discharge Destination Plan:: Home  Transport at Discharge : Family    IMM Given (Date):: 09/09/24  IMM Given to:: Patient (IMM #2 reviewed with pt and daughter. Both verbalized understanding and agree with discharge determination.  Pt signed IMM and copy given. Copy also placed in scan bin for chart.)

## 2024-09-09 NOTE — PLAN OF CARE
Problem: Potential for Falls  Goal: Patient will remain free of falls  Description: INTERVENTIONS:  - Educate patient/family on patient safety including physical limitations  - Instruct patient to call for assistance with activity   - Consult OT/PT to assist with strengthening/mobility   - Keep Call bell within reach  - Keep bed low and locked with side rails adjusted as appropriate  - Keep care items and personal belongings within reach  - Initiate and maintain comfort rounds  - Make Fall Risk Sign visible to staff  - Offer Toileting every 2 Hours, in advance of need  - Initiate/Maintain bed alarm  - Apply yellow socks and bracelet for high fall risk patients  - Consider moving patient to room near nurses station  9/9/2024 1352 by Raina Cruz, JEWELS  Outcome: Adequate for Discharge  9/9/2024 1011 by Raina Cruz RN  Outcome: Progressing     Problem: PAIN - ADULT  Goal: Verbalizes/displays adequate comfort level or baseline comfort level  Description: Interventions:  - Encourage patient to monitor pain and request assistance  - Assess pain using appropriate pain scale  - Administer analgesics based on type and severity of pain and evaluate response  - Implement non-pharmacological measures as appropriate and evaluate response  - Consider cultural and social influences on pain and pain management  - Notify physician/advanced practitioner if interventions unsuccessful or patient reports new pain  9/9/2024 1352 by Raina Cruz, RN  Outcome: Adequate for Discharge  9/9/2024 1011 by Raina Cruz RN  Outcome: Progressing     Problem: INFECTION - ADULT  Goal: Absence or prevention of progression during hospitalization  Description: INTERVENTIONS:  - Assess and monitor for signs and symptoms of infection  - Monitor lab/diagnostic results  - Monitor all insertion sites, i.e. indwelling lines, tubes, and drains  - Monitor endotracheal if appropriate and nasal secretions for changes in amount and  color  - Juliette appropriate cooling/warming therapies per order  - Administer medications as ordered  - Instruct and encourage patient and family to use good hand hygiene technique  - Identify and instruct in appropriate isolation precautions for identified infection/condition  9/9/2024 1352 by Raina Cruz RN  Outcome: Adequate for Discharge  9/9/2024 1011 by Raina Cruz RN  Outcome: Progressing     Problem: SAFETY ADULT  Goal: Patient will remain free of falls  Description: INTERVENTIONS:  - Educate patient/family on patient safety including physical limitations  - Instruct patient to call for assistance with activity   - Consult OT/PT to assist with strengthening/mobility   - Keep Call bell within reach  - Keep bed low and locked with side rails adjusted as appropriate  - Keep care items and personal belongings within reach  - Initiate and maintain comfort rounds  - Make Fall Risk Sign visible to staff  - Offer Toileting every 2 Hours, in advance of need  - Initiate/Maintain bed alarm  - Apply yellow socks and bracelet for high fall risk patients  - Consider moving patient to room near nurses station  9/9/2024 1352 by Raina Cruz RN  Outcome: Adequate for Discharge  9/9/2024 1011 by Raina Cruz RN  Outcome: Progressing  Goal: Maintain or return to baseline ADL function  Description: INTERVENTIONS:  -  Assess patient's ability to carry out ADLs; assess patient's baseline for ADL function and identify physical deficits which impact ability to perform ADLs (bathing, care of mouth/teeth, toileting, grooming, dressing, etc.)  - Assess/evaluate cause of self-care deficits   - Assess range of motion  - Assess patient's mobility; develop plan if impaired  - Assess patient's need for assistive devices and provide as appropriate  - Encourage maximum independence but intervene and supervise when necessary  - Involve family in performance of ADLs  - Assess for home care needs following  discharge   - Consider OT consult to assist with ADL evaluation and planning for discharge  - Provide patient education as appropriate  9/9/2024 1352 by Raina Cruz RN  Outcome: Adequate for Discharge  9/9/2024 1011 by Raina Cruz RN  Outcome: Progressing  Goal: Maintains/Returns to pre admission functional level  Description: INTERVENTIONS:  - Perform AM-PAC 6 Click Basic Mobility/ Daily Activity assessment daily.  - Set and communicate daily mobility goal to care team and patient/family/caregiver.   - Collaborate with rehabilitation services on mobility goals if consulted  - Perform Range of Motion 3 times a day.  - Reposition patient every 2 hours.  - Dangle patient 3 times a day  - Stand patient 4 times a day  - Ambulate patient 3 times a day  - Out of bed to chair 3 times a day   - Out of bed for meals 3 times a day  - Out of bed for toileting  - Record patient progress and toleration of activity level   9/9/2024 1352 by Raina Cruz RN  Outcome: Adequate for Discharge  9/9/2024 1011 by Raina Cruz RN  Outcome: Progressing     Problem: CARDIOVASCULAR - ADULT  Goal: Maintains optimal cardiac output and hemodynamic stability  Description: INTERVENTIONS:  - Monitor I/O, vital signs and rhythm  - Monitor for S/S and trends of decreased cardiac output  - Administer and titrate ordered vasoactive medications to optimize hemodynamic stability  - Assess quality of pulses, skin color and temperature  - Assess for signs of decreased coronary artery perfusion  - Instruct patient to report change in severity of symptoms  9/9/2024 1352 by Raina Cruz RN  Outcome: Adequate for Discharge  9/9/2024 1011 by Raina Cruz RN  Outcome: Progressing     Problem: SKIN/TISSUE INTEGRITY - ADULT  Goal: Incision(s), wounds(s) or drain site(s) healing without S/S of infection  Description: INTERVENTIONS  - Assess and document dressing, incision, wound bed, drain sites and surrounding tissue  -  Provide patient and family education  - Perform skin care/dressing changes as per order  9/9/2024 1352 by Raina Cruz, RN  Outcome: Adequate for Discharge  9/9/2024 1011 by Raina Cruz, RN  Outcome: Progressing

## 2024-09-11 LAB
BACTERIA BLD CULT: NORMAL
BACTERIA BLD CULT: NORMAL

## 2024-09-17 ENCOUNTER — OFFICE VISIT (OUTPATIENT)
Dept: PHYSICAL THERAPY | Facility: CLINIC | Age: 79
End: 2024-09-17
Payer: COMMERCIAL

## 2024-09-17 DIAGNOSIS — M25.512 ACUTE PAIN OF LEFT SHOULDER: ICD-10-CM

## 2024-09-17 DIAGNOSIS — Z85.3 HISTORY OF BREAST CANCER IN FEMALE: ICD-10-CM

## 2024-09-17 DIAGNOSIS — Z96.612 STATUS POST REVERSE TOTAL REPLACEMENT OF LEFT SHOULDER: ICD-10-CM

## 2024-09-17 DIAGNOSIS — I89.0 LYMPHEDEMA OF LEFT ARM: ICD-10-CM

## 2024-09-17 DIAGNOSIS — S42.212D CLOSED DISPLACED FRACTURE OF SURGICAL NECK OF LEFT HUMERUS WITH ROUTINE HEALING, UNSPECIFIED FRACTURE MORPHOLOGY, SUBSEQUENT ENCOUNTER: Primary | ICD-10-CM

## 2024-09-17 PROCEDURE — 97110 THERAPEUTIC EXERCISES: CPT

## 2024-09-17 NOTE — PROGRESS NOTES
Daily Note     Today's date: 2024  Patient name: Eulalio Raphael  : 1945  MRN: [id number]  Referring provider: Tigre Kaufman DO  Dx:   Encounter Diagnosis     ICD-10-CM    1. Closed displaced fracture of surgical neck of left humerus with routine healing, unspecified fracture morphology, subsequent encounter  S42.212D       2. Acute pain of left shoulder  M25.512       3. History of breast cancer in female  Z85.3       4. Status post reverse total replacement of left shoulder  Z96.612       5. Lymphedema of left arm  I89.0                      Subjective: Patient arrived 15 minutes late for today's appt. Since her last visit, she developed cellulitis in her LUE as a result of a dog bite. She states that she is no longer taking medication and feel her arm is back to baseline.       Objective: See treatment diary below      Assessment: Tolerated treatment fair. Continued with established POC emphasizing gradual improvements in ROM and strength. Patient's daughter was not in attendance of today's session to discuss carryover at home. Advised patient of importance of performing at home which patient indicated she is not. Also reviewed attendance policy. Patient demonstrated fatigue post treatment, exhibited good technique with therapeutic exercises, and would benefit from continued PT to promote independent mobility of left arm.       Plan: Continue per plan of care.  Progress treatment as tolerated.         Precautions: Precautions: Standard. Hx Breast cancer and mastectomy >17 years ago. L UE lymphedema. MD f/u 24.        Insurance:  AMA/CMS Eval/ Re-eval POC expires FOTO Auth Status Co-Insurance    - KW       CMS - LX   22 visits total approved  Ref. ID  [id number]                                        2. 4 RE lymph   3. 4    lymph   4. 4  lymph   5.   5/1 shoulder 6.   5/9 lymph   7. ortho   8. 5/ lymph   9. ortho   10.   5/30 RE lymph 11. ortho 12.   6/12 lymph   13. [place]  "6/13   14. Ortho 6/25   15.    16.  17.  18.      19.    20.   21. Ortho 8/12   22.  Ortho 8/21 23.   24.      25.    26. 27. 28. 29. 30.   31. 32.  33. 34. 35. 36.        Visit 8 9 10 11 12 Re-eval 13 14 15    7/1 7/9 7/24 8/7/24 8/12/24 8/21/24 9/4/24 9/17/24                                    Neuro Re-Ed           Scap isos Flex, abd, ext 20 x 5 sec each   Flex, abd,ext x20 hold 5 sec  Scap setting x20    B ER w/ scap sq red x20 Scap setting x20    B ER w/ scap sq red x20 Scap setting x20   Active flexion AAROM cane 2x10    Ecc x15 Ecc 25x  Ecc 3x10   Ecc control x15 total     Rows  GTB  3x10   GTB 2x10 5.5# 2x10 Pink band 2x10 Pink band 2x10 Pink band 2x10   Shoulder ext  GTB 3x10    GTB 2x10 5.5# 2x10 Pink band 2x10 Pink band 2x10 Pink band 2x10                                    Ther Ex           Table slides Ball slides on table into flexion and abd x30 each Ball slides on table into flexion and abd x30 each Ball slides on table into flexion and abd x30 each Ball slides on table into flexion and abd x30 ea Wall slides       Supine HHA flex OH  Cane 30x  Cane 30x  Cane 30x Cane 30x Hand held overhead flexion x10 in semi reclined, x10 in sitting, x10 w/ ecc control  Hand held overhead     Supine x10 Supine hand held OH x20   SL ER AROM 20x YTB AROM 20x ytb  AROM 20x ytb  -  Isos x15 in semi-reclined Iso 10\" x10 semi-reclined position     PROM MR 8 min  MR 8 min  KH x 10 min RL x 10 min  CP x 10 min total  MR x8 min  KH x8 min    Scap sq 5\" x20 5\"x20  X20 hold 5 sec       Wand abd AAROM X20    X20 abd/ER hold 3 sec ea, semi reclined  rev 20x flexion*    20x abduction*     *with scap resetting  20x flexion*    20x abduction*     *with scap resetting    Wall slides  Attempted single arm     Pulleys 20x Pulleys 20x   Pulley x 20 x       Supine horizontal abd   YTB 2x10 - YTB 2x10   Pink TB  Pink TB  x20       AAROM flexion  semi reclined x20 hold 5       Ther Activity           HEP                RE x 20 min "     PROM family member edu 5 min                                        Modalities                                   Access Code: [email or web address]  URL: [email or web address]  Date: 06/13/2024  Prepared by: Consuelo Méndez    Exercises  - Seated Shoulder Flexion Towel Slide at Table Top  - 1 x daily - 7 x weekly - 3 sets - 10 reps  - Seated Shoulder Abduction Towel Slide at Table Top  - 1 x daily - 7 x weekly - 3 sets - 10 reps

## 2024-09-18 ENCOUNTER — OFFICE VISIT (OUTPATIENT)
Dept: PHYSICAL THERAPY | Facility: CLINIC | Age: 79
End: 2024-09-18
Payer: COMMERCIAL

## 2024-09-18 DIAGNOSIS — Z85.3 HISTORY OF BREAST CANCER IN FEMALE: ICD-10-CM

## 2024-09-18 DIAGNOSIS — I89.0 LYMPHEDEMA OF LEFT ARM: Primary | ICD-10-CM

## 2024-09-18 PROCEDURE — 97140 MANUAL THERAPY 1/> REGIONS: CPT | Performed by: PHYSICAL THERAPIST

## 2024-09-18 NOTE — PROGRESS NOTES
Lymphedema Re-Evaluation    Today's Date: 2024  Patient name: Eulalio Raphael  : 1945  MRN: [id number]  Referring provider: Tigre Kaufman DO  Dx:  Encounter Diagnosis     ICD-10-CM    1. Lymphedema of left arm  I89.0       2. History of breast cancer in female  Z85.3             Start Time: 1000  Stop Time: 1045  Total time in clinic (min): 45 minutes       Assessment/Plan  Eulalio Raphael is a 78 y.o. year old female with complaints of L UE swelling. Patient referred to lymphedema therapy by PT Marleni Ramos.  Patient's presentation is consistent with secondary UE lymphedema secondary to L humeral fracture with L reverse TSA repair with PMH of L breast cancer and subsequent L UE lymphedema with the following impairments found on evaluation: swelling, decreased ROM, decreased strength, and functional limitations including reaching, lifting, and overhead activities.  Eulalio has made the following improvements since beginning PT: LXPTREEVAL: decreased pain, decreased swelling, decreased fibrosis, and increased ROM . Patient has lost 10% volume in L UE since beginning PT, despite most recent hospitalization in the hospital previous week for cellulitis of L UE and has finished course of antibiotics. She verbalizes compliance with sleeve, but reports non-compliance with reduction kit. Overall, patient has had large gaps in care for lymphedema therapy with most recent 24. Eulalio continues to present with the impairments as listed above. Patient would continue to benefit from skilled PT to address these deficits and to maximize function.       Relevant medical history includes PMH L breast cancer. The listed physical impairments contribute to the following functional limitations: decreased tolerance to reaching, lifting, overhead activity; and the following disability: difficulty making meals, donning and doffing clothing, reaching, and lifting. Eulalio is a good candidate for both orthopedic physical  therapy to address elevated pain levels, decreased ROM, strength, and functional limitations and lymphedema physical therapy to address increased swelling and pain, which is affecting her orthopedic deficits and would benefit from skilled physical therapy to address the above impairments in order to allow the patient to achieve the goals listed and return to prior level of function. Lymphedema Physical therapy plan of care to include manual lymph drainage, compression garments, skin management. Compression prescription includes: TBD. Insurance coverage for compression supplies: TBD    During initial evaluation, education was provided on lymphatic anatomy and physiology, edema differential diagnosis, edema risk reduction behaviors, and healthy habits; decongestion vs maintenance treatment options. Recommendations were made for skin care, elevation of the edematous limb, and muscle pump exercises to address edema and patient consented to these recommendations. Patient also educated on diagnosis, plan of care and prognosis.  Eulalio is in agreement with recommended plan of care and goals for therapy, and demonstrates motivation for active participation in proposed plan of care. Care coordinated with PT Marleni Ramos- patient to be seen twice per week for PT- 1x/wk with PT Marleni Ramos for orthopedic management and 1x/wk with PT Debbie Kimbrough for swelling and lymphedema treatment, which is impacting her L UE healing and impairments.     Plan  Patient would benefit from: skilled physical therapy  Referral necessary: No  Planned therapy interventions: manual therapy, massage, muscle pump exercises, neuromuscular re-education, therapeutic activities, therapeutic exercise, stretching, strengthening, graded activity, functional ROM exercises, flexibility and compression  Frequency: 2x week- Care coordinated with PT Marleni Ramos- patient to be seen twice per week for PT- 1x/wk with PT Marleni Ramos for orthopedic  "management and 1x/wk with PT Debbie Orourke for swelling and lymphedema treatment, which is impacting her L UE healing and impairments.   Duration in weeks: 12  Plan of Care beginning date: 7/30/24  Plan of Care expiration date: 11/30/24  Treatment plan discussed with: patient       Subjective/History:    RE 09/18/24     Eulalio has been seen for total of 10 visits for OP lymphedema PT for L UE swelling . Patient's global rating of change is \" Somewhat better (3).\" Patient reports improvements with size of L UE and fibrosis. Patient demonstrating 10% volume reduction in L UE since beginning PT. Patient received reduction kit, but continues to wear L UE sleeve. PT educating patient on importance of compliance with reduction kit for volume reduction. PT also reiterating importance of compliance with attendance as patient has only been seen 10 visits over the course of 5 months. Patient reports most difficulty with lifting UE. Patient would continue to benefit from skilled PT to address remaining deficits and maximize function.      Initial Evaluation  Chief Complaint: L UE swelling  HPI: Per evaluation performed by PT Marleni Ramos on 4/11/24: \"Prior to surgery pt was experiencing lymphedema in L UE. She had a fall 2 months ago suffering from humerus fracture 1/30/24 and underwent reverse TSA 2/1/24. Pt had home health coming in. Pt still having stiffness but felt her home PT went well. Pain usually about 7/10 in her shoulder. Pt feel;s she has pain with all activity so is limited in mobility and functionality. She acknowledges she is performing about half of her ADLs currently. Pt is unable to meal prep and is having assistance form her daughter on a regular basis for self care. Occass taking tylenol. \"  Prior Edema Treatments:   Imaging: x-ray revealing closed nondisplaced fracture of L clavicle and L humeral fracture  Relevant PMHx: Pt has PMH of L breast cancer with subsequent L UE swelling   Personal " "history of Cancer? Yes; PMH L breast cancer; original diagnosis 17 years ago, 5 years later recurred and swelling present in L UE ever since. \"Removed all of them\" referring to lymph nodes. Bilateral mastectomy with lymph node transplant.     Lymphedema special questions  Feeling of heaviness, fullness, pressure? yes  Resolves with elevation: No  Sleeping location? In bed,   Change in fit of shoes/clothes/jewelry?yes  History of Cellulitis?  no  History of S/DVT? no    Systems Review:  Cardiovascular hx: Yes; HTN, diabetes  Thyroid dysfunction: Yes; papillary thyroid carcinoma  Diabetes: Yes  Kidney disease:No   Liver disease: No  Abdominal surgeries: Yes  Orthopedic injuries/surgeries: Yes; L reverse TSA to repair L humeral fracture after patient sustained a fall.     Pain: 6/10 current; 8/10 with movement  Function: Limited with making a meal, reaching, dressing, household chores  Working Status: Retired  Exercise status: N/A  Patient goals: \"Pt goal to be able to reach her arm up and hold her arm up so she can return to her typical daily tasks.\"      Objective  Lymphedema Exam: Upper Extremity  Hand dominance: R hand  Upper extremity Edema location and quality: left  Fingers: most swelling present at base of fingers  Dorsum of hand: Present  Palm: None  Wrist: Present  Forearm: Present  Elbow: Present  Upper arm: Present  Shoulder: Minimal  Lymphedema classification (0 latent, 1 reverse, 2 non-rev, 3 elephantiasis): grade 2  >> REFER TO FLOW SHEET FOR GIRTH MEASUREMENTS <<  Axillary Web Syndrome? no    Skin Assessment:  Stemmer's sign? no  Fibrosis (0 normal - 4 >severe): grade 2  Scar present: yes; scar from reverse TSA demonstrates good healing, no bleeding or drainage  Surface anatomy changes: Yes  Bony prominences: Limited view of L styloid process in wrist  Tendon pathways: Limited view of tendons of L hand;   Joint creases: Distorted L elbow crease and wrist crease; RE 9/18/24 patient demonstrating improved " visualization of knuckles and increased wrinkles in L hand    Functional Capacity:  Gait assessment: Patient independently walks with quad cane in R UE, no remarkable antalgias or deficits besides increased visual reliance when ambulating  Transfer status: able to independently supine <> sit and sit <> stand throughout evaluation  Ability to don/doff clothing/garments: Patient needs assist with L UE with donning jacket   Upper quarter Sensation: Normal  Upper quarter Range of Motion: Normal R UE, abnormal L UE secondary to precautions/ restrictions from reverse TSA  L wrist flexion/ extension: WFL  L elbow flexion/ extension AROM: 130 degrees flexion, full extension achieved  Upper Quarter Strength: Abnormal, L UE not tested per MD reverse TSA protocol  R shoulder flexion, abduction, ER/IR at side : 4/5 when tested in sitting  L elbow flexion/ extension: 3/5  L wrist flexion/ extension: 3/5  L shoulder MMT not tested secondary to protocol     UPPER EXTREMITY LEFT CALCULATIONS      Flowsheet Row Most Recent Value   Volume UE (mL) 4873   Difference from last visit (mL)  200   Difference from first visit (mL)  512   Difference from last visit (%)  4   Difference from first visit (%)  10             Precautions: Precautions: Standard. Hx Breast cancer and m,mastectomy >17 years ago. L UE lymphedema. MD f/u 5/6/24.      Insurance:  AMA/CMS Eval/ Re-eval POC expires FOTO Auth Status Co-Insurance    4/11- KW       CMS 4/18- LX   22 visits total approved  Ref. ID  [id number]                                     4/11   2. 4/18 RE lymph   3. 4/22    lymph   4. 4/30  lymph   5.   5/1 shoulder 6.   5/9 lymph   7. ortho   8. 5/23 lymph   9. ortho   10.   5/30 RE lymph 11. ortho 12.   6/12 lymph   13. 6/13 ortho   14. 6/25 ortho   15. 7/1 ortho 16. 7/8 lymph 17. 7/11 lymph 18. 7/24 ortho     19. 7/30 lymph   20. 8/7 ortho   21. 8/12 ortho   22. 8/21 ortho   23. 9/4 ortho   24. 9/17 ortho     25. 9/18 lymph    26. 27. 28. 29. 30.    31. 32.  33. 34. 35. 36.        Compression Rx 6/12 7/11/ 7/30 5/9 5/23 5/30                     Manual Ther         volumetrics    Done    Done    MLD 53 min  25 min X53 min x50 X53 min Done    Compression Bandaging         Wound Care           Garment fitting x 15 min  X10 garment measuring     Ther Ex         Remedial Exercise                                    Ther Activity & Self Care         Skin care         Garment Fit/Train         Sleeping position                  Pt Education         Edema differential dx         Lymphatic A&P Done        Compression options Done

## 2024-09-23 ENCOUNTER — OFFICE VISIT (OUTPATIENT)
Dept: FAMILY MEDICINE CLINIC | Facility: CLINIC | Age: 79
End: 2024-09-23
Payer: COMMERCIAL

## 2024-09-23 ENCOUNTER — HOSPITAL ENCOUNTER (OUTPATIENT)
Dept: RADIOLOGY | Facility: HOSPITAL | Age: 79
Discharge: HOME | End: 2024-09-23
Payer: COMMERCIAL

## 2024-09-23 VITALS
HEART RATE: 48 BPM | SYSTOLIC BLOOD PRESSURE: 148 MMHG | TEMPERATURE: 97.6 F | OXYGEN SATURATION: 97 % | HEIGHT: 62 IN | WEIGHT: 142 LBS | BODY MASS INDEX: 26.13 KG/M2 | DIASTOLIC BLOOD PRESSURE: 68 MMHG | RESPIRATION RATE: 18 BRPM

## 2024-09-23 DIAGNOSIS — I10 ESSENTIAL HYPERTENSION: Primary | ICD-10-CM

## 2024-09-23 DIAGNOSIS — Z23 NEED FOR PNEUMOCOCCAL 20-VALENT CONJUGATE VACCINATION: ICD-10-CM

## 2024-09-23 DIAGNOSIS — L03.114 CELLULITIS OF LEFT UPPER EXTREMITY: ICD-10-CM

## 2024-09-23 DIAGNOSIS — E07.9 THYROID MASS: ICD-10-CM

## 2024-09-23 DIAGNOSIS — Z23 NEED FOR INFLUENZA VACCINATION: ICD-10-CM
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
## 2024-09-23 PROBLEM — S41.152A DOG BITE OF LEFT ARM: Status: RESOLVED | Noted: 2024-09-06 | Resolved: 2024-09-23

## 2024-09-23 PROCEDURE — G0008 ADMIN INFLUENZA VIRUS VAC: HCPCS

## 2024-09-23 PROCEDURE — 99495 TRANSJ CARE MGMT MOD F2F 14D: CPT | Performed by: FAMILY MEDICINE

## 2024-09-23 PROCEDURE — G0009 ADMIN PNEUMOCOCCAL VACCINE: HCPCS

## 2024-09-23 PROCEDURE — 90677 PCV20 VACCINE IM: CPT

## 2024-09-23 PROCEDURE — 76536 US EXAM OF HEAD AND NECK: CPT

## 2024-09-23 PROCEDURE — 90662 IIV NO PRSV INCREASED AG IM: CPT

## 2024-09-23 NOTE — PROGRESS NOTES
Transition of Care Visit  Name: Eulalio Raphael      : 1945      MRN: [id number]  Encounter Provider: Lovely Kim DO  Encounter Date: 2024   Encounter department: [facility]    Assessment & Plan  Essential hypertension  Blood pressure slightly elevated today  Will recheck again next month  Continue amlodipine 10 mg daily       Need for influenza vaccination    Orders:    influenza vaccine, high-dose, PF 0.7 mL (FLUZONE HIGH-DOSE)    Cellulitis of left upper extremity  Resolved         Need for pneumococcal 20-valent conjugate vaccination    Orders:    Pneumococcal Conjugate Vaccine 20-valent (Pcv20)     RSV vaccine recommended    Return for October as scheduled .    History of Present Illness     Transitional Care Management Review:   Eulalio Raphael is a 78 y.o. female here for TCM follow up.     During the TCM phone call patient stated:  TCM Call       Date and time call was made  2024  3:20 PM    Hospital care reviewed  Records reviewed    Patient was hospitialized at  [facility]    Date of Admission  24    Date of discharge  24    Diagnosis  Cellulitis    Disposition  Home    Were the patients medications reviewed and updated  No  SHe declined but knows to call if any questions    Current Symptoms  None          TCM Call       Post hospital issues  None    Should patient be enrolled in anticoag monitoring?  No    Scheduled for follow up?  Yes    Referrals needed  Tigre Kaufman DO (Orthopedic)    Did you obtain your prescribed medications  Yes    Do you need help managing your prescriptions or medications  No    Is transportation to your appointment needed  Yes    Specify why  She does not drive    I have advised the patient to call PCP with any new or worsening symptoms  Getachew Bryan    Living Arrangements  Children    Support System  Family    The type of support provided  Physical; Emotional    Do you have social support  Yes, as much as I need    Are you  "recieving any outpatient services  Yes    What type of services  PT    Are you recieving home care services  No    Types of home care services  Home health aid; Home PT    Are you using any community resources  No    Current waiver services  No    Have you fallen in the last 12 months  Yes    How many times  once    Interperter language line needed  No    Counseling  Family; Patient    Counseling topics  Activities of daily living; Importance of RX compliance; patient and family education; instructions for management; Risk factor reduction    Comments  I spoke with Eulalio and her daughter together on speaker phone as she was going to her PT session. She currently denies any complaints. They know to call if any questions [email or web address]          She is here today for follow-up of her recent hospital stay.  Her dog bit her left arm.  She has chronic lymphedema in that arm.  She had some discomfort for couple days and then it got significantly worse quickly.  She was admitted and treated with IV antibiotics.  She is currently feeling much better.  She is completed her course of antibiotics.  She no longer has any fevers or discomfort at the site.      Review of Systems  Objective     /68   Pulse (!) 48   Temp 97.6 °F (36.4 °C)   Resp 18   Ht 5' 2\" (1.575 m)   Wt 64.4 kg (142 lb)   SpO2 97%   BMI 25.97 kg/m²     Physical Exam  Vitals and nursing note reviewed.   Constitutional:       General: She is not in acute distress.     Appearance: She is well-developed.   HENT:      Head: Normocephalic and atraumatic.      Right Ear: Tympanic membrane normal.      Left Ear: Tympanic membrane normal.   Cardiovascular:      Rate and Rhythm: Normal rate and regular rhythm.      Heart sounds: No murmur heard.  Pulmonary:      Effort: Pulmonary effort is normal. No respiratory distress.      Breath sounds: Normal breath sounds.   Musculoskeletal:         General: Swelling (Chronic lymphedema left arm) present.      Cervical " back: Neck supple.   Neurological:      Mental Status: She is alert.       Medications have been reviewed by provider in current encounter    Administrative Statements

## 2024-09-23 NOTE — ASSESSMENT & PLAN NOTE
Blood pressure slightly elevated today  Will recheck again next month  Continue amlodipine 10 mg daily        Metoprolol transition to atenolol suspension  Amiodarone on hold - medications cannot be crushed and given through the G-tube per surgery   Coumadin for anticoagulation  Monitor INR

## 2024-10-02 ENCOUNTER — OFFICE VISIT (OUTPATIENT)
Dept: PHYSICAL THERAPY | Facility: CLINIC | Age: 79
End: 2024-10-02
Payer: COMMERCIAL

## 2024-10-02 DIAGNOSIS — Z85.3 HISTORY OF BREAST CANCER IN FEMALE: ICD-10-CM

## 2024-10-02 DIAGNOSIS — I89.0 LYMPHEDEMA OF LEFT ARM: Primary | ICD-10-CM

## 2024-10-02 PROCEDURE — 97140 MANUAL THERAPY 1/> REGIONS: CPT | Performed by: PHYSICAL THERAPIST

## 2024-10-02 NOTE — PROGRESS NOTES
Daily Note     Today's date: 10/2/2024  Patient name: Eulalio Raphael  : 1945  MRN: [id number]  Referring provider: Tigre Kaufman DO  Dx:   Encounter Diagnosis     ICD-10-CM    1. Lymphedema of left arm  I89.0       2. History of breast cancer in female  Z85.3                                  Subjective: She notes the arm is going down overnight but will blow back up if she doesn't have it wrapped during the day.        Objective: See treatment diary below         Assessment: Tolerated treatment well.  MLD Sequence included:  diaphragmatic breathing, short neck sequence, superficial abdominal sequence, stimulation of bilateral axillary lymph nodes, L inguinal lymph nodes and interaxillary watershed.  Patient is doing well overall with volume.  Skin with baggy appearance now secondary to volume loss.  Some adjustments made to reduction garment due to decrease volume.  Patient and daughter report increased consistently with wrapping at night with either reduction garment or ace bandage.  Patient would benefit from continued PT      Plan: Continue per plan of care.  Progress treatment as tolerated.        Precautions: Precautions: Standard. Hx Breast cancer and m,mastectomy >17 years ago. L UE lymphedema. MD f/u 24.      Insurance:  AMA/CMS Eval/ Re-eval POC expires FOTO Auth Status Co-Insurance    - KW       CMS - LX   22 visits total approved  Ref. ID  [id number]                                          2. 4/ RE lymph   3. 4/22    lymph   4. 4/  lymph   5.   5/1 shoulder 6.   5/9 lymph   7. ortho   8. 5/ lymph   9. ortho   10.   5/30 RE lymph 11. ortho 12.   6/12 lymph   13. 6/13 ortho   14. 6/25 ortho   15. 7/1 ortho 16. 7/8 lymph 17. 7/11 lymph 18. 7/24 ortho     19. 7/30 lymph   20. 8/7 ortho   21. 8/12 ortho   22. 8/21 ortho   23. 9/4 ortho   24. 9/17 ortho     25. 9/18 lymph    26.10/2 lymph 27. 28. 29. 30.   31. 32.  33. 34. 35. 36.        Compression Rx / 7/30 9/18 10/2  5/30                     Manual Ther         volumetrics    Done    Done    MLD 53 min  25 min X53 min  X45 min Done    Compression Bandaging         Wound Care           Garment fitting x 15 min       Ther Ex         Remedial Exercise                                    Ther Activity & Self Care         Skin care         Garment Fit/Train         Sleeping position                  Pt Education         Edema differential dx         Lymphatic A&P Done        Compression options Done

## 2024-10-03 ENCOUNTER — OFFICE VISIT (OUTPATIENT)
Dept: PHYSICAL THERAPY | Facility: CLINIC | Age: 79
End: 2024-10-03
Payer: COMMERCIAL

## 2024-10-03 DIAGNOSIS — Z96.612 STATUS POST REVERSE TOTAL REPLACEMENT OF LEFT SHOULDER: Primary | ICD-10-CM

## 2024-10-03 DIAGNOSIS — M25.512 ACUTE PAIN OF LEFT SHOULDER: ICD-10-CM

## 2024-10-03 DIAGNOSIS — S42.212D CLOSED DISPLACED FRACTURE OF SURGICAL NECK OF LEFT HUMERUS WITH ROUTINE HEALING, UNSPECIFIED FRACTURE MORPHOLOGY, SUBSEQUENT ENCOUNTER: ICD-10-CM

## 2024-10-03 PROCEDURE — 97112 NEUROMUSCULAR REEDUCATION: CPT | Performed by: PHYSICAL THERAPIST

## 2024-10-03 PROCEDURE — 97110 THERAPEUTIC EXERCISES: CPT | Performed by: PHYSICAL THERAPIST

## 2024-10-03 NOTE — PROGRESS NOTES
Daily Note     Today's date: 10/3/2024  Patient name: uElalio Raphael  : 1945  MRN: [id number]  Referring provider: Tigre Kaufman DO  Dx:   Encounter Diagnosis     ICD-10-CM    1. Status post reverse total replacement of left shoulder  Z96.612       2. Closed displaced fracture of surgical neck of left humerus with routine healing, unspecified fracture morphology, subsequent encounter  S42.212D       3. Acute pain of left shoulder  M25.512           Start Time: 1145  Stop Time: 1225  Total time in clinic (min): 40 minutes    Subjective: Pt reporting min pain L shoulder -3/10. Pt continue with lymph management appts and noticeable edema L arm continues    Objective: See treatment diary below      Assessment: Tolerated treatment fair. Continued with established POC emphasizing gradual improvements in ROM and strength. Reviewed importance of AAROM and progression to AROM shoulder flexion. Pt able ot achieve 90 flexion in supine position and 70 degrees in seated. Patient demonstrated fatigue post treatment, exhibited good technique with therapeutic exercises, and would benefit from continued PT to promote independent mobility of left arm.       Plan: Continue per plan of care.  Progress treatment as tolerated.  Re-eval NV       Precautions: Precautions: Standard. Hx Breast cancer and mastectomy >17 years ago. L UE lymphedema. MD f/u 24.        Insurance:  AMA/CMS Eval/ Re-eval POC expires FOTO Auth Status Co-Insurance    - KW       CMS - LX   22 visits total approved  Ref. ID  [id number]                                        2. 4 RE lymph   3. 4    lymph   4. 4  lymph   5.   5/1 shoulder 6.   5/9 lymph   7. ortho   8. 5 lymph   9. ortho   10.   5 RE lymph 11. ortho 12.   6 lymph   13. [place]    14. Ortho    15.    16.  17.  18.      19.    20.   21. Ortho    22.  Ortho  23.   24.      25.    26. 27. 28. 29. 30.   31. 32.  33. 34. 35. 36.        Visit 11   "Re-eval 13 14 15 16    8/7/24 8/12/24 8/21/24 9/4/24 9/17/24 10/3                              Neuro Re-Ed         Scap isos Flex, abd,ext x20 hold 5 sec  Scap setting x20    B ER w/ scap sq red x20 Scap setting x20    B ER w/ scap sq red x20 Scap setting x20    Active flexion   Ecc control x15 total   Wall flexion x 10 AAROM   Rows  GTB 2x10 5.5# 2x10 Pink band 2x10 Pink band 2x10 Pink band 2x10 Green TB x30   Shoulder ext  GTB 2x10 5.5# 2x10 Pink band 2x10 Pink band 2x10 Pink band 2x10 Green TB x30                              Ther Ex         Table slides Ball slides on table into flexion and abd x30 ea Wall slides        Supine HHA flex OH Cane 30x Cane 30x Hand held overhead flexion x10 in semi reclined, x10 in sitting, x10 w/ ecc control  Hand held overhead     Supine x10 Supine hand held OH x20 Supine hand held x 30 AAROM supine    AROM supine x10  Focus on eccentric lowering   SL ER -  Isos x15 in semi-reclined Iso 10\" x10 semi-reclined position   X10 with eccentric lowering to neutral   PROM RL x 10 min  CP x 10 min total  MR x8 min  KH x8 min  RL x 8 min,  P/AAROM all directions   Scap sq X20 hold 5 sec     X20 hold 5   Wand abd AAROM X20 abd/ER hold 3 sec ea, semi reclined  rev 20x flexion*    20x abduction*     *with scap resetting  20x flexion*    20x abduction*     *with scap resetting  AAROM cane   seated x30    20x abduction with scap setting   Wall slides  Pulley x 20 x        Supine horizontal abd - YTB 2x10   Pink TB  Pink TB  x20     AAROM flexion  semi reclined x20 hold 5        Ther Activity         HEP           RE x 20 min     PROM family member edu 5 min                                   Modalities                             Access Code: [email or web address]  URL: [email or web address]  Date: 06/13/2024  Prepared by: Consuelo Méndez    Exercises  - Seated Shoulder Flexion Towel Slide at Table Top  - 1 x daily - 7 x weekly - 3 sets - 10 reps  - Seated Shoulder Abduction Towel Slide at Table Top  " - 1 x daily - 7 x weekly - 3 sets - 10 reps

## 2024-10-08 ENCOUNTER — EVALUATION (OUTPATIENT)
Dept: PHYSICAL THERAPY | Facility: CLINIC | Age: 79
End: 2024-10-08
Payer: COMMERCIAL

## 2024-10-08 DIAGNOSIS — Z96.612 STATUS POST REVERSE TOTAL REPLACEMENT OF LEFT SHOULDER: Primary | ICD-10-CM

## 2024-10-08 DIAGNOSIS — M25.512 ACUTE PAIN OF LEFT SHOULDER: ICD-10-CM

## 2024-10-08 DIAGNOSIS — S42.212D CLOSED DISPLACED FRACTURE OF SURGICAL NECK OF LEFT HUMERUS WITH ROUTINE HEALING, UNSPECIFIED FRACTURE MORPHOLOGY, SUBSEQUENT ENCOUNTER: ICD-10-CM

## 2024-10-08 PROCEDURE — 97530 THERAPEUTIC ACTIVITIES: CPT

## 2024-10-08 PROCEDURE — 97110 THERAPEUTIC EXERCISES: CPT

## 2024-10-08 NOTE — PROGRESS NOTES
Daily Note     Today's date: 10/8/2024  Patient name: Eulalio Raphael  : 1945  MRN: [id number]  Referring provider: Tigre Kaufman DO  Dx:   Encounter Diagnosis     ICD-10-CM    1. Status post reverse total replacement of left shoulder  Z96.612       2. Acute pain of left shoulder  M25.512       3. Closed displaced fracture of surgical neck of left humerus with routine healing, unspecified fracture morphology, subsequent encounter  S42.547Z               Assessment: Eulalio Raphael has been attending OPPT for 17 visits over the course of 25 weeks. During this time she has made significant progress in shoulder range of motion and strength. With these gains she is now able to perform majority of her activities of daily living without limitation secondary to pain. However, despite these gains she remains limited in active shoulder flexion and abduction in standing. At this time this limitation has begun to plateau. Despite this limitation, Eulalio reports no limitation to her daily activities. Due to this, it is best that Eulalio Raphael transition from orthopedic PT to independent HEP. She has 1 visit scheduled next week, during that time a handout will be provided and reviewed with her.  POC was discussed with Eulalio and her daughter. They verbally agreed with no additional questions and/or concerns at this time. Eulalio Raphael was provided with contact information in the event a question and/or concern were to arise post today's session. Thank you again for your referral and the opportunity to share in Eulalio Raphael's care.      Plan: Discharge to independent HEP next visit.         Subjective: Eulalio Raphael reports overall improvement in symptoms. She states she has been able to perform all her daily activities with modifications as necessary.     Goals (24: Met unless otherwise specified)  Short Term Goals to be accomplished in 3 weeks:  STG1: Pt will be I with HEP Progressing 24  STG2: Pt will  be I with posture management   STG3: Pt will demo 50% improvement in shoulder AROM to improve self care and household ADLs Progressing 8/12/24  STG4: Pt will demo 1/2 MMT strength in shoulder Progressing (shldr flex/abduction 1/3 MMT improvement)  STG5: Pt will report pain < 3/10 in shoulder     Long Term Goals to be accomplished in 6 weeks: (ALL PROGRESSING)  LTG1: Pt will demo full shoulder AROM to return to household duties NOT MET  LTG2: Pt will return to self care/household ADLs pain free as per PLOF MET  LTG3: Pt will demo shoulder strength WNL MET    Objective: See treatment diary below      Postural Observations  Seated posture: poor  Standing posture: fair  (8/12/24: improved overall seated posture with decreased thoracic kyphosis and rounded shoulders)  (10/8/24: grossly similar)    Observations     Additional Observation Details  Significant Lymphedema throughout L UE  (8/12/24: grossly similar)  (10/8/24: grossly similar)    Active Range of Motion   Left Shoulder   Flexion: 40 degrees   Abduction: 55 degrees   External rotation 0°: -28 degrees   Internal rotation BTB: Active internal rotation behind the back: L back pocket.     Left Shoulder (8/12/24)  Flexion: 50 degrees   Abduction: 60 degree   External rotation at 0°: -20 degrees   Internal rotation BTB: Active internal rotation behind back: L back pocket.    Left Shoulder (8/12/24)  Flexion: 50 degrees   Abduction:75 degree   External rotation at 0°: ear   Internal rotation BTB: Active internal rotation behind back: L SIJ    Passive Range of Motion   Left Shoulder   Flexion: 115 degrees   Abduction: 120 degrees   External rotation 0°: -20 degrees     Left Shoulder (8/12/24)  Flexion: 120 degrees   Abduction: 120 degrees   External rotation 0°: -10 degrees     Left Shoulder (10/8/24)  Flexion: 120 degrees   Abduction: 120 degrees   External rotation 0°: 10 degrees    Strength/Myotome Testing     Right Shoulder   Normal muscle strength    Additional  Strength Details  L shldr and elbow strength 3- to 3/5 MMT throughout  (8/12/24: L shoulder -3/5 shoulder flexion and abduction; 4/5 elbow strength MMT)   (10/8/24: L shoulder 3-/5 flexion and abduction; 4+/5 elbow MMT)       Precautions: Precautions: Standard. Hx Breast cancer and mastectomy >17 years ago. L UE lymphedema. MD f/u 5/6/24.    Insurance:  AMA/CMS Eval/ Re-eval POC expires FOTO Auth Status Co-Insurance    4/11- KW       CMS 4/18- LX   22 visits total approved  Ref. ID  [id number]                                     4/11   2. 4/18 RE lymph   3. 4/22    lymph   4. 4/30  lymph   5.   5/1 shoulder 6.   5/9 lymph   7. ortho   8. 5/23 lymph   9. ortho   10.   5/30 RE lymph 11. ortho 12.   6/12 lymph   13. [place] 6/13   14. Ortho 6/25   15.    16.  17.  18.      19.    20.   21. Ortho 8/12   22.  Ortho 8/21 23.   24.      25.    26. 27. 28. 29. 30.   31. 32.  33. 34. 35. 36.        Visit 11 12 Re-eval 13 14 15 16 17    8/7/24 8/12/24 8/21/24 9/4/24 9/17/24 10/3 10/8                                 Neuro Re-Ed          Scap isos Flex, abd,ext x20 hold 5 sec  Scap setting x20    B ER w/ scap sq red x20 Scap setting x20    B ER w/ scap sq red x20 Scap setting x20     Active flexion   Ecc control x15 total   Wall flexion x 10 AAROM    Rows  GTB 2x10 5.5# 2x10 Pink band 2x10 Pink band 2x10 Pink band 2x10 Green TB x30 Green TB x30    Shoulder ext  GTB 2x10 5.5# 2x10 Pink band 2x10 Pink band 2x10 Pink band 2x10 Green TB x30 Green TB x30                                  Ther Ex          Table slides Ball slides on table into flexion and abd x30 ea Wall slides         Supine HHA flex OH Cane 30x Cane 30x Hand held overhead flexion x10 in semi reclined, x10 in sitting, x10 w/ ecc control  Hand held overhead     Supine x10 Supine hand held OH x20 Supine hand held x 30 AAROM supine    AROM supine x10  Focus on eccentric lowering Supine hand held x 30 AAROM supine    AROM supine x10  Focus on eccentric lowering   SL ER -   "Isos x15 in semi-reclined Iso 10\" x10 semi-reclined position   X10 with eccentric lowering to neutral    PROM RL x 10 min  CP x 10 min total  MR x8 min  KH x8 min  RL x 8 min,  P/AAROM all directions    Scap sq X20 hold 5 sec     X20 hold 5 X20 hold 5    Wand abd AAROM X20 abd/ER hold 3 sec ea, semi reclined  rev 20x flexion*    20x abduction*     *with scap resetting  20x flexion*    20x abduction*     *with scap resetting  AAROM cane   seated x30    20x abduction with scap setting AAROM cane   seated x30    20x abduction with scap setting   Wall slides  [place] x 20 x         Supine horizontal abd - YTB 2x10   Pink TB  Pink TB  x20      AAROM flexion  semi reclined x20 hold 5         Ther Activity          HEP            RE x 20 min     PROM family member edu 5 min      RE x 20 min     Edu to patient and daughter regarding POC                                 Modalities                                Access Code: [email or web address]  URL: [email or web address]  Date: 06/13/2024  Prepared by: Consuelo Méndez    Exercises  - Seated Shoulder Flexion Towel Slide at Table Top  - 1 x daily - 7 x weekly - 3 sets - 10 reps  - Seated Shoulder Abduction Towel Slide at Table Top  - 1 x daily - 7 x weekly - 3 sets - 10 reps                 "

## 2024-10-09 ENCOUNTER — APPOINTMENT (OUTPATIENT)
Dept: PHYSICAL THERAPY | Facility: CLINIC | Age: 79
End: 2024-10-09
Payer: COMMERCIAL

## 2024-10-17 ENCOUNTER — OFFICE VISIT (OUTPATIENT)
Dept: PHYSICAL THERAPY | Facility: CLINIC | Age: 79
End: 2024-10-17
Payer: COMMERCIAL

## 2024-10-17 DIAGNOSIS — Z96.612 STATUS POST REVERSE TOTAL REPLACEMENT OF LEFT SHOULDER: Primary | ICD-10-CM

## 2024-10-17 DIAGNOSIS — E03.9 ACQUIRED HYPOTHYROIDISM: ICD-10-CM

## 2024-10-17 DIAGNOSIS — Z85.3 HISTORY OF BREAST CANCER IN FEMALE: ICD-10-CM

## 2024-10-17 DIAGNOSIS — M25.512 ACUTE PAIN OF LEFT SHOULDER: ICD-10-CM

## 2024-10-17 DIAGNOSIS — S42.212D CLOSED DISPLACED FRACTURE OF SURGICAL NECK OF LEFT HUMERUS WITH ROUTINE HEALING, UNSPECIFIED FRACTURE MORPHOLOGY, SUBSEQUENT ENCOUNTER: ICD-10-CM

## 2024-10-17 DIAGNOSIS — I89.0 LYMPHEDEMA OF LEFT ARM: ICD-10-CM

## 2024-10-17 DIAGNOSIS — I10 ESSENTIAL HYPERTENSION: ICD-10-CM

## 2024-10-17 PROCEDURE — 97112 NEUROMUSCULAR REEDUCATION: CPT

## 2024-10-17 PROCEDURE — 97110 THERAPEUTIC EXERCISES: CPT

## 2024-10-17 RX ORDER — LEVOTHYROXINE SODIUM 25 UG/1
25 TABLET ORAL DAILY
Qty: 90 TABLET | Refills: 1 | Status: SHIPPED | OUTPATIENT
Start: 2024-10-17

## 2024-10-17 RX ORDER — AMLODIPINE BESYLATE 10 MG/1
TABLET ORAL
Qty: 90 TABLET | Refills: 1 | Status: SHIPPED | OUTPATIENT
Start: 2024-10-17

## 2024-10-17 NOTE — PROGRESS NOTES
Daily Note     Today's date: 10/17/2024  Patient name: Eulalio Raphael  : 1945  MRN: [id number]  Referring provider: Tigre Kaufman DO  Dx:   Encounter Diagnosis     ICD-10-CM    1. Status post reverse total replacement of left shoulder  Z96.612       2. Acute pain of left shoulder  M25.512       3. Closed displaced fracture of surgical neck of left humerus with routine healing, unspecified fracture morphology, subsequent encounter  S42.212D       4. Lymphedema of left arm  I89.0       5. History of breast cancer in female  Z85.3                      Subjective: P      Objective: See treatment diary below      Assessment: Patient has attended 17 visits of skilled PT over 6 months. Patient has had infrequent attendance and has plateaued in terms of progression towards her goals. At this time, patient is appropriate for D/C from skilled PT services with comprehensive HEP. Patient expresses thorough understanding of this plan.      Plan: D/C today.        Precautions: Precautions: Standard. Hx Breast cancer and mastectomy >17 years ago. L UE lymphedema. MD f/u 24.        Insurance:  AMA/CMS Eval/ Re-eval POC expires FOTO Auth Status Co-Insurance    - KW       CMS - LX   22 visits total approved  Ref. ID  [id number]                                        2. 4 RE lymph   3. 4    lymph   4. 4  lymph   5.   5/ shoulder 6.   5/9 lymph   7. ortho   8.  lymph   9. ortho   10.    RE lymph 11. ortho 12.    lymph   13. [place]    14. Ortho    15.    16.  17.  18.      19.    20.   21. Ortho    22.  Ortho  23.   24.      25.    26. 27. 28. 29. 30.   31. 32.  33. 34. 35. 36.        Visit 12 Re-eval 13 14 15 16 17    8/12/24 8/21/24 9/4/24 9/17/24 10/3 10/17                              Neuro Re-Ed         Scap isos  Scap setting x20    B ER w/ scap sq red x20 Scap setting x20    B ER w/ scap sq red x20 Scap setting x20     Active flexion  Ecc control x15 total   Wall  "flexion x 10 AAROM    Rows  5.5# 2x10 Pink band 2x10 Pink band 2x10 Pink band 2x10 Green TB x30 Green TB x30   Shoulder ext  5.5# 2x10 Pink band 2x10 Pink band 2x10 Pink band 2x10 Green TB x30 Green TB x30                              Ther Ex         Table slides Wall slides         Supine HHA flex OH Cane 30x Hand held overhead flexion x10 in semi reclined, x10 in sitting, x10 w/ ecc control  Hand held overhead     Supine x10 Supine hand held OH x20 Supine hand held x 30 AAROM supine    AROM supine x10  Focus on eccentric lowering Supine hand held x 30 AAROM supine    AROM supine x10  Focus on eccentric lowering   SL ER  Isos x15 in semi-reclined Iso 10\" x10 semi-reclined position   X10 with eccentric lowering to neutral X10 with eccentric lowering to neutral   PROM  CP x 10 min total  MR x8 min  KH x8 min  RL x 8 min,  P/AAROM all directions    Scap sq     X20 hold 5    Wand abd AAROM  rev 20x flexion*    20x abduction*     *with scap resetting  20x flexion*    20x abduction*     *with scap resetting  AAROM cane   seated x30    20x abduction with scap setting AAROM cane   seated x30    20x abduction with scap setting   Wall slides          Supine horizontal abd YTB 2x10   Pink TB  Pink TB  x20              Ther Activity         HEP          RE x 20 min     PROM family member edu 5 min                                    Modalities                             Access Code: [email or web address]  URL: [email or web address]  Date: 06/13/2024  Prepared by: Consuelo Méndez    Exercises  - Seated Shoulder Flexion Towel Slide at Table Top  - 1 x daily - 7 x weekly - 3 sets - 10 reps  - Seated Shoulder Abduction Towel Slide at Table Top  - 1 x daily - 7 x weekly - 3 sets - 10 reps                 "

## 2024-11-07 ENCOUNTER — OFFICE VISIT (OUTPATIENT)
Dept: FAMILY MEDICINE CLINIC | Facility: CLINIC | Age: 79
End: 2024-11-07
Payer: COMMERCIAL

## 2024-11-07 VITALS
HEIGHT: 62 IN | BODY MASS INDEX: 25.95 KG/M2 | WEIGHT: 141.02 LBS | DIASTOLIC BLOOD PRESSURE: 70 MMHG | RESPIRATION RATE: 17 BRPM | TEMPERATURE: 98 F | SYSTOLIC BLOOD PRESSURE: 142 MMHG | HEART RATE: 56 BPM

## 2024-11-07 DIAGNOSIS — E11.9 TYPE 2 DIABETES MELLITUS WITHOUT COMPLICATION, WITHOUT LONG-TERM CURRENT USE OF INSULIN (HCC): ICD-10-CM

## 2024-11-07 DIAGNOSIS — I10 ESSENTIAL HYPERTENSION: ICD-10-CM

## 2024-11-07 DIAGNOSIS — L02.91 ABSCESS: Primary | ICD-10-CM

## 2024-11-07 PROCEDURE — 99214 OFFICE O/P EST MOD 30 MIN: CPT | Performed by: NURSE PRACTITIONER

## 2024-11-07 PROCEDURE — 10060 I&D ABSCESS SIMPLE/SINGLE: CPT | Performed by: NURSE PRACTITIONER

## 2024-11-07 PROCEDURE — 87205 SMEAR GRAM STAIN: CPT | Performed by: NURSE PRACTITIONER

## 2024-11-07 PROCEDURE — 87070 CULTURE OTHR SPECIMN AEROBIC: CPT | Performed by: NURSE PRACTITIONER

## 2024-11-07 RX ORDER — MUPIROCIN 20 MG/G
OINTMENT TOPICAL 2 TIMES DAILY
Qty: 60 G | Refills: 0 | Status: SHIPPED | OUTPATIENT
Start: 2024-11-07

## 2024-11-07 RX ORDER — CEPHALEXIN 500 MG/1
500 CAPSULE ORAL EVERY 12 HOURS SCHEDULED
Qty: 20 CAPSULE | Refills: 0 | Status: SHIPPED | OUTPATIENT
Start: 2024-11-07 | End: 2024-11-17

## 2024-11-07 NOTE — PROGRESS NOTES
Ambulatory Visit  Name: Eulalio Raphael      : 1945      MRN: [id number]  Encounter Provider: OMER Sauceda  Encounter Date: 2024   Encounter department: [facility]    Assessment & Plan  Abscess  Advised to do warm compresses on the area too.    Orders:    cephalexin (KEFLEX) 500 mg capsule; Take 1 capsule (500 mg total) by mouth every 12 (twelve) hours for 10 days    mupirocin (BACTROBAN) 2 % ointment; Apply topically 2 (two) times a day    Wound culture and Gram stain    Incision and Drainage    Essential hypertension  Stable with current regimen         Type 2 diabetes mellitus without complication, without long-term current use of insulin (HCC)  On metformin  Lab Results   Component Value Date    HGBA1C 6.2 (H) 2024               History of Present Illness     Patient stated that noticed sore on right ankle about 3 weeks ago and not healing. Denies any injury, and falls. Denies fever, and chills  Complaint with medications for chronic illnesses          Review of Systems   HENT: Negative.     Respiratory: Negative.     Cardiovascular: Negative.    Gastrointestinal: Negative.    Skin:         As noted in HPI         Current Outpatient Medications on File Prior to Visit   Medication Sig Dispense Refill    acetaminophen (TYLENOL) 325 mg tablet Take 3 tablets (975 mg total) by mouth every 8 (eight) hours as needed for mild pain      amLODIPine (NORVASC) 10 mg tablet TAKE 1 TABLET BY MOUTH EVERY DAY 90 tablet 1    levothyroxine 25 mcg tablet TAKE 1 TABLET BY MOUTH EVERY DAY 90 tablet 1    metFORMIN (GLUCOPHAGE) 1000 MG tablet Take 0.5 tablets (500 mg total) by mouth daily with breakfast      Multiple Vitamin (multivitamin) capsule Take 1 capsule by mouth daily      PARoxetine (PAXIL) 20 mg tablet TAKE 1 TABLET BY MOUTH EVERY DAY 90 tablet 1     No current facility-administered medications on file prior to visit.          Objective     /70   Pulse 56   Temp 98 °F (36.7  "°C)   Resp 17   Ht 5' 2\" (1.575 m)   Wt 64 kg (141 lb 0.3 oz)   BMI 25.79 kg/m²     Physical Exam  HENT:      Head: Normocephalic.      Right Ear: External ear normal.      Left Ear: External ear normal.      Nose: Nose normal.   Eyes:      Conjunctiva/sclera: Conjunctivae normal.   Cardiovascular:      Rate and Rhythm: Normal rate and regular rhythm.      Heart sounds: Normal heart sounds.   Pulmonary:      Effort: Pulmonary effort is normal.      Breath sounds: Normal breath sounds.   Musculoskeletal:      Cervical back: Normal range of motion.   Skin:     General: Skin is warm and dry.      Findings: Abscess (on right ankle about 1 cm in diameter) present.   Neurological:      Mental Status: She is alert and oriented to person, place, and time.   Psychiatric:         Mood and Affect: Mood normal.         Behavior: Behavior normal.         Thought Content: Thought content normal.         Judgment: Judgment normal.           Incision and Drainage    Date/Time: 11/7/2024 1:03 PM    Performed by: OMER Sauceda  Authorized by: OMER Sauceda  Universal Protocol:  Procedure performed by:  Consent: Verbal consent obtained. Written consent not obtained.  Risks and benefits: risks, benefits and alternatives were discussed  Consent given by: patient  Time out: Immediately prior to procedure a \"time out\" was called to verify the correct patient, procedure, equipment, support staff and site/side marked as required.  Timeout called at: 11/7/2024 1:03 PM.  Patient understanding: patient states understanding of the procedure being performed  Patient consent: the patient's understanding of the procedure matches consent given  Site marked: the operative site was marked  Patient identity confirmed: verbally with patient    Patient location:  Bedside  Location:     Type:  Abscess    Location:  Lower extremity    Lower extremity location:  R ankle  Pre-procedure details:     Skin preparation:  Betadine  Anesthesia (see " MAR for exact dosages):     Anesthesia method:  None  Procedure details:     Complexity:  Simple    Needle aspiration: no      Incision types:  Stab incision    Scalpel blade:  11    Approach:  Puncture    Incision depth:  Superficial    Drainage:  Purulent    Drainage amount:  Scant    Packing materials:  None  Post-procedure details:     Patient tolerance of procedure:  Tolerated well, no immediate complications

## 2024-11-07 NOTE — PATIENT INSTRUCTIONS
Patient Education     Cephalexin (renetta amanda CM in)   Brand Names: US Keflex [DSC]   Brand Names: Shona APO-Cephalex; AURO-Cephalexin; JAMP-Cephalexin; LUPIN-Cephalexin; TEVA-Cephalexin; TEVA-Cephalexin 125; TEVA-Cephalexin 250   What is this drug used for?   It is used to treat bacterial infections.  What do I need to tell my doctor BEFORE I take this drug?   If you are allergic to this drug; any part of this drug; or any other drugs, foods, or substances. Tell your doctor about the allergy and what signs you had.  If you are taking probenecid.  This is not a list of all drugs or health problems that interact with this drug.  Tell your doctor and pharmacist about all of your drugs (prescription or OTC, natural products, vitamins) and health problems. You must check to make sure that it is safe for you to take this drug with all of your drugs and health problems. Do not start, stop, or change the dose of any drug without checking with your doctor.  What are some things I need to know or do while I take this drug?   Tell all of your health care providers that you take this drug. This includes your doctors, nurses, pharmacists, and dentists.  Have your blood work checked if you are on this drug for a long time. Talk with your doctor.  If you have high blood sugar (diabetes) and test your urine glucose, talk with your doctor to find out which tests are best to use.  This drug may affect certain lab tests. Tell all of your health care providers and lab workers that you take this drug.  Do not use longer than you have been told. A second infection may happen.  Tell your doctor if you are pregnant, plan on getting pregnant, or are breast-feeding. You will need to talk about the benefits and risks to you and the baby.  What are some side effects that I need to call my doctor about right away?   WARNING/CAUTION: Even though it may be rare, some people may have very bad and sometimes deadly side effects when taking a drug.  Tell your doctor or get medical help right away if you have any of the following signs or symptoms that may be related to a very bad side effect:  Signs of an allergic reaction, like rash; hives; itching; red, swollen, blistered, or peeling skin with or without fever; wheezing; tightness in the chest or throat; trouble breathing, swallowing, or talking; unusual hoarseness; or swelling of the mouth, face, lips, tongue, or throat.  Signs of a very bad skin reaction (Rossi-Broderick syndrome/toxic epidermal necrolysis) like red, swollen, blistered, or peeling skin (with or without fever); red or irritated eyes; or sores in the mouth, throat, nose, or eyes.  Signs of liver problems like dark urine, tiredness, decreased appetite, upset stomach or stomach pain, light-colored stools, throwing up, or yellow skin or eyes.  Fever, chills, or sore throat; any unexplained bruising or bleeding; or feeling very tired or weak.  Feeling confused.  Hallucinations (seeing or hearing things that are not there).  Not able to pass urine or change in how much urine is passed.  Seizures.  Very bad dizziness.  Very bad headache.  Very bad joint pain.  Vaginal itching or discharge.  Diarrhea is common with antibiotics. Rarely, a severe form called C diff-associated diarrhea (CDAD) may happen. Sometimes, this has led to a deadly bowel problem. CDAD may happen during or a few months after taking antibiotics. Call your doctor right away if you have stomach pain, cramps, or very loose, watery, or bloody stools. Check with your doctor before treating diarrhea.  What are some other side effects of this drug?   All drugs may cause side effects. However, many people have no side effects or only have minor side effects. Call your doctor or get medical help if any of these side effects or any other side effects bother you or do not go away:  Stomach pain or diarrhea.  Upset stomach or throwing up.  These are not all of the side effects that may  occur. If you have questions about side effects, call your doctor. Call your doctor for medical advice about side effects.  You may report side effects to your national health agency.  You may report side effects to the FDA at 1-770.953.4545. You may also report side effects at https://www.fda.gov/medwatch.  How is this drug best taken?   Use this drug as ordered by your doctor. Read all information given to you. Follow all instructions closely.  All products:   Take with or without food. Take with food if it causes an upset stomach.  Use as you have been told, even if your signs get better.  Liquid (suspension):   Shake well before use.  Measure liquid doses carefully. Use the measuring device that comes with this drug. If there is none, ask the pharmacist for a device to measure this drug.  What do I do if I miss a dose?   Take a missed dose as soon as you think about it.  If it is close to the time for your next dose, skip the missed dose and go back to your normal time.  Do not take 2 doses at the same time or extra doses.  How do I store and/or throw out this drug?   Tablets and capsules:   Store at room temperature in a dry place. Do not store in a bathroom.  Liquid (suspension):   Store in a refrigerator. Throw away any part not used after 2 weeks.  Keep lid tightly closed.  All products:   Keep all drugs in a safe place. Keep all drugs out of the reach of children and pets.  Throw away unused or  drugs. Do not flush down a toilet or pour down a drain unless you are told to do so. Check with your pharmacist if you have questions about the best way to throw out drugs. There may be drug take-back programs in your area.  General drug facts   If your symptoms or health problems do not get better or if they become worse, call your doctor.  Do not share your drugs with others and do not take anyone else's drugs.  Some drugs may have another patient information leaflet. If you have any questions about this  drug, please talk with your doctor, nurse, pharmacist, or other health care provider.  Some drugs may have another patient information leaflet. Check with your pharmacist. If you have any questions about this drug, please talk with your doctor, nurse, pharmacist, or other health care provider.  If you think there has been an overdose, call your poison control center or get medical care right away. Be ready to tell or show what was taken, how much, and when it happened.  Consumer Information Use and Disclaimer   This generalized information is a limited summary of diagnosis, treatment, and/or medication information. It is not meant to be comprehensive and should be used as a tool to help the user understand and/or assess potential diagnostic and treatment options. It does NOT include all information about conditions, treatments, medications, side effects, or risks that may apply to a specific patient. It is not intended to be medical advice or a substitute for the medical advice, diagnosis, or treatment of a health care provider based on the health care provider's examination and assessment of a patient's specific and unique circumstances. Patients must speak with a health care provider for complete information about their health, medical questions, and treatment options, including any risks or benefits regarding use of medications. This information does not endorse any treatments or medications as safe, effective, or approved for treating a specific patient. [employer] and its affiliates disclaim any warranty or liability relating to this information or the use thereof. The use of this information is governed by the Terms of Use, available at https://www.wolterskluwer.com/en/know/clinical-effectiveness-terms.  Last Reviewed Date   2022-07-29  Copyright   © 2024 [employer] and its affiliates and/or licensors. All rights reserved.

## 2024-11-10 LAB
BACTERIA WND AEROBE CULT: NO GROWTH
GRAM STN SPEC: NORMAL
GRAM STN SPEC: NORMAL

## 2024-11-13 ENCOUNTER — VBI (OUTPATIENT)
Dept: ADMINISTRATIVE | Facility: OTHER | Age: 79
End: 2024-11-13

## 2024-11-14 NOTE — TELEPHONE ENCOUNTER
11/13/24 7:26 PM     Chart reviewed for Hemoglobin A1c, Immunization(s) blood pressure, and Microalbumin Creatinine Urine Ratio OR Albumin Creatinine Urine Ratio  was/were submitted to the patient's insurance.     Hilda Valerio   [facility]

## 2024-12-16 ENCOUNTER — OFFICE VISIT (OUTPATIENT)
Dept: OBGYN CLINIC | Facility: CLINIC | Age: 79
End: 2024-12-16
Payer: COMMERCIAL

## 2024-12-16 ENCOUNTER — APPOINTMENT (OUTPATIENT)
Dept: RADIOLOGY | Facility: AMBULARY SURGERY CENTER | Age: 79
End: 2024-12-16
Attending: STUDENT IN AN ORGANIZED HEALTH CARE EDUCATION/TRAINING PROGRAM
Payer: COMMERCIAL

## 2024-12-16 VITALS — HEIGHT: 62 IN | WEIGHT: 141.02 LBS | BODY MASS INDEX: 25.95 KG/M2

## 2024-12-16 DIAGNOSIS — S42.212D CLOSED DISPLACED FRACTURE OF SURGICAL NECK OF LEFT HUMERUS WITH ROUTINE HEALING, UNSPECIFIED FRACTURE MORPHOLOGY, SUBSEQUENT ENCOUNTER: ICD-10-CM

## 2024-12-16 PROCEDURE — 73030 X-RAY EXAM OF SHOULDER: CPT

## 2024-12-16 PROCEDURE — 99213 OFFICE O/P EST LOW 20 MIN: CPT | Performed by: STUDENT IN AN ORGANIZED HEALTH CARE EDUCATION/TRAINING PROGRAM

## 2024-12-16 NOTE — PROGRESS NOTES
Orthopaedics Office Visit - New Patient Visit    ASSESSMENT/PLAN:    Assessment:   Left shoulder reverse total shoulder arthroplasty for proximal humerus fracture, DOS: 2/1/2024    Plan:   Radiograph reviewed with patient and family member displaying well-maintained alignment of the patient's left reverse total shoulder arthroplasty with no signs of loosening or failure.  The tuberosities are still well-maintained and there is heterotopic ossification formation   Recommended the patient continue with physical therapy and home stretching exercises   Weightbearing as tolerated  Range of motion as tolerated  Continue outpatient physical therapy for ROM and strengthening. New script given.  Encouraged at home exercise program at this time  Patient will follow-up in 12 months for repeat x-ray evaluation and repeat range of motion check      _____________________________________________________  CHIEF COMPLAINT:  Chief Complaint   Patient presents with    Left Shoulder - Post-op         SUBJECTIVE:  Eulalio Raphael is a 79 y.o. female who presents 2 weeks s/p left shoulder reverse total shoulder arthroplasty, 2/1/2024.  She is here with female family member.  Today she complains of left generalized shoulder pain.  She has enrico compliant with immobilizer shoulder sling.  She does use Tylenol and oxycodone for pain.   She does use 4-point cane since her injury.  Patient overall states that her pain is significantly improved from her injury.  Denies any numbness or paresthesias in the left upper extremity    Interval history 3/25/2024  She is 8 weeks status post reverse total shoulder arthroplasty for proximal humerus fracture, DOS 2/1/2024. Today she denies any pain at rest. She has some pain after doing PT.  She takes Tylenol occasionally for pain.  She has been using her sling and coming out of it for range of motion exercises.  She is doing home physical therapy according to the [facility] protocol. She denies any  numbness or tingling in the left upper extremity.     Interval history 5/6/2024  She is 3 months out from her left reverse total shoulder arthroplasty for her proximal humerus fracture. DOS 2/1/2024. She reports mild laterally based pain on the left shoulder that increases with range of motion exercises. She has been doing physical therapy once a week for range of motion and feels like she should do twice a week to increase her ROM.  She asked breast the physical therapy exercises she has been performing with physical therapy and most of these exercises were directed at increasing her mobility and function with her elbow range of motion.  She denies new injury or fall. She denies new numbness or tingling     Interval history 7/1/2024:    The patient presents 5 months s/p reverse total shoulder arthroplasty s/p proximal humerus fracture, 2/1/2024.  She is doing well and progressing.  The patient states that she has little to no pain at rest and with gentle activities.  She takes Tylenol occasionally and sparingly for discomfort.  She does use Voltaren gel and rest with benefit.   She continues physical therapy with benefit.  Her lymphedema is stable in the left upper extremity.  The lymphedema in her left upper extremity is stable.  Patient is happy with her progress.  She denies new injury or fall. She denies new numbness or tingling     Interval history 12/16/2024 notes patient presents 10 months status post reverse total shoulder arthroplasty s/p proximal humerus fracture, 2/1/2024.  She is doing well and progressing.  The patient states that she has little to no pain at rest and with gentle activities.  She states pain is well-controlled on her current analgesic regimen.  She denies any new injuries or traumas.  Overall she is very happy.  Progression.  Her main issues since she stopped physical therapy she is lost a little bit of her range of motion and ability and strength.  She states that she is lost about  1020 degrees of abduction and forward flexion which makes it difficult to perform some daily tasks.  She is still able to maintain this range of motion laying down taking gravity out of the equation.  Denies any new numbness or paresthesias in the left upper extremity.      PAST MEDICAL HISTORY:  Past Medical History:   Diagnosis Date    Acute encephalopathy 2/3/2024    Breast cancer (HCC)     Diabetes mellitus (HCC)     Hypertension        PAST SURGICAL HISTORY:  Past Surgical History:   Procedure Laterality Date    HYSTERECTOMY      MASTECTOMY MODIFIED RADICAL Bilateral     REVERSE TOTAL SHOULDER ARTHROPLASTY Left 2/1/2024    Procedure: ARTHROPLASTY SHOULDER REVERSE, and all associated procedures;  Surgeon: Tigre Kaufman DO;  Location: [facility];  Service: Orthopedics    SPINE SURGERY      THYROIDECTOMY N/A 6/21/2023    Procedure: LEFT HEMITHYROIDECTOMY, SUBSTERNAL;  Surgeon: Leonel James MD;  Location: [facility];  Service: ENT       FAMILY HISTORY:  Family History   Problem Relation Age of Onset    Hypertension Father     Hypertension Sister     Hypertension Brother        SOCIAL HISTORY:  Social History     Tobacco Use    Smoking status: Never     Passive exposure: Never    Smokeless tobacco: Never   Vaping Use    Vaping status: Never Used   Substance Use Topics    Alcohol use: Not Currently     Alcohol/week: 1.0 standard drink of alcohol     Types: 1 Glasses of wine per week    Drug use: Never       MEDICATIONS:    Current Outpatient Medications:     acetaminophen (TYLENOL) 325 mg tablet, Take 3 tablets (975 mg total) by mouth every 8 (eight) hours as needed for mild pain, Disp: , Rfl:     amLODIPine (NORVASC) 10 mg tablet, TAKE 1 TABLET BY MOUTH EVERY DAY, Disp: 90 tablet, Rfl: 1    levothyroxine 25 mcg tablet, TAKE 1 TABLET BY MOUTH EVERY DAY, Disp: 90 tablet, Rfl: 1    metFORMIN (GLUCOPHAGE) 1000 MG tablet, Take 0.5 tablets (500 mg total) by mouth daily with breakfast, Disp: , Rfl:      "Multiple Vitamin (multivitamin) capsule, Take 1 capsule by mouth daily, Disp: , Rfl:     mupirocin (BACTROBAN) 2 % ointment, Apply topically 2 (two) times a day, Disp: 60 g, Rfl: 0    PARoxetine (PAXIL) 20 mg tablet, TAKE 1 TABLET BY MOUTH EVERY DAY, Disp: 90 tablet, Rfl: 1    ALLERGIES:  Allergies   Allergen Reactions    Ace Inhibitors Angioedema    Erythromycin Swelling       REVIEW OF SYSTEMS:  MSK: Left shoulder pain  Neuro: No numbness or paresthesias  Pertinent items are otherwise noted in HPI.  A comprehensive review of systems was otherwise negative.    LABS:  HgA1c:   Lab Results   Component Value Date    HGBA1C 6.2 (H) 09/07/2024     BMP:   Lab Results   Component Value Date    CALCIUM 9.1 09/09/2024    K 3.7 09/09/2024    CO2 27 09/09/2024     (H) 09/09/2024    BUN 9 09/09/2024    CREATININE 0.65 09/09/2024     CBC: No components found for: \"CBC\"    _____________________________________________________  PHYSICAL EXAMINATION:  Vital signs: There were no vitals taken for this visit.  General: No acute distress, awake and alert  Psychiatric: Mood and affect appear appropriate  HEENT: Trachea Midline, No torticollis, no apparent facial trauma  Cardiovascular: No audible murmurs; Extremities appear perfused  Pulmonary: No audible wheezing or stridor  Skin: No open lesions; see further details (if any) below    MUSCULOSKELETAL EXAMINATION:  Extremities:    Left shoulder:  The left shoulder was exposed inspected.  The patient's surgical incision is healed well without any surrounding erythema or dehiscence. Significant lymphedema in LUE which is chronic.  There is firmness to the left shoulder about the deltoid and along the incision consistent with heterotopic ossification. She is non-tender over the shoulder laterally.  Passive range of motion was performed on the shoulder to 120 degrees of forward flexion, 110 degrees of abduction 20 degrees of external rotation, internal rotation to side pocket.  " Active range of motion is similar with approximately 90 degrees of abduction and 90 degrees of forward flexion.  No discomfort throughout range of motion.   Patient's sensation is intact to light touch in the axillary, median, radial, ulnar nerve distributions.  AIN, PIN, ulnar, axillary nerves are intact.  +2 radial pulses distally      _____________________________________________________  STUDIES REVIEWED:  I personally reviewed the images and interpretation is as follows:  Left shoulder x-ray demonstrate: Well-maintained alignment of the patient's left reverse total shoulder arthroplasty with no signs of loosening or hardware failure.  Tuberosities are healed adjacent to the proximal end of the implant demonstrating appropriate position. There is heterotopic ossification around the implant which is matured    PROCEDURES PERFORMED:  Procedures none    Chris Gordillo PA-C

## 2025-01-13 ENCOUNTER — VBI (OUTPATIENT)
Dept: ADMINISTRATIVE | Facility: OTHER | Age: 80
End: 2025-01-13

## 2025-01-13 NOTE — TELEPHONE ENCOUNTER
01/13/25 8:52 AM     Chart reviewed for DEXA Scan ; nothing is submitted to the patient's insurance at this time.     Hilda Valerio   [facility]

## 2025-01-30 ENCOUNTER — EVALUATION (OUTPATIENT)
Dept: PHYSICAL THERAPY | Facility: CLINIC | Age: 80
End: 2025-01-30
Payer: COMMERCIAL

## 2025-01-30 DIAGNOSIS — S42.212D CLOSED DISPLACED FRACTURE OF SURGICAL NECK OF LEFT HUMERUS WITH ROUTINE HEALING, UNSPECIFIED FRACTURE MORPHOLOGY, SUBSEQUENT ENCOUNTER: Primary | ICD-10-CM

## 2025-01-30 PROCEDURE — 97110 THERAPEUTIC EXERCISES: CPT | Performed by: PHYSICAL THERAPIST

## 2025-01-30 PROCEDURE — 97162 PT EVAL MOD COMPLEX 30 MIN: CPT | Performed by: PHYSICAL THERAPIST

## 2025-01-30 NOTE — PROGRESS NOTES
PT Evaluation   Today's date: 2025  Patient name: Eulalio Raphael  : 1945  MRN: [id number]  Referring provider: Tigre Kaufman DO  Dx:   Encounter Diagnosis     ICD-10-CM    1. Closed displaced fracture of surgical neck of left humerus with routine healing, unspecified fracture morphology, subsequent encounter  S42.212D Ambulatory Referral to Physical Therapy            CASE SUMMARY:   Eulalio Raphael is a 79 y.o. year old female who presents to OPPT upon referral from ortho  secondary to left  shoulder reverse TSR follow up. At which time he referred patient back to PT for ROM and strengthening. Her reverse TSR: 2024.  Had PT post surgery was discharged in 2024.  Feels her status worsened since she discontinued PT.  States she did not do HEP in its entirety.  Eulalio reports onset of symptoms ~  post surgery .  Current symptom location includes left shoudler area  and patient describes  current symptoms as: sharp.  Symptoms are : intermittent.  Pain level:  Current: 0/10 and with ADL's 5/10.  Symptoms improve with: rest , and worsen with reaching.  Overall symptom progression at this time is worsening.   Previous injury to this area: humeral head fracture prior to surgery  Previous treatment includes: PT  Diagnostic testing includes: none recent.   Significant history of thom breast cancer and diagnosed ~ 6 years ago with treatment including chemo and radiation on left side.  Number of radiation treatments is unknown, is not currently taking maintenance medication for breast cancer.  + lymphedema PT.    PMHx includes: See chart for full details with medications, allergies, past family history, past medical history, social history, past surgical history and problem list. All topics were reviewed.  Has compression garments.      Functionally, at baseline, Eulalio is independent with all basic ADL's and assisitance advanced ADL's.  Currently, Eulalio is limited in the following  activities: difficulty combing her hair on left,  avoids heavy lifting,       Eulalio is lives w/family.   Recreational activities include: walks for exercise .  Eulalio Raphael is  retired.     Patient goal(s) for physical therapy is: to feel better in left UE.      Objective Data:       Posture  Sitting: + rounded shoudlers left greater then right and left shoudler is depressed.    Standing: as above    Cervical ROM  Active (sitting)  Flexion: wnl   Extension: mod LOM   Rotation: Right:  grossly wnl no pain   Left mod LOM + pain in left upper trap  Side bend: Right:  major LOM  no pain  Left major LOM + pain    Thoracic ROM:  Active: Flexion: min LOM    Extension: major LOM     Seated Rotation right: mod LOM    Seated Rotation left: mod LOM         Repeated motion assessment NT       Palpation: + TTP anterior shoulder        Functional reaching:  Overhead: Right:  WFL Left: 50 % limited  Behind head: Right: WFL left: 50 % limited  Behind back: Right: WFL left: able to reach lateral hip      UE MMT Right grossly wnl to  L Shoulder Flexion: 3-/5 R Shoulder Flexion: 4/5   L Shoulder Extension: 4/5 R Shoulder Extension: 4/5   L Shoulder Abduction: 3-/5 R Shoulder Abduction: 4/5   L Shoulder  IR: NT R Shoulder  IR: 4/5   L Shoulder  ER: NT R Shoulder  ER: 4/5   L Elbow Extension: 4+/5 R Elbow Extension: 4+/5   L Elbow Flexion: 4+/5 R Elbow Flexion: 4+/5       Shoulder PROM L R   Flexion 135  deg 155 deg   Extension Reduced 50% wnl deg   ER 38 deg Grossly wnl   IR 50  deg Grossly wnl   Abduction 130 deg Wnl    Elbow flexion Grossly wnl deg wnl deg   Elbow extension Grossly wnl deg  wnl deg         Assessment:     The following is a summary of Eulalio objective status:    Impairments:   Postural dysfunction:  Reduced ROM:  Reduced strength:  + TTP and increased soft tissue density  Reduced flexibility  Gait/elevation dysfunction  Reduced stability  Transfers impairments:  Reduced activity tolerance including above stated  functional limitations    Patient's clinical presentation is consistent with their referring diagnosis of:   1. Closed displaced fracture of surgical neck of left humerus with routine healing, unspecified fracture morphology, subsequent encounter  Ambulatory Referral to Physical Therapy      . Patient present with left shoudler pain s/p reverse TSR ~ 1 year ago. Patient participated in extensive PT following surgery until ~ October 2024. Visually noted is dipak has signficant lymphedema on left UE greatest being from left elbow throught digits. Patient described being marginally compliant with compression at best to contirol lymphedema. Increased time spent discussing with aptien tthe limitaitons of reverse total shoudler replacement and limitations of strength due to lack of RTC. This is then further complicated by the extension lymphedema which results in signficant weight in distal extremity which with further strain shoudler/scap musculature. Discussed this with patient and daughter as well as my recommendation to refer patient to another clinic ( [facility]) so she can be treated by an ortho PT who is also a CLT.  Contacted clinician who is agreeable to see patient and instructed patient and daughter process to schedule. patient vocalized understanding the relationship of excessie lymphedema in left forearm/had and its effect in difficulty reaching forward/overhead due to weakness in left shoudler.  Therefore patient to follow up with CLT unless scheduling is delayed, then she will continue with current PT at 1-2 x week until she can be transferred to CLT.      PLOC was discussed and agreed upon with patient.  Patient was educated on: PLOC and basic HEP .       Impairments: Abnormal muscle tone, Abnormal or restricted ROM, Impaired physical strength, Lacks appropriate HEP, Poor posture, Poor body mechanics, and Pain with function, lymphedema  Understanding of Dx/Px/POC: Good  Prognosis: Fair    Patient  vocalized a good understanding of  PLOC and HEP issued. Eulalio would benefit from skilled physical therapy services to address their aforementioned functional limitations and progress towards prior level of function, to meet stated goals,  and independence with home exercise program.  Prognosis for successful rehab outcome is fair with consistent participation in therapy  due to complications of lymphedema and carryover of HEP and PLOC.    ( New goals will be established after assessment for CLT)  STG  + Patient will report a 30% improvement in symptoms (2-3 weeks)   + Patient will be independent in basic HEP (2-3 weeks)  + Patient will demonstrate good improved posture with verbal cuing   (2-3 weeks)  + Patient will report increased ease reaching due to a reduction in pain (2-3 weeks)      LTG:  + Patient will report a 50% improvement in symptoms (4-6 weeks)   + Patient will increase FOTO score by 10 points (8 sessions)   + Patient will be independent in comprehensive HEP (4-6 weeks)  + Patient will demonstrate increase  MMT of  shoulder elevation to  3/5 for maximum function. (4-6 weeks)      PLAN:  HEP development, stretching as needed per objective findings, strengthening upper quadrant, A/AA/PROM shoulder/cervical/thoracic motions, joint mobilizations prn, posture education, STM/MI as needed to reduce muscle tension per objective findings, muscle reeducation per objective findings, dynamic stabilization, PLOC discussed and agreed upon with patient. Lymphedema management, transfer care to CLT    Patient would benefit from: Skilled PT  Planned therapy interventions: HEP, Manual therapy, Neuromuscular re-education, Patient education, Postural training, Strengthening, Stretching, and Therapeutic exercises, lymphedema management  Frequency:  1-2 x week  Duration in weeks: 12 weeks  Plan of Care beginning date: 01/30/25  Plan of Care expiration date: 12 weeks - 4/24/2025  Treatment plan discussed with:  Patient    Patient verbalized understanding of POC. Please contact me if you have any questions or recommendations. Thank you for the referral and the opportunity to share in Eulalio Raphael's care.        Eval/ Re-eval Auth #/ Referral # Total visits Start date  Expiration date Total active units  Total manual units  PT only or  PT+OT?   1/30/2025                                                         Date:           Total authorized units:  Active:            Manual:           Total remaining units:  Active:               Manual:                      Past Medical History:   Diagnosis Date    Acute encephalopathy 2/3/2024    Breast cancer (HCC)     Diabetes mellitus (HCC)     Hypertension            Precautions: h/o breast cancer left ~ 6 years ago, + thyroid cancer, + reverse TSR Feb 2024, diabetes  Date 1/30/2025 1/30/25       Visit Number IE 2   FOTO                                                                   Neuro Re-ed         Shoulder isos         Scap retract  instruct        Serratus protraction supine         Supine alphabet         Scap retraction iso hold: looped band                  Thera Ex         Pulleys          Snow angels AroM supine         TB shoulder ext         TB row          Supine flexoin AROM         Wall slides                   SL ER         Cane shldr flexion  Supine instruct        Thera Activity         Patient education         Posture education Instruct in sitting posture                                   Reevaluation                  Modalities         Ice           Access Code: [email or web address]  URL: [email or web address]  Date: 01/30/2025  Prepared by: Opal Strong    Exercises  - Seated Scapular Retraction  - 1 x daily - 7 x weekly - 1 sets - 10 reps - 5 sec hold  - Supine Shoulder Flexion Extension AAROM with Dowel  - 1 x daily - 7 x weekly - 2 sets - 10 reps

## 2025-03-10 ENCOUNTER — OFFICE VISIT (OUTPATIENT)
Dept: FAMILY MEDICINE CLINIC | Facility: CLINIC | Age: 80
End: 2025-03-10
Payer: MEDICARE

## 2025-03-10 VITALS
BODY MASS INDEX: 26.68 KG/M2 | SYSTOLIC BLOOD PRESSURE: 158 MMHG | HEART RATE: 44 BPM | DIASTOLIC BLOOD PRESSURE: 70 MMHG | WEIGHT: 145 LBS | RESPIRATION RATE: 20 BRPM | HEIGHT: 62 IN | TEMPERATURE: 97.4 F

## 2025-03-10 DIAGNOSIS — I10 ESSENTIAL HYPERTENSION: ICD-10-CM

## 2025-03-10 DIAGNOSIS — M25.562 ACUTE PAIN OF LEFT KNEE: ICD-10-CM

## 2025-03-10 DIAGNOSIS — E11.9 TYPE 2 DIABETES MELLITUS WITHOUT COMPLICATION, WITHOUT LONG-TERM CURRENT USE OF INSULIN (HCC): ICD-10-CM
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
## 2025-03-10 DIAGNOSIS — R00.1 SINUS BRADYCARDIA: ICD-10-CM

## 2025-03-10 DIAGNOSIS — C73 PAPILLARY THYROID CARCINOMA (HCC): ICD-10-CM

## 2025-03-10 DIAGNOSIS — I49.1 PAC (PREMATURE ATRIAL CONTRACTION): ICD-10-CM

## 2025-03-10 PROCEDURE — 99214 OFFICE O/P EST MOD 30 MIN: CPT | Performed by: NURSE PRACTITIONER

## 2025-03-10 PROCEDURE — G2211 COMPLEX E/M VISIT ADD ON: HCPCS | Performed by: NURSE PRACTITIONER

## 2025-03-10 NOTE — ASSESSMENT & PLAN NOTE
Complaint with current regimen  Lab Results   Component Value Date    HGBA1C 6.2 (H) 09/07/2024

## 2025-03-10 NOTE — ASSESSMENT & PLAN NOTE
SBP elevated and will continue to monitor at home and if consistently above 140 then will follow back in office

## 2025-03-10 NOTE — PROGRESS NOTES
Name: Eulalio Raphael      : 1945      MRN: [id number]  Encounter Provider: OMER Sauceda  Encounter Date: 3/10/2025   Encounter department: [facility]  :  Assessment & Plan  Fall, initial encounter         Essential hypertension  SBP elevated and will continue to monitor at home and if consistently above 140 then will follow back in office       Sinus bradycardia    Orders:  •  Ambulatory Referral to Cardiology; Future    PAC (premature atrial contraction)    Orders:  •  Ambulatory Referral to Cardiology; Future    Papillary thyroid carcinoma (HCC)  Managed by ENT       Type 2 diabetes mellitus without complication, without long-term current use of insulin (HCC)  Complaint with current regimen  Lab Results   Component Value Date    HGBA1C 6.2 (H) 2024          Acute pain of left knee  Advised to use knee brace and ice the area and will follow back for any concerns              History of Present Illness   Patient was admitted in [facility] on 25 as missed the step outside restaurant. Patient had CT chest and MRI done. Here with daughter  MRI cervical spine wo contrast  Impression    Impression:    1.  No acute fracture or dislocation. No abnormal marrow signal in the C2  vertebral body to indicate an acute fracture.    2.  Moderate spinal stenosis and moderate bilateral foraminal narrowing at  C4-C5. Mild-to-moderate bilateral foraminal narrowing at C5-C6. Multilevel  degenerative changes as above.    3.  Incidental note is made of a right mediastinal mass which is partially  imaged on the current study.      Patient under care of ENT and stated that had surgery on thryoid. Last CT cervical spine in 2024 showed Multinodular goiter with multiple calcified right thyroid nodules. Incompletely visualized soft tissue mass in the right upper thorax, possibly extrapleural for which a dedicated CT chest with contrast is recommended. But the had CT chest done in 2024 which was  "negative for any chest mass. Discussed with daughter that she needs to follow with her surgeon back to discuss about this finding on MRI which is showing mediastinal mass and as per daughter, it is old but recommended strongly to confirm with her surgeon as it is not new and has been there and daughter verbalizes understanding and will follow with surgeon ASAP.    Stated that having left knee pain and bruising which is resolving    Also had the abdominal pain and diarrhea which resolved completely and back to normal    SBP elevated but denies any headache, dizziness, and leni    Also has sinus bradycardia with PAC's and will follow with cardiologist for that        Review of Systems   HENT: Negative.     Respiratory: Negative.     Cardiovascular: Negative.    Gastrointestinal: Negative.    Musculoskeletal:  Positive for arthralgias and neck pain.   Neurological:  Negative for dizziness and headaches.       Objective   /70   Pulse (!) 44   Temp (!) 97.4 °F (36.3 °C)   Resp 20   Ht 5' 2\" (1.575 m)   Wt 65.8 kg (145 lb)   BMI 26.52 kg/m²      Physical Exam  HENT:      Head: Normocephalic.   Eyes:      Conjunctiva/sclera: Conjunctivae normal.   Cardiovascular:      Rate and Rhythm: Normal rate and regular rhythm.      Pulses: Normal pulses.      Heart sounds: Normal heart sounds.   Pulmonary:      Effort: Pulmonary effort is normal.      Breath sounds: Normal breath sounds.   Musculoskeletal:      Left knee: Tenderness (mild bruising but no restricted ROM noted) present.   Skin:     General: Skin is warm and dry.   Neurological:      Mental Status: She is alert and oriented to person, place, and time.   Psychiatric:         Mood and Affect: Mood normal.         Behavior: Behavior normal.         Thought Content: Thought content normal.         Judgment: Judgment normal.         "

## 2025-03-17 DIAGNOSIS — E03.9 ACQUIRED HYPOTHYROIDISM: ICD-10-CM

## 2025-03-17 DIAGNOSIS — F41.1 GENERALIZED ANXIETY DISORDER: ICD-10-CM

## 2025-03-17 DIAGNOSIS — I10 ESSENTIAL HYPERTENSION: ICD-10-CM

## 2025-03-17 DIAGNOSIS — E11.9 TYPE 2 DIABETES MELLITUS WITHOUT COMPLICATION, WITHOUT LONG-TERM CURRENT USE OF INSULIN (HCC): ICD-10-CM

## 2025-03-18 RX ORDER — AMLODIPINE BESYLATE 10 MG/1
10 TABLET ORAL DAILY
Qty: 90 TABLET | Refills: 1 | Status: SHIPPED | OUTPATIENT
Start: 2025-03-18

## 2025-03-18 RX ORDER — LEVOTHYROXINE SODIUM 25 UG/1
25 TABLET ORAL DAILY
Qty: 90 TABLET | Refills: 1 | Status: SHIPPED | OUTPATIENT
Start: 2025-03-18

## 2025-03-18 RX ORDER — PAROXETINE 20 MG/1
20 TABLET, FILM COATED ORAL DAILY
Qty: 90 TABLET | Refills: 1 | Status: SHIPPED | OUTPATIENT
Start: 2025-03-18

## 2025-04-09 ENCOUNTER — TELEPHONE (OUTPATIENT)
Dept: FAMILY MEDICINE CLINIC | Facility: CLINIC | Age: 80
End: 2025-04-09

## 2025-04-09 NOTE — TELEPHONE ENCOUNTER
[facility] called Rx refill line asking for Rx for Diclofenac 10 mg to be sent to them, advised that it not on pt's active med list and if she is requesting it I can send a message to the pharmacy - pharmacy stated pt did not ask for his request and they will d/c it off her list

## 2025-04-27 DIAGNOSIS — F41.1 GENERALIZED ANXIETY DISORDER: ICD-10-CM

## 2025-04-28 RX ORDER — PAROXETINE 20 MG/1
20 TABLET, FILM COATED ORAL DAILY
Qty: 90 TABLET | Refills: 1 | Status: SHIPPED | OUTPATIENT
Start: 2025-04-28

## 2025-06-22 DIAGNOSIS — I10 ESSENTIAL HYPERTENSION: ICD-10-CM

## 2025-06-22 DIAGNOSIS — E03.9 ACQUIRED HYPOTHYROIDISM: ICD-10-CM

## 2025-06-22 RX ORDER — LEVOTHYROXINE SODIUM 25 UG/1
25 TABLET ORAL DAILY
Qty: 90 TABLET | Refills: 1 | Status: SHIPPED | OUTPATIENT
Start: 2025-06-22

## 2025-06-22 RX ORDER — AMLODIPINE BESYLATE 10 MG/1
10 TABLET ORAL DAILY
Qty: 90 TABLET | Refills: 1 | Status: SHIPPED | OUTPATIENT
Start: 2025-06-22

## 2025-07-02 DIAGNOSIS — L02.91 ABSCESS: ICD-10-CM

## 2025-07-03 ENCOUNTER — TELEPHONE (OUTPATIENT)
Dept: FAMILY MEDICINE CLINIC | Facility: CLINIC | Age: 80
End: 2025-07-03

## 2025-07-03 RX ORDER — MUPIROCIN 2 %
OINTMENT (GRAM) TOPICAL 2 TIMES DAILY
Qty: 44 G | OUTPATIENT
Start: 2025-07-03

## 2025-07-03 NOTE — TELEPHONE ENCOUNTER
Please call patient  Let her know I didn't refill the bactroban antibiotic ointment  Does she have another infection?  Lovely Kim, DO

## 2025-07-03 NOTE — TELEPHONE ENCOUNTER
Called patient to relay provider's message - no answer and voicemail is full. If she returns call, please tell her Dr Kim did not do this refill and is asking if she has another infection? Or was this an error

## (undated) DEVICE — LIGACLIP MCA MULTIPLE CLIP APPLIERS, 20 MEDIUM CLIPS: Brand: LIGACLIP

## (undated) DEVICE — HEWSON SUTURE RETRIEVER: Brand: HEWSON SUTURE RETRIEVER

## (undated) DEVICE — PACK UNIVERSAL NECK

## (undated) DEVICE — ARTHROSCOPY FLOOR MAT

## (undated) DEVICE — BIPOLAR CORD DISP

## (undated) DEVICE — GLOVE SRG BIOGEL 7.5

## (undated) DEVICE — DRAPE SHEET X-LG

## (undated) DEVICE — T-MAX DISPOSABLE FACE MASK 8 PER BOX

## (undated) DEVICE — SUT ETHILON 3-0 FS-1 18 IN 663G

## (undated) DEVICE — INTENDED FOR TISSUE SEPARATION, AND OTHER PROCEDURES THAT REQUIRE A SHARP SURGICAL BLADE TO PUNCTURE OR CUT.: Brand: BARD-PARKER SAFETY BLADES SIZE 10, STERILE

## (undated) DEVICE — GLOVE INDICATOR PI UNDERGLOVE SZ 7.5 BLUE

## (undated) DEVICE — DRAPE SURGIKIT SADDLE BAG

## (undated) DEVICE — SUT NICELOOP GREEN SZ 5 BRD PRFE IMPREG POLY

## (undated) DEVICE — GLOVE SRG BIOGEL ORTHOPEDIC 7.5

## (undated) DEVICE — DRESSING MEPILEX AG BORDER POST-OP 4 X 10 IN

## (undated) DEVICE — PROXIMATE SKIN STAPLERS (35 WIDE) CONTAINS 35 STAINLESS STEEL STAPLES (FIXED HEAD): Brand: PROXIMATE

## (undated) DEVICE — DRAIN JACKSON PRATT 10FR 1/8END: Brand: CARDINAL HEALTH

## (undated) DEVICE — GLOVE SRG BIOGEL ECLIPSE 6

## (undated) DEVICE — GLOVE INDICATOR PI UNDERGLOVE SZ 8 BLUE

## (undated) DEVICE — HOOK ELASTIC STAY 12MM BLUNT SNGL STRL

## (undated) DEVICE — JACKSON-PRATT 100CC BULB RESERVOIR: Brand: CARDINAL HEALTH

## (undated) DEVICE — PROBE 8225101 5PK STD PRASS FL TIP ROHS

## (undated) DEVICE — IMPERVIOUS STOCKINETTE: Brand: DEROYAL

## (undated) DEVICE — ALCOHOL ISOPROPYL BLUE

## (undated) DEVICE — CEMENT MIXING TOWER

## (undated) DEVICE — PREMIUM DRY TRAY LF: Brand: MEDLINE INDUSTRIES, INC.

## (undated) DEVICE — ROSEBUD DISSECTORS: Brand: DEROYAL

## (undated) DEVICE — SUT NICELOOP WHITE SZ 5 BRD PRFE IMPREG POLY

## (undated) DEVICE — CHLORAPREP HI-LITE 26ML ORANGE

## (undated) DEVICE — GUIDE PIN 2.5 X 220MM AEQUALIS PERFORM PLUS

## (undated) DEVICE — HARMONIC FOCUS SHEARS 9CM LENGTH + ADAPTIVE TISSUE TECHNOLOGY FOR USE WITH BLUE HAND PIECE ONLY: Brand: HARMONIC FOCUS

## (undated) DEVICE — 3M™ STERI-STRIP™ REINFORCED ADHESIVE SKIN CLOSURES, R1547, 1/2 IN X 4 IN (12 MM X 100 MM), 6 STRIPS/ENVELOPE: Brand: 3M™ STERI-STRIP™

## (undated) DEVICE — GAUZE SPONGES,16 PLY: Brand: CURITY

## (undated) DEVICE — ANTIBACTERIAL UNDYED BRAIDED (POLYGLACTIN 910), SYNTHETIC ABSORBABLE SUTURE: Brand: COATED VICRYL

## (undated) DEVICE — SURGICEL 2 X 3

## (undated) DEVICE — Device

## (undated) DEVICE — ELECTRODE BLADE MOD E-Z CLEAN  2.75IN 7CM -0012AM

## (undated) DEVICE — HOOD: Brand: T7PLUS

## (undated) DEVICE — GLOVE SRG BIOGEL 8

## (undated) DEVICE — SUT ETHIBOND 5 V-37 30 IN MB66G

## (undated) DEVICE — SUT SILK 0 30 IN A306H

## (undated) DEVICE — DRILL BIT 3.2MM

## (undated) DEVICE — INTENDED FOR TISSUE SEPARATION, AND OTHER PROCEDURES THAT REQUIRE A SHARP SURGICAL BLADE TO PUNCTURE OR CUT.: Brand: BARD-PARKER SAFETY BLADES SIZE 15, STERILE

## (undated) DEVICE — SUT SILK 3-0 18 IN A184H

## (undated) DEVICE — HOOD WITH PEEL AWAY FACE SHIELD: Brand: T7PLUS

## (undated) DEVICE — PENCIL ELECTROSURG E-Z CLEAN -0035H

## (undated) DEVICE — BETHLEHEM UNIV MAJOR ORTHO,KIT: Brand: CARDINAL HEALTH

## (undated) DEVICE — SUT VICRYL 2-0 CT-1 27 IN J259H

## (undated) DEVICE — PREP PAD BNS: Brand: CONVERTORS

## (undated) DEVICE — BULB SYRINGE,IRRIGATION WITH PROTECTIVE CAP: Brand: DOVER

## (undated) DEVICE — DRESSING MEPILEX AG BORDER POST-OP 4 X 8 IN

## (undated) DEVICE — DISPOSABLE EQUIPMENT COVER: Brand: SMALL TOWEL DRAPE

## (undated) DEVICE — SUT SILK 2-0 SH 30 IN K833H

## (undated) DEVICE — GLOVE INDICATOR PI UNDERGLOVE SZ 6.5 BLUE

## (undated) DEVICE — SUT VICRYL 0 CT-1 27 IN J260H

## (undated) DEVICE — TIBURON SPLIT SHEET: Brand: CONVERTORS

## (undated) DEVICE — SUT MONOCRYL PLUS 4-0 PS-2 18 IN MCP496G

## (undated) DEVICE — 2108 SERIES SAGITTAL BLADE (18.6 X 0.64 X 61.1MM)

## (undated) DEVICE — SPONGE SCRUB 4 PCT CHLORHEXIDINE

## (undated) DEVICE — SUT SILK 2-0 18 IN A185H

## (undated) DEVICE — HYDROGEN PEROXIDE 3 PCT 4OZ

## (undated) DEVICE — HANDPIECE SET WITH RETRACTABLE COAXIAL FAN SPRAY TIP AND SUCTION TUBE: Brand: INTERPULSE

## (undated) DEVICE — JP PERF DRN SIL FLT 10MM FULL: Brand: CARDINAL HEALTH